# Patient Record
Sex: FEMALE | Race: OTHER | NOT HISPANIC OR LATINO | URBAN - METROPOLITAN AREA
[De-identification: names, ages, dates, MRNs, and addresses within clinical notes are randomized per-mention and may not be internally consistent; named-entity substitution may affect disease eponyms.]

---

## 2021-03-13 ENCOUNTER — INPATIENT (INPATIENT)
Facility: HOSPITAL | Age: 81
LOS: 4 days | Discharge: HOME HEALTH | End: 2021-03-18
Attending: HOSPITALIST | Admitting: HOSPITALIST
Payer: MEDICARE

## 2021-03-13 VITALS
DIASTOLIC BLOOD PRESSURE: 88 MMHG | TEMPERATURE: 97 F | OXYGEN SATURATION: 94 % | RESPIRATION RATE: 16 BRPM | SYSTOLIC BLOOD PRESSURE: 138 MMHG | HEART RATE: 101 BPM

## 2021-03-13 DIAGNOSIS — R55 SYNCOPE AND COLLAPSE: ICD-10-CM

## 2021-03-13 LAB
ALBUMIN SERPL ELPH-MCNC: 3.8 G/DL — SIGNIFICANT CHANGE UP (ref 3.3–5)
ALP SERPL-CCNC: 88 U/L — SIGNIFICANT CHANGE UP (ref 40–120)
ALT FLD-CCNC: 12 U/L — SIGNIFICANT CHANGE UP (ref 4–33)
ANION GAP SERPL CALC-SCNC: 13 MMOL/L — SIGNIFICANT CHANGE UP (ref 7–14)
APTT BLD: 28.4 SEC — SIGNIFICANT CHANGE UP (ref 27–36.3)
AST SERPL-CCNC: 35 U/L — HIGH (ref 4–32)
BASOPHILS # BLD AUTO: 0.02 K/UL — SIGNIFICANT CHANGE UP (ref 0–0.2)
BASOPHILS NFR BLD AUTO: 0.4 % — SIGNIFICANT CHANGE UP (ref 0–2)
BILIRUB SERPL-MCNC: 0.4 MG/DL — SIGNIFICANT CHANGE UP (ref 0.2–1.2)
BUN SERPL-MCNC: 18 MG/DL — SIGNIFICANT CHANGE UP (ref 7–23)
CALCIUM SERPL-MCNC: 10.6 MG/DL — HIGH (ref 8.4–10.5)
CHLORIDE SERPL-SCNC: 103 MMOL/L — SIGNIFICANT CHANGE UP (ref 98–107)
CO2 SERPL-SCNC: 23 MMOL/L — SIGNIFICANT CHANGE UP (ref 22–31)
CORTIS AM PEAK SERPL-MCNC: 9.4 UG/DL — SIGNIFICANT CHANGE UP (ref 6–18.4)
CREAT SERPL-MCNC: 1.27 MG/DL — SIGNIFICANT CHANGE UP (ref 0.5–1.3)
EOSINOPHIL # BLD AUTO: 0.06 K/UL — SIGNIFICANT CHANGE UP (ref 0–0.5)
EOSINOPHIL NFR BLD AUTO: 1.1 % — SIGNIFICANT CHANGE UP (ref 0–6)
GLUCOSE SERPL-MCNC: 104 MG/DL — HIGH (ref 70–99)
HCT VFR BLD CALC: 38.5 % — SIGNIFICANT CHANGE UP (ref 34.5–45)
HGB BLD-MCNC: 12 G/DL — SIGNIFICANT CHANGE UP (ref 11.5–15.5)
IANC: 3.84 K/UL — SIGNIFICANT CHANGE UP (ref 1.5–8.5)
IMM GRANULOCYTES NFR BLD AUTO: 0.4 % — SIGNIFICANT CHANGE UP (ref 0–1.5)
INR BLD: 1.07 RATIO — SIGNIFICANT CHANGE UP (ref 0.88–1.16)
LYMPHOCYTES # BLD AUTO: 1.18 K/UL — SIGNIFICANT CHANGE UP (ref 1–3.3)
LYMPHOCYTES # BLD AUTO: 21.5 % — SIGNIFICANT CHANGE UP (ref 13–44)
MAGNESIUM SERPL-MCNC: 1.8 MG/DL — SIGNIFICANT CHANGE UP (ref 1.6–2.6)
MCHC RBC-ENTMCNC: 28.8 PG — SIGNIFICANT CHANGE UP (ref 27–34)
MCHC RBC-ENTMCNC: 31.2 GM/DL — LOW (ref 32–36)
MCV RBC AUTO: 92.3 FL — SIGNIFICANT CHANGE UP (ref 80–100)
MONOCYTES # BLD AUTO: 0.37 K/UL — SIGNIFICANT CHANGE UP (ref 0–0.9)
MONOCYTES NFR BLD AUTO: 6.7 % — SIGNIFICANT CHANGE UP (ref 2–14)
NEUTROPHILS # BLD AUTO: 3.84 K/UL — SIGNIFICANT CHANGE UP (ref 1.8–7.4)
NEUTROPHILS NFR BLD AUTO: 69.9 % — SIGNIFICANT CHANGE UP (ref 43–77)
NRBC # BLD: 0 /100 WBCS — SIGNIFICANT CHANGE UP
NRBC # FLD: 0 K/UL — SIGNIFICANT CHANGE UP
PHOSPHATE SERPL-MCNC: 2.8 MG/DL — SIGNIFICANT CHANGE UP (ref 2.5–4.5)
PLATELET # BLD AUTO: 455 K/UL — HIGH (ref 150–400)
POTASSIUM SERPL-MCNC: 4.6 MMOL/L — SIGNIFICANT CHANGE UP (ref 3.5–5.3)
POTASSIUM SERPL-SCNC: 4.6 MMOL/L — SIGNIFICANT CHANGE UP (ref 3.5–5.3)
PROT SERPL-MCNC: 8 G/DL — SIGNIFICANT CHANGE UP (ref 6–8.3)
PROTHROM AB SERPL-ACNC: 12.3 SEC — SIGNIFICANT CHANGE UP (ref 10.6–13.6)
RBC # BLD: 4.17 M/UL — SIGNIFICANT CHANGE UP (ref 3.8–5.2)
RBC # FLD: 14 % — SIGNIFICANT CHANGE UP (ref 10.3–14.5)
SARS-COV-2 RNA SPEC QL NAA+PROBE: SIGNIFICANT CHANGE UP
SODIUM SERPL-SCNC: 139 MMOL/L — SIGNIFICANT CHANGE UP (ref 135–145)
T4 AB SER-ACNC: 8.97 UG/DL — SIGNIFICANT CHANGE UP (ref 5.1–13)
TROPONIN T, HIGH SENSITIVITY RESULT: 33 NG/L — SIGNIFICANT CHANGE UP
TROPONIN T, HIGH SENSITIVITY RESULT: 36 NG/L — SIGNIFICANT CHANGE UP
TSH SERPL-MCNC: <0.1 UIU/ML — LOW (ref 0.27–4.2)
WBC # BLD: 5.49 K/UL — SIGNIFICANT CHANGE UP (ref 3.8–10.5)
WBC # FLD AUTO: 5.49 K/UL — SIGNIFICANT CHANGE UP (ref 3.8–10.5)

## 2021-03-13 PROCEDURE — 99285 EMERGENCY DEPT VISIT HI MDM: CPT | Mod: CS,25

## 2021-03-13 PROCEDURE — 93010 ELECTROCARDIOGRAM REPORT: CPT

## 2021-03-13 PROCEDURE — 99223 1ST HOSP IP/OBS HIGH 75: CPT | Mod: GC

## 2021-03-13 PROCEDURE — 70450 CT HEAD/BRAIN W/O DYE: CPT | Mod: 26

## 2021-03-13 PROCEDURE — 71045 X-RAY EXAM CHEST 1 VIEW: CPT | Mod: 26

## 2021-03-13 RX ORDER — SODIUM CHLORIDE 9 MG/ML
500 INJECTION INTRAMUSCULAR; INTRAVENOUS; SUBCUTANEOUS ONCE
Refills: 0 | Status: COMPLETED | OUTPATIENT
Start: 2021-03-13 | End: 2021-03-13

## 2021-03-13 RX ORDER — AMLODIPINE BESYLATE 2.5 MG/1
2.5 TABLET ORAL ONCE
Refills: 0 | Status: COMPLETED | OUTPATIENT
Start: 2021-03-13 | End: 2021-03-13

## 2021-03-13 RX ADMIN — AMLODIPINE BESYLATE 2.5 MILLIGRAM(S): 2.5 TABLET ORAL at 20:02

## 2021-03-13 RX ADMIN — SODIUM CHLORIDE 666.67 MILLILITER(S): 9 INJECTION INTRAMUSCULAR; INTRAVENOUS; SUBCUTANEOUS at 18:31

## 2021-03-13 NOTE — H&P ADULT - ASSESSMENT
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

## 2021-03-13 NOTE — ED ADULT TRIAGE NOTE - CHIEF COMPLAINT QUOTE
pt brought from home after syncopal episode while sitting in walker, witnessed by son. RR even and unlabored. Pt mute and deaf, endorsing rt sided CP and back pain. No significant PMH.

## 2021-03-13 NOTE — ED ADULT NURSE NOTE - BP NONINVASIVE SYSTOLIC (MM HG)
[facility] Follow-up    Call initiated by patient.  Message recorded by Agustina Gramajo  Calling for: Monthly follow up and communication with language line  Patient: Hayde LUJAN Say  Phone conversation with: Patient  Patient's Phone number/s: Home number on file [phone] (home)  Available today in the PM on their Home number.  OK to leave message on voice mail? Yes      Major diagnoses and/or issues requiring coordination services  BRENDA (generalized anxiety disorder)   PTSD (post-traumatic stress disorder)   Seasonal allergic rhinitis   Recurrent major depressive disorder (H)     BRENDA (generalized anxiety disorder)   PTSD (post-traumatic stress disorder)   Seasonal allergic rhinitis   Recurrent major depressive disorder (H)           In the past month, how many times have you been to the Emergency Room? 0  In the past month, how many times have you been hospitalized? 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     Self-management goals:  Work with therapist to address dreams, anxieties and fears  Communicate to Agustina on needs for her and children around appointments   Work with shawn to establish more in home help and possibly more mental didier help  Readiness to change: Ready  Meeting with Shawn in her home at 2pm Monday 5.7  School team out today Friday 5.4  Will see me this week for med set    Counseling and coordination activities with: patient/family, PCP and specialist:     Follow-up date: monthly and prn   204

## 2021-03-13 NOTE — ED PROVIDER NOTE - OBJECTIVE STATEMENT
Dhruv: Story from son: 1 month ago, pt passed out in the kitchen; didn't hit head; didn't seek care. Today, felt dizzy, began to faint while sitting in her walker, slow breathing; occurred 3:15 PM. Fainted for a few seconds. Didn't fall. No CP/SOB/HA. Has chronic back pain; not worse than usual. Hasn't been sick recently. 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 Not know PCP. Has been eating/drinking well recently. Dhruv: Story from son: 1 month ago, pt passed out in the kitchen; didn't hit head; didn't seek care. Today, felt dizzy, began to faint while sitting in her walker, slow breathing; occurred 3:15 PM. Fainted for a few seconds. Didn't fall. No CP/SOB/HA. Has chronic back pain; not worse than usual. Hasn't been sick recently. 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 Not know PCP. Has been eating/drinking well recently.    Khris Mendez

## 2021-03-13 NOTE — ED PROVIDER NOTE - PHYSICAL EXAMINATION
Well appearing, well nourished, awake, alert, deaf and mute, in no apparent distress.    Airway patent    Eyes without scleral injection. No jaundice.    Strong pulse.    Respirations unlabored.    Abdomen soft, non-tender, no guarding.    Spine appears normal, range of motion is not limited, no muscle or joint tenderness.    Alert and oriented, no gross motor or sensory deficits.    Skin normal color for race, warm, dry and intact. No evidence of rash.    No SI/HI.

## 2021-03-13 NOTE — H&P ADULT - PROBLEM SELECTOR PLAN 3
- pt with history of HTN, unknown home medications  - s/p amlodipine in ED, noted to be HTNsive to SBP 190s; given 5 mg hydralazine - pt with history of HTN, unknown home medications  - s/p amlodipine in ED, noted to be HTNsive to SBP 190s; given 5 mg hydralazine, c/w amlodipine

## 2021-03-13 NOTE — ED ADULT NURSE NOTE - OBJECTIVE STATEMENT
Pt presents to room 2, alert&orientedx4, ambulatory with walker, PMHX HTN, non-verbal and deaf at baseline since birth) coming to ED for witnessed syncopal episode by son that lasted ~few seconds. Pt was sitting on her walker when son saw that she experienced LOC, followed by slow breathing. Denies any falls, injuries or head trauma. As per son, pt experienced similar episode [id number], had syncopal episode in the kitchen and did not seek medical attention. Upon arrival, pt appears NAD, breathing even and non-labored, NSR on cardiac monitor, skin dry and intact, mole to outside of right eye, and is non-diaphoretic. Pt denies any chest pain/SOB/fever or n/v/d. Pt arrived with de-stress belt. Awaiting further plan of care

## 2021-03-13 NOTE — ED PROVIDER NOTE - PROGRESS NOTE DETAILS
Sue Torres MD, PGY3: Patient received at resident sign out. pituitary tumor, NSGY called, endocrine work up and syncope work up first under medicine, Dr. Oconnor acceted the pt.

## 2021-03-13 NOTE — H&P ADULT - NSHPSOCIALHISTORY_GEN_ALL_CORE
Unknown. Per son, patient lives with son, his girlfriend, and his stepdaughter. Uses cane and walker to ambulate. No history of smoking or drinking alcohol

## 2021-03-13 NOTE — H&P ADULT - HISTORY OF PRESENT ILLNESS
Dhruv: Story from son: 1 month ago, pt passed out in the kitchen; didn't hit head; didn't seek care. Today, felt dizzy, began to faint while sitting in her walker, slow breathing; occurred 3:15 PM. Fainted for a few seconds. Didn't fall. No CP/SOB/HA. Has chronic back pain; not worse than usual. Hasn't been sick recently. 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 Not know PCP. Has been eating/drinking well recently.    	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 Brief history obtained from son in ED, however unable to reach family for comprehensive history after multiple attempts.  Dhruv: Story from son: 1 month ago, pt passed out in the kitchen; didn't hit head; didn't seek care. Today, felt dizzy, began to faint while sitting in her walker, slow breathing; occurred 3:15 PM. Fainted for a few seconds. Didn't fall. No CP/SOB/HA. Has chronic back pain; not worse than usual. Hasn't been sick recently. 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 Not know PCP. Has been eating/drinking well recently.    In ED, T 96.6  /88 (max 190/79) RR 16 SpO2 94% RA. Given 2.5 mg amlodipine, 500 cc bolus. 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 No surgical intervention at this point per neurosurgery. Pituitary Labs (ACTH, TSH, LH/FSH, Prolactin, GH) ordered.   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 Spoke with son Domingo. Per son, patient was sitting on walker around 3:15 in the kitchen. She was making noise to express that she was dizzy. Her right arm became limp followed by her left arm. Per son, patient lost consciousness in her chair for 1 minute, after which she regained consciousness on her own. One month ago, patient was found on the ground in the kitchen after an apparent fall (unclear whether patient fainted first and then fell, or lost consciousness after slipping). At that time, patient also spontaneously regain consciousness for few seconds. Patient did not recall falling. Patient lives with son and his stepdaughter. Patient takes no medications. Son does not know name of PCP. Per son, he did not seek medical attention for first syncopal episode because patient refused to go to the doctor. She  has had ankle and abdominal surgery in the past requiring metal to be placed in body, but son in unsure of specifics. She has not seen a doctor in 2 years.     In ED, T 96.6  /88 (max 190/79) RR 16 SpO2 94% RA. Given 2.5 mg amlodipine, 500 cc bolus. 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 No surgical intervention at this point per neurosurgery. Pituitary Labs (ACTH, TSH, LH/FSH, Prolactin, GH) ordered.   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 Spoke with son Domingo. Per son, patient was sitting on walker around 3:15 in the kitchen. She was making noise to express that she was dizzy. Her right arm became limp followed by her left arm. Per son, patient lost consciousness in her chair for 1 minute, after which she regained consciousness on her own. One month ago, patient was found on the ground in the kitchen after an apparent fall (unclear whether patient fainted first and then fell, or lost consciousness after slipping). At that time, patient also spontaneously regain consciousness for few seconds. Patient did not recall falling. Patient lives with son and his stepdaughter. Patient takes no medications. Son does not know name of PCP. Per son, he did not seek medical attention for first syncopal episode because patient refused to go to the doctor. She  has had ankle and abdominal surgery in the past requiring metal to be placed in body, but son in unsure of specifics. She has not seen a doctor in 2 years.     In ED, T 96.6  /88 (max 190/79) RR 16 SpO2 94% RA. Given 2.5 mg amlodipine, 500 cc bolus. 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 No surgical intervention at this point per neurosurgery. Pituitary Labs (ACTH, TSH, LH/FSH, Prolactin, GH) ordered.

## 2021-03-13 NOTE — H&P ADULT - PROBLEM SELECTOR PLAN 2
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 Per neurosurgery, no surgical intervention at this time.  - MR brain ordered  - f/u endocrine labs: ACTH, TSH, LH/FSH, Prolactin, GH

## 2021-03-13 NOTE — H&P ADULT - PROBLEM SELECTOR PLAN 1
-pt presenting with history of 2 syncopal episodes in setting of brain mass. DDx include neurologic syncope vs. cardiogenic syncope vs syncope from orthostasis. However, patient does not have a known hx of arrhythmia. Uknown whether patietn had presyncopal symptoms. Unlikely to be syncope from orthostastis given hypertension  - admit to telemetry  - TTE  - MR brain ordered -pt presenting with history of 2 syncopal episodes in setting of brain mass. DDx include neurologic syncope vs. cardiogenic syncope vs syncope from orthostasis. However, patient does not have a known hx of arrhythmia. Uknown whether patietn had presyncopal symptoms. Unlikely to be syncope from orthostastis given hypertension  - admit to telemetry, EKG  - TTE  - MR brain ordered  - neuro consult in AM, ?seizure

## 2021-03-13 NOTE — ED PROVIDER NOTE - ATTENDING CONTRIBUTION TO CARE
I performed a face-to-face evaluation of the patient and performed a history and physical examination. I agree with the history and physical examination.    Syncope today. No CP/SOB/HA. Appears well. NAD. Afebrile. Marked HTN. Check EKG, trop, BG, CT brain, CXR (if wide mediastinum, check CT aorta). Admit.

## 2021-03-13 NOTE — ED PROVIDER NOTE - CLINICAL SUMMARY MEDICAL DECISION MAKING FREE TEXT BOX
Dhruv: Syncope today. No CP/SOB/HA. Appears well. NAD. Afebrile. Marked HTN. Check EKG, trop, BG, CT brain, CXR (if wide mediastinum, check CT aorta). Admit.

## 2021-03-13 NOTE — H&P ADULT - NSHPPHYSICALEXAM_GEN_ALL_CORE
GENERAL: NAD, well-developed  HEAD:  Atraumatic, Normocephalic  EYES: EOMI, PERRLA, conjunctiva and sclera clear  NECK: Supple, No JVD  CHEST/LUNG: Clear to auscultation bilaterally; No wheezes or rales  HEART: Regular rate and rhythm; No murmurs, rubs, or gallops  ABDOMEN: Soft, Nontender, Nondistended; Bowel sounds present  EXTREMITIES:  2+ Peripheral Pulses, No clubbing, cyanosis, or edema  PSYCH: AAOx3  NEUROLOGY: non-focal  SKIN: No rashes or lesions GENERAL: NAD, well-developed  HEAD:  Atraumatic, Normocephalic  EYES: EOMI, PERRLA, conjunctiva and sclera clear. Ptosis of right eye  NECK: Supple, No JVD  CHEST/LUNG: Clear to auscultation bilaterally; No wheezes or rales  HEART: Regular rate and rhythm; No murmurs, rubs, or gallops  ABDOMEN: Soft, Nontender, Nondistended; Bowel sounds present  EXTREMITIES:  2+ Peripheral Pulses, No clubbing, cyanosis, or edema  PSYCH: AAOx0  NEUROLOGY: unable to fully assess due to mental status.   SKIN: No rashes or lesions. T(C): 36.4 (03-14-21 @ 04:54), Max: 37 (03-13-21 @ 17:55)  HR: 76 (03-14-21 @ 04:54) (65 - 101)  BP: 150/84 (03-14-21 @ 04:54) (135/101 - 204/62)  RR: 17 (03-14-21 @ 04:54) (13 - 18)  SpO2: 98% (03-14-21 @ 04:54) (94% - 99%)    Constitutional: NAD, well-developed, well-nourished  Ears, Nose, Mouth, and Throat: normal external ears and nose, +deaf, moist oral mucosa  Eyes: normal conjunctiva, EOMI, PERRL, Ptosis of right eye  Neck: supple, no JVD  Respiratory: Clear to auscultation bilaterally. No wheezes, rales or rhonchi. Normal respiratory effort  Cardiovascular: RRR, no M/R/G, no edema, 2+ Peripheral Pulses  Gastrointestinal: soft, nontender, nondistended, +BS, no hernia  Skin: warm, dry, no rash  Neurologic: difficult to assess due to communication barrier, sensation grossly intact, moving all extremities with no difficulty  Musculoskeletal: no clubbing, no cyanosis, no joint swelling  Psychiatric: alert, unable to obtain orientation, no agitation, anxiety

## 2021-03-14 DIAGNOSIS — Z29.9 ENCOUNTER FOR PROPHYLACTIC MEASURES, UNSPECIFIED: ICD-10-CM

## 2021-03-14 DIAGNOSIS — R55 SYNCOPE AND COLLAPSE: ICD-10-CM

## 2021-03-14 DIAGNOSIS — Z02.9 ENCOUNTER FOR ADMINISTRATIVE EXAMINATIONS, UNSPECIFIED: ICD-10-CM

## 2021-03-14 DIAGNOSIS — T68.XXXA HYPOTHERMIA, INITIAL ENCOUNTER: ICD-10-CM

## 2021-03-14 DIAGNOSIS — G93.89 OTHER SPECIFIED DISORDERS OF BRAIN: ICD-10-CM

## 2021-03-14 DIAGNOSIS — I10 ESSENTIAL (PRIMARY) HYPERTENSION: ICD-10-CM

## 2021-03-14 LAB
ALBUMIN SERPL ELPH-MCNC: 3.5 G/DL — SIGNIFICANT CHANGE UP (ref 3.3–5)
ALP SERPL-CCNC: 83 U/L — SIGNIFICANT CHANGE UP (ref 40–120)
ALT FLD-CCNC: 7 U/L — SIGNIFICANT CHANGE UP (ref 4–33)
ANION GAP SERPL CALC-SCNC: 13 MMOL/L — SIGNIFICANT CHANGE UP (ref 7–14)
AST SERPL-CCNC: 24 U/L — SIGNIFICANT CHANGE UP (ref 4–32)
BILIRUB SERPL-MCNC: 0.4 MG/DL — SIGNIFICANT CHANGE UP (ref 0.2–1.2)
BUN SERPL-MCNC: 19 MG/DL — SIGNIFICANT CHANGE UP (ref 7–23)
CALCIUM SERPL-MCNC: 10.4 MG/DL — SIGNIFICANT CHANGE UP (ref 8.4–10.5)
CHLORIDE SERPL-SCNC: 99 MMOL/L — SIGNIFICANT CHANGE UP (ref 98–107)
CO2 SERPL-SCNC: 25 MMOL/L — SIGNIFICANT CHANGE UP (ref 22–31)
CREAT SERPL-MCNC: 1.28 MG/DL — SIGNIFICANT CHANGE UP (ref 0.5–1.3)
FSH SERPL-MCNC: 1.5 IU/L — SIGNIFICANT CHANGE UP
GLUCOSE SERPL-MCNC: 149 MG/DL — HIGH (ref 70–99)
HCT VFR BLD CALC: 36.4 % — SIGNIFICANT CHANGE UP (ref 34.5–45)
HGB BLD-MCNC: 11.3 G/DL — LOW (ref 11.5–15.5)
LH SERPL-ACNC: <0.3 IU/L — SIGNIFICANT CHANGE UP
MAGNESIUM SERPL-MCNC: 1.8 MG/DL — SIGNIFICANT CHANGE UP (ref 1.6–2.6)
MCHC RBC-ENTMCNC: 29 PG — SIGNIFICANT CHANGE UP (ref 27–34)
MCHC RBC-ENTMCNC: 31 GM/DL — LOW (ref 32–36)
MCV RBC AUTO: 93.6 FL — SIGNIFICANT CHANGE UP (ref 80–100)
NRBC # BLD: 0 /100 WBCS — SIGNIFICANT CHANGE UP
NRBC # FLD: 0 K/UL — SIGNIFICANT CHANGE UP
PHOSPHATE SERPL-MCNC: 2.3 MG/DL — LOW (ref 2.5–4.5)
PLATELET # BLD AUTO: 448 K/UL — HIGH (ref 150–400)
POTASSIUM SERPL-MCNC: 3.6 MMOL/L — SIGNIFICANT CHANGE UP (ref 3.5–5.3)
POTASSIUM SERPL-SCNC: 3.6 MMOL/L — SIGNIFICANT CHANGE UP (ref 3.5–5.3)
PROLACTIN SERPL-MCNC: 113 NG/ML — HIGH (ref 3.4–24.1)
PROT SERPL-MCNC: 7.3 G/DL — SIGNIFICANT CHANGE UP (ref 6–8.3)
RBC # BLD: 3.89 M/UL — SIGNIFICANT CHANGE UP (ref 3.8–5.2)
RBC # FLD: 14.1 % — SIGNIFICANT CHANGE UP (ref 10.3–14.5)
SARS-COV-2 IGG SERPL QL IA: NEGATIVE — SIGNIFICANT CHANGE UP
SARS-COV-2 IGM SERPL IA-ACNC: 0.07 INDEX — SIGNIFICANT CHANGE UP
SODIUM SERPL-SCNC: 137 MMOL/L — SIGNIFICANT CHANGE UP (ref 135–145)
T3 SERPL-MCNC: 108 NG/DL — SIGNIFICANT CHANGE UP (ref 80–200)
T3 SERPL-MCNC: 99 NG/DL — SIGNIFICANT CHANGE UP (ref 80–200)
T4 FREE SERPL-MCNC: 1.2 NG/DL — SIGNIFICANT CHANGE UP (ref 0.9–1.8)
T4 FREE SERPL-MCNC: 1.3 NG/DL — SIGNIFICANT CHANGE UP (ref 0.9–1.8)
WBC # BLD: 4.17 K/UL — SIGNIFICANT CHANGE UP (ref 3.8–10.5)
WBC # FLD AUTO: 4.17 K/UL — SIGNIFICANT CHANGE UP (ref 3.8–10.5)

## 2021-03-14 PROCEDURE — 99233 SBSQ HOSP IP/OBS HIGH 50: CPT | Mod: GC

## 2021-03-14 PROCEDURE — 99222 1ST HOSP IP/OBS MODERATE 55: CPT | Mod: GC

## 2021-03-14 RX ORDER — LIDOCAINE 4 G/100G
1 CREAM TOPICAL DAILY
Refills: 0 | Status: DISCONTINUED | OUTPATIENT
Start: 2021-03-14 | End: 2021-03-18

## 2021-03-14 RX ORDER — HYDRALAZINE HCL 50 MG
5 TABLET ORAL ONCE
Refills: 0 | Status: COMPLETED | OUTPATIENT
Start: 2021-03-14 | End: 2021-03-14

## 2021-03-14 RX ORDER — ACETAMINOPHEN 500 MG
650 TABLET ORAL EVERY 6 HOURS
Refills: 0 | Status: DISCONTINUED | OUTPATIENT
Start: 2021-03-14 | End: 2021-03-18

## 2021-03-14 RX ORDER — AMLODIPINE BESYLATE 2.5 MG/1
5 TABLET ORAL DAILY
Refills: 0 | Status: DISCONTINUED | OUTPATIENT
Start: 2021-03-14 | End: 2021-03-15

## 2021-03-14 RX ORDER — ENOXAPARIN SODIUM 100 MG/ML
40 INJECTION SUBCUTANEOUS DAILY
Refills: 0 | Status: DISCONTINUED | OUTPATIENT
Start: 2021-03-14 | End: 2021-03-18

## 2021-03-14 RX ORDER — LABETALOL HCL 100 MG
10 TABLET ORAL ONCE
Refills: 0 | Status: COMPLETED | OUTPATIENT
Start: 2021-03-14 | End: 2021-03-14

## 2021-03-14 RX ORDER — ACETAMINOPHEN 500 MG
1000 TABLET ORAL ONCE
Refills: 0 | Status: COMPLETED | OUTPATIENT
Start: 2021-03-14 | End: 2021-03-14

## 2021-03-14 RX ORDER — POLYETHYLENE GLYCOL 3350 17 G/17G
17 POWDER, FOR SOLUTION ORAL
Refills: 0 | Status: DISCONTINUED | OUTPATIENT
Start: 2021-03-14 | End: 2021-03-18

## 2021-03-14 RX ADMIN — Medication 5 MILLIGRAM(S): at 01:08

## 2021-03-14 RX ADMIN — POLYETHYLENE GLYCOL 3350 17 GRAM(S): 17 POWDER, FOR SOLUTION ORAL at 17:09

## 2021-03-14 RX ADMIN — ENOXAPARIN SODIUM 40 MILLIGRAM(S): 100 INJECTION SUBCUTANEOUS at 13:02

## 2021-03-14 RX ADMIN — Medication 650 MILLIGRAM(S): at 10:23

## 2021-03-14 RX ADMIN — Medication 10 MILLIGRAM(S): at 22:21

## 2021-03-14 RX ADMIN — Medication 1000 MILLIGRAM(S): at 04:07

## 2021-03-14 RX ADMIN — AMLODIPINE BESYLATE 5 MILLIGRAM(S): 2.5 TABLET ORAL at 13:02

## 2021-03-14 RX ADMIN — LIDOCAINE 1 PATCH: 4 CREAM TOPICAL at 19:00

## 2021-03-14 RX ADMIN — Medication 650 MILLIGRAM(S): at 11:00

## 2021-03-14 RX ADMIN — Medication 400 MILLIGRAM(S): at 03:07

## 2021-03-14 RX ADMIN — LIDOCAINE 1 PATCH: 4 CREAM TOPICAL at 13:02

## 2021-03-14 NOTE — PROGRESS NOTE ADULT - SUBJECTIVE AND OBJECTIVE BOX
PROGRESS NOTE:     CONTACT INFO:  Shaista Feliciano MD   PGY-1  Pager: [phone] [facility]    Patient is a 81y old  Female who presents with a chief complaint of syncope (13 Mar 2021 23:59)      SUBJECTIVE / OVERNIGHT EVENTS:  No acute events overnight. Patient seen and evaluated at bedside. No fever. Pt able to indicate that she has back pain. Unable to assess other systems as pt is mute and deaf and cannot communicate with writing or sign language.    ADDITIONAL REVIEW OF SYSTEMS:    As above.     MEDICATIONS  (STANDING):  amLODIPine   Tablet 5 milliGRAM(s) Oral daily  enoxaparin Injectable 40 milliGRAM(s) SubCutaneous daily    MEDICATIONS  (PRN):  acetaminophen   Tablet .. 650 milliGRAM(s) Oral every 6 hours PRN Mild Pain (1 - 3)      CAPILLARY BLOOD GLUCOSE        I&O's Summary    13 Mar 2021 06:01  -  14 Mar 2021 07:00  --------------------------------------------------------  IN: 100 mL / OUT: 0 mL / NET: 100 mL        PHYSICAL EXAM:  Vital Signs Last 24 Hrs  T(C): 36.4 (14 Mar 2021 04:54), Max: 37 (13 Mar 2021 17:55)  T(F): 97.5 (14 Mar 2021 04:54), Max: 98.6 (13 Mar 2021 17:55)  HR: 76 (14 Mar 2021 04:54) (65 - 101)  BP: 150/84 (14 Mar 2021 04:54) (135/101 - 204/62)  BP(mean): --  RR: 17 (14 Mar 2021 04:54) (13 - 18)  SpO2: 98% (14 Mar 2021 04:54) (94% - 99%)    CONSTITUTIONAL: NAD, well-developed  RESPIRATORY: Normal respiratory effort; lungs are clear to auscultation bilaterally  CARDIOVASCULAR: Regular rate and rhythm, normal S1 and S2, no murmur/rub/gallop; No lower extremity edema; Peripheral pulses are 2+ bilaterally  ABDOMEN: Nontender to palpation, normoactive bowel sounds, no rebound/guarding; No hepatosplenomegaly  MUSCLOSKELETAL: no clubbing or cyanosis of digits; no joint swelling or tenderness to palpation  PSYCH: A+O to person, place, and time; affect appropriate    LABS:                        12.0   5.49  )-----------( 455      ( 13 Mar 2021 18:31 )             38.5     03-13    139  |  103  |  18  ----------------------------<  104<H>  4.6   |  23  |  1.27    Ca    10.6<H>      13 Mar 2021 18:31  Phos  2.8     03-13  Mg     1.8     03-13    TPro  8.0  /  Alb  3.8  /  TBili  0.4  /  DBili  x   /  AST  35<H>  /  ALT  12  /  AlkPhos  88  03-13    PT/INR - ( 13 Mar 2021 18:31 )   PT: 12.3 sec;   INR: 1.07 ratio         PTT - ( 13 Mar 2021 18:31 )  PTT:28.4 sec      Thyroid Stimulating Hormone, Serum: <0.10 uIU/mL (03.13.21 @ 21:51)     Cortisol AM, Serum (03.13.21 @ 21:51)   Cortisol AM, Serum: 9.4 ug/dL     Troponin T, High Sensitivity Result: 33      RADIOLOGY & ADDITIONAL TESTS:    CT Head:    FINDINGS:  VENTRICLES AND SULCI:  Within normal limits  INTRA-AXIAL:  No acute hemorrhage.  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 This mass measures 3.2 x 1.2 cm and causes effacement of the ventral lower judit. This mass does appear to involve the sella region cannot be  from the pituitary gland. 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 Hypodensity in the medial left parietal cortex (series 2 image 17) as well as the left parietal subcortical region, this could be compatible with an area of ischemia though the possibly of gliosis secondary to an old infarct cannot because excluded.  EXTRA-AXIAL:  No mass or collection is seen.  VISUALIZED SINUSES:  Mucous retentioncyst versus polyp within the right sphenoid sinus.  VISUALIZED MASTOIDS:  Mild mucosal thickening.  CALVARIUM:  No fracture.  Parenchymal volume loss and chronic microvascular ischemic changes are identified.    IMPRESSION:    Large extra-axial mass as described above. Contrast enhanced MRI of the brain and sella turcica is recommended for further evaluation.     PROGRESS NOTE:     CONTACT INFO:  Shaista Feliciano MD   PGY-1  Pager: [phone] [facility]    Patient is a 81y old  Female who presents with a chief complaint of syncope (13 Mar 2021 23:59)      SUBJECTIVE / OVERNIGHT EVENTS:  No acute events overnight. Patient seen and evaluated at bedside. No fever. Pt able to indicate that she has back pain. Unable to assess other systems as pt is mute and deaf and cannot communicate with writing or sign language.    ADDITIONAL REVIEW OF SYSTEMS:    As above.     MEDICATIONS  (STANDING):  amLODIPine   Tablet 5 milliGRAM(s) Oral daily  enoxaparin Injectable 40 milliGRAM(s) SubCutaneous daily    MEDICATIONS  (PRN):  acetaminophen   Tablet .. 650 milliGRAM(s) Oral every 6 hours PRN Mild Pain (1 - 3)      CAPILLARY BLOOD GLUCOSE        I&O's Summary    13 Mar 2021 06:01  -  14 Mar 2021 07:00  --------------------------------------------------------  IN: 100 mL / OUT: 0 mL / NET: 100 mL        PHYSICAL EXAM:  Vital Signs Last 24 Hrs  T(C): 36.4 (14 Mar 2021 04:54), Max: 37 (13 Mar 2021 17:55)  T(F): 97.5 (14 Mar 2021 04:54), Max: 98.6 (13 Mar 2021 17:55)  HR: 76 (14 Mar 2021 04:54) (65 - 101)  BP: 150/84 (14 Mar 2021 04:54) (135/101 - 204/62)  BP(mean): --  RR: 17 (14 Mar 2021 04:54) (13 - 18)  SpO2: 98% (14 Mar 2021 04:54) (94% - 99%)    Constitutional: NAD, well-developed, well-nourished  Ears, Nose, Mouth, and Throat: normal external ears and nose, +deaf, moist oral mucosa  Eyes: normal conjunctiva, EOMI, PERRL, Ptosis of right eye  Neck: supple, no JVD  Respiratory: Clear to auscultation bilaterally. No wheezes, rales or rhonchi. Normal respiratory effort  Cardiovascular: RRR, no M/R/G, no edema, 2+ Peripheral Pulses  Gastrointestinal: soft, nontender, nondistended, +BS, no hernia  Skin: warm, dry, no rash  Neurologic: difficult to assess due to communication barrier, sensation grossly intact, moving all extremities with no difficulty  Musculoskeletal: no clubbing, no cyanosis, no joint swelling  Psychiatric: alert, unable to obtain orientation, no agitation, anxiety    LABS:                        12.0   5.49  )-----------( 455      ( 13 Mar 2021 18:31 )             38.5     03-13    139  |  103  |  18  ----------------------------<  104<H>  4.6   |  23  |  1.27    Ca    10.6<H>      13 Mar 2021 18:31  Phos  2.8     03-13  Mg     1.8     03-13    TPro  8.0  /  Alb  3.8  /  TBili  0.4  /  DBili  x   /  AST  35<H>  /  ALT  12  /  AlkPhos  88  03-13    PT/INR - ( 13 Mar 2021 18:31 )   PT: 12.3 sec;   INR: 1.07 ratio         PTT - ( 13 Mar 2021 18:31 )  PTT:28.4 sec      Thyroid Stimulating Hormone, Serum: <0.10 uIU/mL (03.13.21 @ 21:51)     Cortisol AM, Serum (03.13.21 @ 21:51)   Cortisol AM, Serum: 9.4 ug/dL     Troponin T, High Sensitivity Result: 33      RADIOLOGY & ADDITIONAL TESTS:    CT Head:    FINDINGS:  VENTRICLES AND SULCI:  Within normal limits  INTRA-AXIAL:  No acute hemorrhage.  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 This mass measures 3.2 x 1.2 cm and causes effacement of the ventral lower judit. This mass does appear to involve the sella region cannot be  from the pituitary gland. 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 Hypodensity in the medial left parietal cortex (series 2 image 17) as well as the left parietal subcortical region, this could be compatible with an area of ischemia though the possibly of gliosis secondary to an old infarct cannot because excluded.  EXTRA-AXIAL:  No mass or collection is seen.  VISUALIZED SINUSES:  Mucous retentioncyst versus polyp within the right sphenoid sinus.  VISUALIZED MASTOIDS:  Mild mucosal thickening.  CALVARIUM:  No fracture.  Parenchymal volume loss and chronic microvascular ischemic changes are identified.    IMPRESSION:    Large extra-axial mass as described above. Contrast enhanced MRI of the brain and sella turcica is recommended for further evaluation.

## 2021-03-14 NOTE — PROGRESS NOTE ADULT - ATTENDING COMMENTS
82 y/o deaf and mute (since birth) F with no significant PMH who presents for syncope from home.     #Syncope/fall, CTH showing a brain mass. Neurosurgery consulted and no surgical intervention for now. Recommending an MRI but patient has metal from prior ankle surgery. F/u recommendations regarding imaging. Work up sent for possible pituitary mass (TSH low but T3/T4 wnl), prolactin elevated, cortisol wnl. Pending ACTH. Neuro consulted. ? EEG. F/u recommendations. Tele monitoring. EKG pending. TTE ordered. F/u results     #HTN: SBP elevated to 190s. Received hydral 5mg and norvasc 5mg in the ED. BP now 150s. Will monitor on norvasc 5mg and uptitrate as needed.     #Back pain, has a hx of chronic back pain. At bedside with PT, pt showing us she has right sided back pain and along the sacrum. PT showing us that on their physical exam they can see areas of swelling of the right paraspinal muscle that indicates inflammation. Plan for Tylenol prn pain and a lidocaine patch to the affected areas.     Plan discussed with resident team.

## 2021-03-14 NOTE — PATIENT PROFILE ADULT - LANGUAGE ASSISTANCE NEEDED
RN able to understand and communicate via sign language/No-Patient/Caregiver offered and refused free interpretation services.

## 2021-03-14 NOTE — PROGRESS NOTE ADULT - SUBJECTIVE AND OBJECTIVE BOX
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 History obtained by chart review and primary team, Patient was sitting on walker around 3:15 in the kitchen. She was making noise to express that she was dizzy, Patient was then noted to have a her right arm go limp followed by her left arm. Per son's record, pt lost consciousness in her chair for 1 minute, after which she regained consciousness on her own. Per son's record, patient was noted to have a similar episode last month when she fell in the kitchen There was no urinary/fecal incontinence nor tongue biting noted.      PAST MEDICAL & SURGICAL HISTORY:  Mute    Deaf    HTN (hypertension)    No significant past surgical history      FAMILY HISTORY:  No pertinent family history in first degree relatives      Social Hx:  Nonsmoker, no drug or alcohol use    Home Medications:    MEDICATIONS  (STANDING):  amLODIPine   Tablet 5 milliGRAM(s) Oral daily  enoxaparin Injectable 40 milliGRAM(s) SubCutaneous daily  lidocaine   Patch 1 Patch Transdermal daily  polyethylene glycol 3350 17 Gram(s) Oral two times a day    MEDICATIONS  (PRN):  acetaminophen   Tablet .. 650 milliGRAM(s) Oral every 6 hours PRN Mild Pain (1 - 3)    Allergies  No Known Allergies    Intolerances      REVIEW OF SYSTEMS- unable to assess.     Vital Signs Last 24 Hrs  T(C): 36.6 (14 Mar 2021 12:59), Max: 37 (13 Mar 2021 17:55)  T(F): 97.9 (14 Mar 2021 12:59), Max: 98.6 (13 Mar 2021 17:55)  HR: 72 (14 Mar 2021 12:59) (65 - 101)  BP: 148/77 (14 Mar 2021 12:59) (135/101 - 204/62)  BP(mean): --  RR: 17 (14 Mar 2021 12:59) (13 - 18)  SpO2: 100% (14 Mar 2021 12:59) (94% - 100%)    GENERAL EXAM:  Constitutional: awake and alert. NAD  HEENT: PERRL, EOMI      NEUROLOGICAL EXAM:  MS: Awake and Alert and follows commands by showing patient what to do.   CN: VFF, EOMI, PERRL,    V1-3 intact, no facial asymmetry, t/p midline, SCM/trap intact.  Motor: Strength: 5/5 4x.   Tone: normal. Bulk: normal.   DTR 2+ symm.  in biceps and patellar b/l.   Sensation: intact to LT and Temperature 4x.   Coordination: finger to nose intact b/l.     NIHSS  mRS    Labs:   cbc                      11.3   4.17  )-----------( 448      ( 14 Mar 2021 12:28 )             36.4     [id number]    137  |  99  |  19  ----------------------------<  149<H>  3.6   |  25  |  1.28    Ca    10.4      14 Mar 2021 12:28  Phos  2.3     03-14  Mg     1.8     03-14    TPro  7.3  /  Alb  3.5  /  TBili  0.4  /  DBili  x   /  AST  24  /  ALT  7   /  AlkPhos  83  03-14    CoagsPT/INR - ( 13 Mar 2021 18:31 )   PT: 12.3 sec;   INR: 1.07 ratio         PTT - ( 13 Mar 2021 18:31 )  PTT:28.4 sec  Lipids  A1C  CardiacMarkers    LFTsLIVER FUNCTIONS - ( 14 Mar 2021 12:28 )  Alb: 3.5 g/dL / Pro: 7.3 g/dL / ALK PHOS: 83 U/L / ALT: 7 U/L / AST: 24 U/L / GGT: x           UA  CSF  Immunological Labs    Radiology:  CT head w/o contrast:   extra-axial: There is a large extra-axial high attenuated mass that appears to arise from the sella with extension into the prepontine cistern (left greater than right). This mass measures 3.2 x 1.2 cm and causes effacement of the ventral lower judit. This mass does appear to involve the sella region cannot be  from the pituitary gland. 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 Hypodensity in the medial left parietal cortex (series 2 image 17) as well as the left parietal subcortical region, this could be compatible with an area of ischemia though the possibly of gliosis secondary to an old infarct cannot because excluded.

## 2021-03-14 NOTE — PROGRESS NOTE ADULT - ASSESSMENT
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 Patient was sitting on walker around 3:15 in the kitchen. She was making noise to express that she was dizzy, Patient was then noted to have a her right arm go limp followed by her left arm. Per son's record, pt lost consciousness in her chair for 1 minute, after which she regained consciousness on her own. There was no urinary/fecal incontinence nor tongue biting noted. CT head w/o contrast was noted to extra-axial mass noted. Will need ot further imaging with Brain MRI w/wo contrast and neurosurgery recommendations with metastatic workup.     Impression/ plan:   Acute onset of syncope 2/2 to possible cardiogenic vs other etiology in the setting of Extra-axial mass either primary vs secondary.   Recommendations:   Brain MRI w/wo contrast   Brain MRA of head and neck to assess vessels   CT chest/ abd/ pelvis  Metastic workup   Neurosurgery recommendations   Telemetry   PT/OT   DVT ppx       Plan and case has been discussed with Neurology attending, Dr. Mitchell.

## 2021-03-14 NOTE — PROGRESS NOTE ADULT - PROBLEM SELECTOR PLAN 2
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   - Per neurosurgery, no surgical intervention at this time.  - MR brain ordered  - f/u endocrine labs: ACTH, TSH, LH/FSH, Prolactin, GH - CT head non contrast revealing 3.7 x 2 cm left extra-axial mass causing effacement of ventral lower judit and hypodensity in medial left parietal cortex; neurosurgery consulted.   - Per neurosurgery, no surgical intervention at this time.  - Will confirm with NSx which imaging to ordered as cannot get MRI  - f/u endocrine labs: ACTH, TSH, LH/FSH, Prolactin, GH

## 2021-03-14 NOTE — PROGRESS NOTE ADULT - PROBLEM SELECTOR PLAN 3
- pt with history of HTN, unknown home medications  - s/p amlodipine in ED, noted to be HTNsive to SBP 190s; given 5 mg hydralazine, - c/w amlodipine  - Add on other agents as needed

## 2021-03-14 NOTE — PHYSICAL THERAPY INITIAL EVALUATION ADULT - PERTINENT HX OF CURRENT PROBLEM, REHAB EVAL
patient presents with syncope at home. 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

## 2021-03-14 NOTE — PROGRESS NOTE ADULT - PROBLEM SELECTOR PLAN 1
-pt presenting with history of 2 syncopal episodes in setting of brain mass. DDx include neurologic syncope vs. cardiogenic syncope vs syncope from orthostasis. However, patient does not have a known hx of arrhythmia. Uknown whether patietn had presyncopal symptoms. Unlikely to be syncope from orthostastis given hypertension  - admit to telemetry, EKG  - Tele showing no events  - TTE  - MR brain ordered  - Neuro consulted for possible seizure  - Will obtain orthostatics -pt presenting with history of 2 syncopal episodes in setting of brain mass. DDx include neurologic syncope vs. cardiogenic syncope vs syncope from orthostasis. However, patient does not have a known hx of arrhythmia. Uknown whether patietn had presyncopal symptoms. Unlikely to be syncope from orthostastis given hypertension  - admit to telemetry, EKG  - Tele showing no events  - TTE  - MR brain ordered, but cannot obtain due to metal in body  - Neuro consulted for possible seizure  - Will obtain orthostatics  - Lidocaine for back pain

## 2021-03-15 DIAGNOSIS — E23.0 HYPOPITUITARISM: ICD-10-CM

## 2021-03-15 DIAGNOSIS — R79.89 OTHER SPECIFIED ABNORMAL FINDINGS OF BLOOD CHEMISTRY: ICD-10-CM

## 2021-03-15 DIAGNOSIS — N18.30 CHRONIC KIDNEY DISEASE, STAGE 3 UNSPECIFIED: ICD-10-CM

## 2021-03-15 DIAGNOSIS — D35.2 BENIGN NEOPLASM OF PITUITARY GLAND: ICD-10-CM

## 2021-03-15 LAB
ALBUMIN SERPL ELPH-MCNC: 3.1 G/DL — LOW (ref 3.3–5)
ALP SERPL-CCNC: 73 U/L — SIGNIFICANT CHANGE UP (ref 40–120)
ALT FLD-CCNC: 9 U/L — SIGNIFICANT CHANGE UP (ref 4–33)
ANION GAP SERPL CALC-SCNC: 10 MMOL/L — SIGNIFICANT CHANGE UP (ref 7–14)
AST SERPL-CCNC: 23 U/L — SIGNIFICANT CHANGE UP (ref 4–32)
BASOPHILS # BLD AUTO: 0.03 K/UL — SIGNIFICANT CHANGE UP (ref 0–0.2)
BASOPHILS NFR BLD AUTO: 0.5 % — SIGNIFICANT CHANGE UP (ref 0–2)
BILIRUB SERPL-MCNC: 0.2 MG/DL — SIGNIFICANT CHANGE UP (ref 0.2–1.2)
BUN SERPL-MCNC: 26 MG/DL — HIGH (ref 7–23)
CALCIUM SERPL-MCNC: 10.3 MG/DL — SIGNIFICANT CHANGE UP (ref 8.4–10.5)
CHLORIDE SERPL-SCNC: 100 MMOL/L — SIGNIFICANT CHANGE UP (ref 98–107)
CO2 SERPL-SCNC: 24 MMOL/L — SIGNIFICANT CHANGE UP (ref 22–31)
CREAT SERPL-MCNC: 1.24 MG/DL — SIGNIFICANT CHANGE UP (ref 0.5–1.3)
EOSINOPHIL # BLD AUTO: 0.22 K/UL — SIGNIFICANT CHANGE UP (ref 0–0.5)
EOSINOPHIL NFR BLD AUTO: 3.4 % — SIGNIFICANT CHANGE UP (ref 0–6)
GLUCOSE SERPL-MCNC: 94 MG/DL — SIGNIFICANT CHANGE UP (ref 70–99)
HCT VFR BLD CALC: 35.8 % — SIGNIFICANT CHANGE UP (ref 34.5–45)
HGB BLD-MCNC: 11.1 G/DL — LOW (ref 11.5–15.5)
IANC: 2.72 K/UL — SIGNIFICANT CHANGE UP (ref 1.5–8.5)
IMM GRANULOCYTES NFR BLD AUTO: 0.2 % — SIGNIFICANT CHANGE UP (ref 0–1.5)
INSULIN-LIKE GROWTH FACTOR 1 INTERPRETATION: SIGNIFICANT CHANGE UP
INSULIN-LIKE GROWTH FACTOR 1: 35 NG/ML — SIGNIFICANT CHANGE UP (ref 17–179)
LYMPHOCYTES # BLD AUTO: 2.96 K/UL — SIGNIFICANT CHANGE UP (ref 1–3.3)
LYMPHOCYTES # BLD AUTO: 45.3 % — HIGH (ref 13–44)
MAGNESIUM SERPL-MCNC: 1.7 MG/DL — SIGNIFICANT CHANGE UP (ref 1.6–2.6)
MCHC RBC-ENTMCNC: 28.4 PG — SIGNIFICANT CHANGE UP (ref 27–34)
MCHC RBC-ENTMCNC: 31 GM/DL — LOW (ref 32–36)
MCV RBC AUTO: 91.6 FL — SIGNIFICANT CHANGE UP (ref 80–100)
MONOCYTES # BLD AUTO: 0.59 K/UL — SIGNIFICANT CHANGE UP (ref 0–0.9)
MONOCYTES NFR BLD AUTO: 9 % — SIGNIFICANT CHANGE UP (ref 2–14)
NEUTROPHILS # BLD AUTO: 2.72 K/UL — SIGNIFICANT CHANGE UP (ref 1.8–7.4)
NEUTROPHILS NFR BLD AUTO: 41.6 % — LOW (ref 43–77)
NRBC # BLD: 0 /100 WBCS — SIGNIFICANT CHANGE UP
NRBC # FLD: 0 K/UL — SIGNIFICANT CHANGE UP
PHOSPHATE SERPL-MCNC: 2.7 MG/DL — SIGNIFICANT CHANGE UP (ref 2.5–4.5)
PLATELET # BLD AUTO: 384 K/UL — SIGNIFICANT CHANGE UP (ref 150–400)
POTASSIUM SERPL-MCNC: 4.1 MMOL/L — SIGNIFICANT CHANGE UP (ref 3.5–5.3)
POTASSIUM SERPL-SCNC: 4.1 MMOL/L — SIGNIFICANT CHANGE UP (ref 3.5–5.3)
PROT SERPL-MCNC: 6.7 G/DL — SIGNIFICANT CHANGE UP (ref 6–8.3)
RBC # BLD: 3.91 M/UL — SIGNIFICANT CHANGE UP (ref 3.8–5.2)
RBC # FLD: 14 % — SIGNIFICANT CHANGE UP (ref 10.3–14.5)
SODIUM SERPL-SCNC: 134 MMOL/L — LOW (ref 135–145)
WBC # BLD: 6.53 K/UL — SIGNIFICANT CHANGE UP (ref 3.8–10.5)
WBC # FLD AUTO: 6.53 K/UL — SIGNIFICANT CHANGE UP (ref 3.8–10.5)

## 2021-03-15 PROCEDURE — 72100 X-RAY EXAM L-S SPINE 2/3 VWS: CPT | Mod: 26

## 2021-03-15 PROCEDURE — 99233 SBSQ HOSP IP/OBS HIGH 50: CPT | Mod: GC

## 2021-03-15 PROCEDURE — 99223 1ST HOSP IP/OBS HIGH 75: CPT | Mod: GC

## 2021-03-15 PROCEDURE — 93306 TTE W/DOPPLER COMPLETE: CPT | Mod: 26

## 2021-03-15 RX ORDER — LABETALOL HCL 100 MG
100 TABLET ORAL EVERY 8 HOURS
Refills: 0 | Status: DISCONTINUED | OUTPATIENT
Start: 2021-03-15 | End: 2021-03-16

## 2021-03-15 RX ORDER — AMLODIPINE BESYLATE 2.5 MG/1
10 TABLET ORAL DAILY
Refills: 0 | Status: DISCONTINUED | OUTPATIENT
Start: 2021-03-15 | End: 2021-03-18

## 2021-03-15 RX ORDER — HYDRALAZINE HCL 50 MG
5 TABLET ORAL ONCE
Refills: 0 | Status: COMPLETED | OUTPATIENT
Start: 2021-03-15 | End: 2021-03-15

## 2021-03-15 RX ORDER — SODIUM CHLORIDE 9 MG/ML
1000 INJECTION INTRAMUSCULAR; INTRAVENOUS; SUBCUTANEOUS
Refills: 0 | Status: DISCONTINUED | OUTPATIENT
Start: 2021-03-15 | End: 2021-03-16

## 2021-03-15 RX ADMIN — Medication 650 MILLIGRAM(S): at 12:27

## 2021-03-15 RX ADMIN — SODIUM CHLORIDE 75 MILLILITER(S): 9 INJECTION INTRAMUSCULAR; INTRAVENOUS; SUBCUTANEOUS at 12:30

## 2021-03-15 RX ADMIN — POLYETHYLENE GLYCOL 3350 17 GRAM(S): 17 POWDER, FOR SOLUTION ORAL at 05:41

## 2021-03-15 RX ADMIN — LIDOCAINE 1 PATCH: 4 CREAM TOPICAL at 20:28

## 2021-03-15 RX ADMIN — ENOXAPARIN SODIUM 40 MILLIGRAM(S): 100 INJECTION SUBCUTANEOUS at 12:29

## 2021-03-15 RX ADMIN — LIDOCAINE 1 PATCH: 4 CREAM TOPICAL at 00:48

## 2021-03-15 RX ADMIN — AMLODIPINE BESYLATE 10 MILLIGRAM(S): 2.5 TABLET ORAL at 05:41

## 2021-03-15 RX ADMIN — Medication 5 MILLIGRAM(S): at 01:47

## 2021-03-15 RX ADMIN — POLYETHYLENE GLYCOL 3350 17 GRAM(S): 17 POWDER, FOR SOLUTION ORAL at 18:16

## 2021-03-15 RX ADMIN — Medication 100 MILLIGRAM(S): at 18:15

## 2021-03-15 RX ADMIN — LIDOCAINE 1 PATCH: 4 CREAM TOPICAL at 12:29

## 2021-03-15 RX ADMIN — Medication 650 MILLIGRAM(S): at 13:27

## 2021-03-15 NOTE — PROGRESS NOTE ADULT - PROBLEM SELECTOR PLAN 3
- pt with history of HTN, unknown home medications  - s/p amlodipine in ED, noted to be HTNsive to SBP 190s; given 5 mg hydralazine, - - Hypertensive episodes overnight 3/14  - Amlodipine increased to 10mg  - Add on other agents as needed History of HTN, unknown home medications  - s/p amlodipine in ED, noted to be HTNsive to SBP 190s; given 5 mg hydralazine, - - Hypertensive episodes overnight 3/14  - Amlodipine increased to 10mg  - Add on other agents as needed

## 2021-03-15 NOTE — PROGRESS NOTE ADULT - SUBJECTIVE AND OBJECTIVE BOX
PROGRESS NOTE:     CONTACT INFO:  Shaista Feliciano MD   PGY-1  Pager: [phone] [facility]    Patient is a 81y old  Female who presents with a chief complaint of syncope (14 Mar 2021 14:43)      SUBJECTIVE / OVERNIGHT EVENTS:  Pt was hypertensive to 190s overnight. She received hydralazine and labetalol. Amlodipine was increased to 10 qd. Patient seen and evaluated at bedside. No fever. Pt able to indicate through gesticulating that she feels well overall. Denies back pain.     ADDITIONAL REVIEW OF SYSTEMS:    Unable to assess given pt's deafness and muteness.     MEDICATIONS  (STANDING):  amLODIPine   Tablet 10 milliGRAM(s) Oral daily  enoxaparin Injectable 40 milliGRAM(s) SubCutaneous daily  lidocaine   Patch 1 Patch Transdermal daily  polyethylene glycol 3350 17 Gram(s) Oral two times a day    MEDICATIONS  (PRN):  acetaminophen   Tablet .. 650 milliGRAM(s) Oral every 6 hours PRN Mild Pain (1 - 3)      CAPILLARY BLOOD GLUCOSE        I&O's Summary    14 Mar 2021 07:01  -  15 Mar 2021 07:00  --------------------------------------------------------  IN: 236 mL / OUT: 200 mL / NET: 36 mL        PHYSICAL EXAM:  Vital Signs Last 24 Hrs  T(C): 36.8 (15 Mar 2021 05:38), Max: 36.8 (15 Mar 2021 05:38)  T(F): 98.3 (15 Mar 2021 05:38), Max: 98.3 (15 Mar 2021 05:38)  HR: 74 (15 Mar 2021 05:38) (67 - 74)  BP: 173/63 (15 Mar 2021 05:38) (148/77 - 194/80)  BP(mean): --  RR: 17 (15 Mar 2021 05:38) (17 - 18)  SpO2: 98% (15 Mar 2021 05:38) (96% - 100%)    Constitutional: NAD, well-developed, well-nourished  Ears, Nose, Mouth, and Throat: normal external ears and nose, +deaf, moist oral mucosa  Eyes: normal conjunctiva, EOMI, PERRL, Ptosis of right eye  Neck: supple, no JVD  Respiratory: Clear to auscultation bilaterally. No wheezes, rales or rhonchi. Normal respiratory effort  Cardiovascular: RRR, no M/R/G, no edema, 2+ Peripheral Pulses  Gastrointestinal: soft, nontender, nondistended, +BS, no hernia  Skin: warm, dry, no rash  Neurologic: difficult to assess due to communication barrier, sensation grossly intact, moving all extremities with no difficulty  Musculoskeletal: no clubbing, no cyanosis, no joint swelling  Psychiatric: alert, unable to obtain orientation, no agitation, anxiety    LABS:                        11.1   6.53  )-----------( 384      ( 15 Mar 2021 05:38 )             35.8     03-15    134<L>  |  100  |  26<H>  ----------------------------<  94  4.1   |  24  |  1.24    Ca    10.3      15 Mar 2021 05:35  Phos  2.7     03-15  Mg     1.7     03-15    TPro  6.7  /  Alb  3.1<L>  /  TBili  0.2  /  DBili  x   /  AST  23  /  ALT  9   /  AlkPhos  73  03-15    PT/INR - ( 13 Mar 2021 18:31 )   PT: 12.3 sec;   INR: 1.07 ratio         PTT - ( 13 Mar 2021 18:31 )  PTT:28.4 sec    Prolactin, Serum: 113.0 ng/mL (03.14.21 @ 11:36)     Cortisol AM, Serum: 9.4 ug/dL (03.13.21 @ 21:51)       Follicle Stimulating Hormone, Serum: 1.5:      Luteinizing Hormone, Serum: <0.3      Thyroid Stimulating Hormone, Serum: <0.10 uIU/mL (03.13.21 @ 21:51)   Free Thyroxine, Serum: 1.2 ng/dL (03.14.21 @ 12:28)       Insulin-Like Growth Factor 1 (03.14.21 @ 11:16)   Insulin-Like Growth Factor 1: 35 ng/mL           RADIOLOGY & ADDITIONAL TESTS:  Results Reviewed   PROGRESS NOTE:     CONTACT INFO:  Shaista Feliciano MD   PGY-1  Pager: [phone] [facility]    Patient is a 81y old  Female who presents with a chief complaint of syncope (14 Mar 2021 14:43)    SUBJECTIVE / OVERNIGHT EVENTS:  Pt was hypertensive to 190s overnight. She received hydralazine and labetalol. Amlodipine was increased to 10 qd. Patient seen and evaluated at bedside. No fever. Pt able to indicate through gesticulating that she feels well overall. Denies back pain.     ADDITIONAL REVIEW OF SYSTEMS:  Unable to assess given pt's deafness and muteness.     MEDICATIONS  (STANDING):  amLODIPine   Tablet 10 milliGRAM(s) Oral daily  enoxaparin Injectable 40 milliGRAM(s) SubCutaneous daily  lidocaine   Patch 1 Patch Transdermal daily  polyethylene glycol 3350 17 Gram(s) Oral two times a day    MEDICATIONS  (PRN):  acetaminophen   Tablet .. 650 milliGRAM(s) Oral every 6 hours PRN Mild Pain (1 - 3)    I&O's Summary    14 Mar 2021 07:01  -  15 Mar 2021 07:00  --------------------------------------------------------  IN: 236 mL / OUT: 200 mL / NET: 36 mL    PHYSICAL EXAM:  Vital Signs Last 24 Hrs  T(C): 36.8 (15 Mar 2021 05:38), Max: 36.8 (15 Mar 2021 05:38)  T(F): 98.3 (15 Mar 2021 05:38), Max: 98.3 (15 Mar 2021 05:38)  HR: 74 (15 Mar 2021 05:38) (67 - 74)  BP: 173/63 (15 Mar 2021 05:38) (148/77 - 194/80)  RR: 17 (15 Mar 2021 05:38) (17 - 18)  SpO2: 98% (15 Mar 2021 05:38) (96% - 100%)    Constitutional: NAD, well-developed, well-nourished  Ears, Nose, Mouth, and Throat: normal external ears and nose, +deaf, moist oral mucosa  Eyes: normal conjunctiva, EOMI, PERRL, Ptosis of right eye  Neck: supple, no JVD  Respiratory: Clear to auscultation bilaterally. No wheezes, rales or rhonchi. Normal respiratory effort  Cardiovascular: RRR, no M/R/G, no edema, 2+ Peripheral Pulses  Gastrointestinal: soft, nontender, nondistended, +BS, no hernia  Skin: warm, dry, no rash  Neurologic: difficult to assess due to communication barrier, sensation grossly intact, moving all extremities with no difficulty  Musculoskeletal: no clubbing, no cyanosis, no joint swelling  Psychiatric: alert, unable to obtain orientation, no agitation, anxiety    LABS:                        11.1   6.53  )-----------( 384      ( 15 Mar 2021 05:38 )             35.8     03-15    134<L>  |  100  |  26<H>  ----------------------------<  94  4.1   |  24  |  1.24    Ca    10.3      15 Mar 2021 05:35  Phos  2.7     03-15  Mg     1.7     03-15    TPro  6.7  /  Alb  3.1<L>  /  TBili  0.2  /  DBili  x   /  AST  23  /  ALT  9   /  AlkPhos  73  03-15    PT/INR - ( 13 Mar 2021 18:31 )   PT: 12.3 sec;   INR: 1.07 ratio    PTT - ( 13 Mar 2021 18:31 )  PTT:28.4 sec    Prolactin, Serum: 113.0 ng/mL (03.14.21 @ 11:36)  Cortisol AM, Serum: 9.4 ug/dL (03.13.21 @ 21:51)   Follicle Stimulating Hormone, Serum: 1.5:    Luteinizing Hormone, Serum: <0.3  Thyroid Stimulating Hormone, Serum: <0.10 uIU/mL (03.13.21 @ 21:51)   Free Thyroxine, Serum: 1.2 ng/dL (03.14.21 @ 12:28)   Insulin-Like Growth Factor 1 (03.14.21 @ 11:16)   Insulin-Like Growth Factor 1: 35 ng/mL

## 2021-03-15 NOTE — CONSULT NOTE ADULT - ASSESSMENT
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 Patient was sitting on walker around 3:15 in the kitchen. She was making noise to express that she was dizzy, Patient was then noted to have a her right arm go limp followed by her left arm. Per son's record, pt lost consciousness in her chair for 1 minute, after which she regained consciousness on her own. There was no urinary/fecal incontinence nor tongue biting noted. CT head w/o contrast was noted to extra-axial mass noted. Will need ot further imaging with Brain MRI w/wo contrast and neurosurgery recommendations with metastatic workup.     Impression/ plan:   Acute onset of syncope 2/2 to possible cardiogenic vs other etiology in the setting of Extra-axial mass either primary vs secondary.   Recommendations:   Brain MRI w/wo contrast   Brain MRA of head and neck to assess vessels   CT chest/ abd/ pelvis  Metastic workup   Neurosurgery recommendations   Telemetry   PT/OT   DVT ppx     Plan and case has been discussed with Neurology attending, Dr. Mitchell. 
81F PMHx Mute / Dead since birth presents for syncope found to have pituitary / clival mass, R eye closed at baseline due to lesion on her eyelid and R pupil NR, otherwise non-focal  - No acute neurosurgical intervention  - MRI Brain [facility] w/ Sellar protocol  - Medicine for syncope / metastatic workup  - Pituitary Labs (ACTH, TSH, LH/FSH, Prolactin, GH)
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

## 2021-03-15 NOTE — PROGRESS NOTE ADULT - PROBLEM SELECTOR PLAN 1
-pt presenting with history of 2 syncopal episodes in setting of brain mass. DDx include neurologic syncope vs. cardiogenic syncope vs syncope from orthostasis. However, patient does not have a known hx of arrhythmia. Unknown whether patient had presyncopal symptoms. Unlikely to be syncope from orthostastis given hypertension  - admit to telemetry, EKG  - Tele showing no events  - TTE  - F/u CTH w contrast, CTA neck  - Neuro consulted for possible seizure  - Will obtain orthostatics  - Lidocaine for back pain -pt presenting with history of 2 syncopal episodes in setting of brain mass. DDx include neurologic syncope vs. cardiogenic syncope vs syncope from orthostasis. However, patient does not have a known hx of arrhythmia. Unknown whether patient had presyncopal symptoms. Unlikely to be syncope from orthostastis given hypertension  - admit to telemetry, EKG  - Tele showing no events  - TTE  - F/u CTH w contrast, CTA neck  - Neuro consulted for possible seizure  - Orthostatics positive; will give gentle hydration  - Lidocaine for back pain -pt presenting with history of 2 syncopal episodes in setting of brain mass. DDx include neurologic syncope vs. cardiogenic syncope vs syncope from orthostasis. However, patient does not have a known hx of arrhythmia. Unknown whether patient had presyncopal symptoms. Unlikely to be syncope from orthostastis given hypertension  - admit to telemetry, EKG  - Tele showing no events  - TTE  - F/u CTH w contrast, CTA neck, CT chest, A/P  - Neuro consulted for possible seizure  - Orthostatics positive; will give gentle hydration  - Lidocaine for back pain

## 2021-03-15 NOTE — CONSULT NOTE ADULT - ATTENDING COMMENTS
Pt with hx as above, deaf, blind in right eye, nonverbal. Can understand some basic gestures and writing. Pt had no focal deficits as best determined on our exam. 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 If this encases or blocks the carotids, then can lead to syncope and stroke. Pending further imaging with MRI.
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  Prolactin elevated, check dilution studies. As is the level is not elevated in line with the size of the mass and may more likely be due to stalk effect rather than prolactinoma. TSH low likely early secondary hypothyroid but Free T4 normal range no need to supplement at this time. Check 8AM cortisol. Check ACTH and testing for cushings. Unable to perform MRI so patient planned for CT angio.  Recommend optho eval. Will follow. Complex patient high level decision making.    Diana Feliz MD  Division of Endocrinology  Pager: [phone]    If after 6PM or before 9AM, or on weekends/holidays, please call endocrine answering service for assistance ([phone]).  For nonurgent matters email [email or web address] for assistance.

## 2021-03-15 NOTE — PROGRESS NOTE ADULT - PROBLEM SELECTOR PLAN 2
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   - Per neurosurgery, no surgical intervention at this time.  - F/u CTH w contrast, CTA neck  - Endocrine labs obtained: Prolactin elevated - CT head non contrast revealing 3.7 x 2 cm left extra-axial mass causing effacement of ventral lower judit and hypodensity in medial left parietal cortex; neurosurgery consulted.   - Per neurosurgery, no surgical intervention at this time.  - F/u CTH w contrast, CTA neck  - Endocrine labs obtained: Prolactin elevated  - Will reorder AM cortisol as not collected at correct time  - Will consult endo for low TSH and elevated prolactin

## 2021-03-15 NOTE — CONSULT NOTE ADULT - PROBLEM SELECTOR RECOMMENDATION 9
CT head showed 3.7 x 2 cm brain mass concerning for pituitary macroadenoma v/s meningiomas metastatics or other neoplastic lesions.  Unable to obtain MRI due to ?metal in body, pending CTA head and neck   Pituitary panel reviewed and recommend as follows:  - Prolactin level is elevated to 113 but ideally it is >200 if it is prolactin secreting macroadenoma, so recommend to get prolactin dilutional testing. Patient denies any headache, unable to evaluate for galactorrhea, had right eye blindness but denies blurry vision in left eye.   - TSH is low <0.10 but FT4 and TT3 are low normal thus indicating adequate compensation so does not need thyroid hormone replacement at this time  - IGF-1 level low normal 35 thus no concern for GH over secretion  - Cortisol 9.4 (low normal) but was checked at 9 pm, recommend to recheck am cortisol along with ACTH at 8 am. 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  - Opthalmology evaluation recommended for visual field testing.  - Neurosurgery follow up recommended if CTA head and neck able to better characterize brain mass as pituitary adenoma.

## 2021-03-15 NOTE — PROGRESS NOTE ADULT - ATTENDING COMMENTS
Patient seen and examined, care d/w HS4 residents.    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    CTH: a large extra-axial high attenuated mass that appears to arise from the sella with extension into the prepontine cistern (left greater than right). This mass measures 3.2 x 1.2 cm and causes effacement of the ventral lower judit. This mass does appear to involve the sella region cannot be  from the pituitary gland. 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--if this encases or blocks the carotids, then can lead to syncope and stroke  - NSY and neuro requesting MRI however pt with ankle surgery unclear date/location and if MRI compatible--would have to d/w MRI re: how if possible to determine if MRI possible    # Brain Mass: noted on CTH, unclear if pituitary macroadenoma vs meningioma   - elevated prolactin  - TSH <0.1 however with normal free t4, ?subclinical hyperthyroid, would involve endo     # HTN  - c/w amlodipine, likely will need second agent    # Back pain  - check low back film  - lidocaine and Tylenol prn     # CKD3  - monitor, renally dose meds     Lovenox ppx, home PT  DIspo pending Patient seen and examined, care d/w HS4 residents.    In summary 82 yo F with congenital deafness/muteness, HTN presented from home with syncope noted to be hypertensive and CTH with mass.    CTH: a large extra-axial high attenuated mass that appears to arise from the sella with extension into the prepontine cistern (left greater than right). This mass measures 3.2 x 1.2 cm and causes effacement of the ventral lower judit. This mass does appear to involve the sella region cannot be  from the pituitary gland. 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--if this encases or blocks the carotids, then can lead to syncope and stroke  - NSY and neuro requesting MRI however pt with ankle surgery unclear date/location and if MRI compatible--would have to d/w MRI re: how if possible to determine if MRI possible    # Brain Mass: noted on CTH, unclear if pituitary macroadenoma vs meningioma   - elevated prolactin & TSH <0.1 however with normal free t4, ?subclinical hyperthyroid vs central underproduction (CT dose note thyroid nodules), would involve endo     # HTN  - c/w amlodipine, add labetalol     # Back pain  - check low back film  - lidocaine and Tylenol prn     # CKD3  - monitor, renally dose meds     Lovenox ppx, home PT  DIspo pending Patient seen and examined, care d/w HS4 residents.    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    CTH: a large extra-axial high attenuated mass that appears to arise from the sella with extension into the prepontine cistern (left greater than right). This mass measures 3.2 x 1.2 cm and causes effacement of the ventral lower judit. This mass does appear to involve the sella region cannot be  from the pituitary gland. 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--if this encases or blocks the carotids, then can lead to syncope and stroke  - NSY and neuro requesting MRI however pt with ankle surgery unclear date/location and if MRI compatible--would have to d/w MRI re: how if possible to determine if MRI possible    # Brain Mass: noted on CTH, unclear if pituitary macroadenoma vs meningioma   - elevated prolactin & TSH <0.1 however with normal free t4, ?subclinical hyperthyroid vs central underproduction, would involve endo     # HTN  - c/w amlodipine, add labetalol     # Back pain  - check low back film  - lidocaine and Tylenol prn     # CKD3  - monitor, renally dose meds     Lovenox ppx, home PT  DIspo pending

## 2021-03-15 NOTE — CONSULT NOTE ADULT - PROBLEM SELECTOR RECOMMENDATION 2
- Prolactin level is elevated to 113 but ideally it is >200 if it is prolactin secreting macroadenoma, so recommend to get prolactin dilutional testing. Patient denies any headache, unable to evaluate for galactorrhea, had right eye blindness but denies blurry vision in left eye.

## 2021-03-15 NOTE — PROGRESS NOTE ADULT - PROBLEM SELECTOR PLAN 6
Transitions of Care Status:  1.  Name of PCP:  2.  PCP Contacted on Admission: [ ] Y    [ ] N    3.  PCP contacted at Discharge: [ ] Y    [ ] N    [ ] N/A  4.  Post-Discharge Appointment Date and Location:  5.  Summary of Handoff given to PCP: DVT ppx  lovenox DVT ppx: Lovenox  PT rec home PT

## 2021-03-15 NOTE — PROGRESS NOTE ADULT - PROBLEM SELECTOR PLAN 4
- noted to be hypothermic to T 95 on floors  - CTM Pt with creatinine of 1.24, stable  - CTM  - Fluids as above

## 2021-03-15 NOTE — PROGRESS NOTE ADULT - ASSESSMENT
82 yo female PMH muteness and deafness from birth presents with syncope. 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

## 2021-03-15 NOTE — CONSULT NOTE ADULT - SUBJECTIVE AND OBJECTIVE BOX
· Subjective and Objective:   MRN-[id number]  Patient is a 81y old  Female who presents with a chief complaint of syncope (14 Mar 2021 14:43)    HPI:  Patient is a 80 yo F with handedness unknwown w/ pmh of chronic back pain, muteness and deafness from birth (does not use sign language) presents with syncope. History obtained by chart review and primary team, Patient was sitting on walker around 3:15 in the kitchen. She was making noise to express that she was dizzy, Patient was then noted to have a her right arm go limp followed by her left arm. Per son's record, pt lost consciousness in her chair for 1 minute, after which she regained consciousness on her own. Per son's record, patient was noted to have a similar episode last month when she fell in the kitchen There was no urinary/fecal incontinence nor tongue biting noted.      PAST MEDICAL & SURGICAL HISTORY:  Mute    Deaf    HTN (hypertension)    No significant past surgical history      FAMILY HISTORY:  No pertinent family history in first degree relatives      Social Hx:  Nonsmoker, no drug or alcohol use    Home Medications:    MEDICATIONS  (STANDING):  amLODIPine   Tablet 5 milliGRAM(s) Oral daily  enoxaparin Injectable 40 milliGRAM(s) SubCutaneous daily  lidocaine   Patch 1 Patch Transdermal daily  polyethylene glycol 3350 17 Gram(s) Oral two times a day    MEDICATIONS  (PRN):  acetaminophen   Tablet .. 650 milliGRAM(s) Oral every 6 hours PRN Mild Pain (1 - 3)    Allergies  No Known Allergies    Intolerances      REVIEW OF SYSTEMS- unable to assess.     Vital Signs Last 24 Hrs  T(C): 36.6 (14 Mar 2021 12:59), Max: 37 (13 Mar 2021 17:55)  T(F): 97.9 (14 Mar 2021 12:59), Max: 98.6 (13 Mar 2021 17:55)  HR: 72 (14 Mar 2021 12:59) (65 - 101)  BP: 148/77 (14 Mar 2021 12:59) (135/101 - 204/62)  BP(mean): --  RR: 17 (14 Mar 2021 12:59) (13 - 18)  SpO2: 100% (14 Mar 2021 12:59) (94% - 100%)    GENERAL EXAM:  Constitutional: awake and alert. NAD  HEENT: PERRL, EOMI      NEUROLOGICAL EXAM:  MS: Awake and Alert and follows commands by showing patient what to do.   CN: VFF, EOMI, PERRL,    V1-3 intact, no facial asymmetry, t/p midline, SCM/trap intact.  Motor: Strength: 5/5 4x.   Tone: normal. Bulk: normal.   DTR 2+ symm.  in biceps and patellar b/l.   Sensation: intact to LT and Temperature 4x.   Coordination: finger to nose intact b/l.     NIHSS  mRS    Labs:   cbc                      11.3   4.17  )-----------( 448      ( 14 Mar 2021 12:28 )             36.4     [id number]    137  |  99  |  19  ----------------------------<  149<H>  3.6   |  25  |  1.28    Ca    10.4      14 Mar 2021 12:28  Phos  2.3     03-14  Mg     1.8     03-14    TPro  7.3  /  Alb  3.5  /  TBili  0.4  /  DBili  x   /  AST  24  /  ALT  7   /  AlkPhos  83  03-14    CoagsPT/INR - ( 13 Mar 2021 18:31 )   PT: 12.3 sec;   INR: 1.07 ratio         PTT - ( 13 Mar 2021 18:31 )  PTT:28.4 sec  Lipids  A1C  CardiacMarkers    LFTsLIVER FUNCTIONS - ( 14 Mar 2021 12:28 )  Alb: 3.5 g/dL / Pro: 7.3 g/dL / ALK PHOS: 83 U/L / ALT: 7 U/L / AST: 24 U/L / GGT: x           UA  CSF  Immunological Labs    Radiology:  CT head w/o contrast:   extra-axial: There is a large extra-axial high attenuated mass that appears to arise from the sella with extension into the prepontine cistern (left greater than right). This mass measures 3.2 x 1.2 cm and causes effacement of the ventral lower judit. This mass does appear to involve the sella region cannot be  from the pituitary gland. 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 Hypodensity in the medial left parietal cortex (series 2 image 17) as well as the left parietal subcortical region, this could be compatible with an area of ischemia though the possibly of gliosis secondary to an old infarct cannot because excluded.                 
    HPI:  82 yo female PMH chronic back pain, muteness and deafness from birth (does not use sign language) presented with syncope. 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       Endocrine history:  Patient is 81 year old female (mute and deaf, does not use official sign language as per son, they have created their own simple sign language to communicate at home) with no prior endocrine history here with syncope. 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 Neurosurgery evaluated and did not recommend any surgical intervention at this time. Pituitary hormonal panel testing noted as follows:   Prolactin : 113 (elevated)  TSH : <0.10 (low)  Free T4 : 1.2 (low normal)   Total T3 : 99 (low normal)    IGF-1 : 35 (normal)  LH: <0.3 (low)  FSH: 1.5 (low)  AM cortisol (checked at night around 10 pm) : 9.4 (low normal).  Patient only complaining of lower back pain and leg pains currently, denies any headache, neck pain, blurry vision in left eye (right eye blindness). Unable to obtain complete review of system.   Spoke to son over the phone, who stated patient had no known prior history of any pituitary or any other endocrine disorders.       PAST MEDICAL & SURGICAL HISTORY:  Mute  Deaf  HTN (hypertension)  No significant past surgical history        FAMILY HISTORY:  No pertinent family history in first degree relatives        Social History:  Per son, no history of smoking or drinking alcohol      Outpatient Medications:  None      MEDICATIONS  (STANDING):  amLODIPine   Tablet 10 milliGRAM(s) Oral daily  enoxaparin Injectable 40 milliGRAM(s) SubCutaneous daily  labetalol 100 milliGRAM(s) Oral every 8 hours  lidocaine   Patch 1 Patch Transdermal daily  polyethylene glycol 3350 17 Gram(s) Oral two times a day  sodium chloride 0.9%. 1000 milliLiter(s) (75 mL/Hr) IV Continuous <Continuous>    MEDICATIONS  (PRN):  acetaminophen   Tablet .. 650 milliGRAM(s) Oral every 6 hours PRN Mild Pain (1 - 3)      Allergies  No Known Allergies        Review of System:  Unable to obtain detailed ROS as patient does not use sign language and she is deaf and mute.   Eyes: +right eye blindness, no blurry vision in left eye  Neuro: No headache  HEENT: No pain  Musculoskeletal: + back pain, + leg pain      PHYSICAL EXAM:  VITALS: T(C): 36.9 (03-15-21 @ 12:21)  T(F): 98.4 (03-15-21 @ 12:21), Max: 98.4 (03-15-21 @ 12:21)  HR: 87 (03-15-21 @ 12:21) (67 - 87)  BP: 198/88 (03-15-21 @ 12:21) (161/74 - 198/88)  RR:  (17 - 19)  SpO2:  (94% - 100%)  Wt(kg): --  GENERAL: NAD, well-groomed, well-developed  EYES: No proptosis, anicteric  HEENT:  Atraumatic, Normocephalic  THYROID: Normal size, no palpable nodules  RESPIRATORY: Clear to auscultation bilaterally; No rales, rhonchi, wheezing, or rubs  CARDIOVASCULAR: Regular rate and rhythm; no peripheral edema  GI: Soft, nontender, non distended  SKIN: Dry, intact  NEURO: awake, alert  PSYCH: reactive affect, euthymic mood                              11.1   6.53  )-----------( 384      ( 15 Mar 2021 05:38 )             35.8       03-15    134<L>  |  100  |  26<H>  ----------------------------<  94  4.1   |  24  |  1.24    EGFR if : 47<L>  EGFR if non : 40<L>    Ca    10.3      03-15  Mg     1.7     03-15  Phos  2.7     03-15    TPro  6.7  /  Alb  3.1<L>  /  TBili  0.2  /  DBili  x   /  AST  23  /  ALT  9   /  AlkPhos  73  03-15      Thyroid Function Tests:  03-14 @ 12:28 FreeT4 1.2 T3 99   03-13 @ 21:51 TSH <0.10 FreeT4 1.3 T3 108     Prolactin, Serum: 113.0 ng/mL (03.14.21 @ 11:36)    Insulin-Like Growth Factor 1: 35 ng/mL (03.14.21 @ 11:16)    Cortisol AM, Serum: 9.4 ug/dL (03.13.21 @ 21:51)    Luteinizing Hormone, Serum: <0.3 IU/L (03.14.21 @ 11:36)    Follicle Stimulating Hormone, Serum: 1.5 IU/L (03.14.21 @ 11:36)        
p (2900)     HPI:  Dhruv: Story from son: 1 month ago, pt passed out in the kitchen; didn't hit head; didn't seek care. Today, felt dizzy, began to faint while sitting in her walker, slow breathing; occurred 3:15 PM. Fainted for a few seconds. Didn't fall. No CP/SOB/HA. Has chronic back pain; not worse than usual. Hasn't been sick recently. 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 Not know PCP. Has been eating/drinking well recently.    	Khris Mendez     (13 Mar 2021 22:18)      Imaging:    Exam:  Awake / alert, mute and deaf since birth, appropriately communicates via motioning  DICKERSON strongly AG  L pupil 4 and reactive, R eye closed at basline with lesion, Pupil 4 NR  --Anticoagulation:    =====================  PAST MEDICAL HISTORY   Mute    Deaf    HTN (hypertension)      PAST SURGICAL HISTORY         MEDICATIONS:  Antibiotics:    Neuro:    Other:      SOCIAL HISTORY:   Occupation:   Marital Status:     FAMILY HISTORY:      ROS: Negative except per HPI    LABS:  PT/INR - ( 13 Mar 2021 18:31 )   PT: 12.3 sec;   INR: 1.07 ratio         PTT - ( 13 Mar 2021 18:31 )  PTT:28.4 sec                        12.0   5.49  )-----------( 455      ( 13 Mar 2021 18:31 )             38.5     03-13    139  |  103  |  18  ----------------------------<  104<H>  4.6   |  23  |  1.27    Ca    10.6<H>      13 Mar 2021 18:31  Phos  2.8     03-13  Mg     1.8     03-13    TPro  8.0  /  Alb  3.8  /  TBili  0.4  /  DBili  x   /  AST  35<H>  /  ALT  12  /  AlkPhos  88  03-13

## 2021-03-16 LAB
ACTH SER-ACNC: 23.6 PG/ML — SIGNIFICANT CHANGE UP (ref 7.2–63.3)
ANION GAP SERPL CALC-SCNC: 9 MMOL/L — SIGNIFICANT CHANGE UP (ref 7–14)
BUN SERPL-MCNC: 21 MG/DL — SIGNIFICANT CHANGE UP (ref 7–23)
CALCIUM SERPL-MCNC: 10.3 MG/DL — SIGNIFICANT CHANGE UP (ref 8.4–10.5)
CHLORIDE SERPL-SCNC: 102 MMOL/L — SIGNIFICANT CHANGE UP (ref 98–107)
CO2 SERPL-SCNC: 24 MMOL/L — SIGNIFICANT CHANGE UP (ref 22–31)
CORTIS AM PEAK SERPL-MCNC: 13.2 UG/DL — SIGNIFICANT CHANGE UP (ref 6–18.4)
CREAT SERPL-MCNC: 1.1 MG/DL — SIGNIFICANT CHANGE UP (ref 0.5–1.3)
GLUCOSE SERPL-MCNC: 90 MG/DL — SIGNIFICANT CHANGE UP (ref 70–99)
HCT VFR BLD CALC: 35.5 % — SIGNIFICANT CHANGE UP (ref 34.5–45)
HGB BLD-MCNC: 11.1 G/DL — LOW (ref 11.5–15.5)
MAGNESIUM SERPL-MCNC: 1.7 MG/DL — SIGNIFICANT CHANGE UP (ref 1.6–2.6)
MCHC RBC-ENTMCNC: 29.1 PG — SIGNIFICANT CHANGE UP (ref 27–34)
MCHC RBC-ENTMCNC: 31.3 GM/DL — LOW (ref 32–36)
MCV RBC AUTO: 92.9 FL — SIGNIFICANT CHANGE UP (ref 80–100)
NRBC # BLD: 0 /100 WBCS — SIGNIFICANT CHANGE UP
NRBC # FLD: 0 K/UL — SIGNIFICANT CHANGE UP
PHOSPHATE SERPL-MCNC: 3.1 MG/DL — SIGNIFICANT CHANGE UP (ref 2.5–4.5)
PLATELET # BLD AUTO: 409 K/UL — HIGH (ref 150–400)
POTASSIUM SERPL-MCNC: 4.2 MMOL/L — SIGNIFICANT CHANGE UP (ref 3.5–5.3)
POTASSIUM SERPL-SCNC: 4.2 MMOL/L — SIGNIFICANT CHANGE UP (ref 3.5–5.3)
PROLACTIN SERPL-MCNC: 70.4 NG/ML — HIGH (ref 3.4–24.1)
PROLACTIN SERPL-MCNC: 75.3 NG/ML — HIGH (ref 3.4–24.1)
RBC # BLD: 3.82 M/UL — SIGNIFICANT CHANGE UP (ref 3.8–5.2)
RBC # FLD: 14.3 % — SIGNIFICANT CHANGE UP (ref 10.3–14.5)
SODIUM SERPL-SCNC: 135 MMOL/L — SIGNIFICANT CHANGE UP (ref 135–145)
WBC # BLD: 5.51 K/UL — SIGNIFICANT CHANGE UP (ref 3.8–10.5)
WBC # FLD AUTO: 5.51 K/UL — SIGNIFICANT CHANGE UP (ref 3.8–10.5)

## 2021-03-16 PROCEDURE — 73600 X-RAY EXAM OF ANKLE: CPT | Mod: 26,50

## 2021-03-16 PROCEDURE — 70498 CT ANGIOGRAPHY NECK: CPT | Mod: 26

## 2021-03-16 PROCEDURE — 70496 CT ANGIOGRAPHY HEAD: CPT | Mod: 26

## 2021-03-16 PROCEDURE — 74177 CT ABD & PELVIS W/CONTRAST: CPT | Mod: 26

## 2021-03-16 PROCEDURE — 99233 SBSQ HOSP IP/OBS HIGH 50: CPT | Mod: GC

## 2021-03-16 PROCEDURE — 71260 CT THORAX DX C+: CPT | Mod: 26

## 2021-03-16 RX ORDER — LABETALOL HCL 100 MG
100 TABLET ORAL EVERY 8 HOURS
Refills: 0 | Status: DISCONTINUED | OUTPATIENT
Start: 2021-03-16 | End: 2021-03-17

## 2021-03-16 RX ORDER — DEXAMETHASONE 0.5 MG/5ML
1 ELIXIR ORAL ONCE
Refills: 0 | Status: COMPLETED | OUTPATIENT
Start: 2021-03-16 | End: 2021-03-16

## 2021-03-16 RX ORDER — SENNA PLUS 8.6 MG/1
2 TABLET ORAL AT BEDTIME
Refills: 0 | Status: DISCONTINUED | OUTPATIENT
Start: 2021-03-16 | End: 2021-03-18

## 2021-03-16 RX ADMIN — LIDOCAINE 1 PATCH: 4 CREAM TOPICAL at 20:43

## 2021-03-16 RX ADMIN — LIDOCAINE 1 PATCH: 4 CREAM TOPICAL at 00:40

## 2021-03-16 RX ADMIN — LIDOCAINE 1 PATCH: 4 CREAM TOPICAL at 23:26

## 2021-03-16 RX ADMIN — Medication 650 MILLIGRAM(S): at 06:02

## 2021-03-16 RX ADMIN — Medication 650 MILLIGRAM(S): at 13:21

## 2021-03-16 RX ADMIN — SENNA PLUS 2 TABLET(S): 8.6 TABLET ORAL at 22:16

## 2021-03-16 RX ADMIN — ENOXAPARIN SODIUM 40 MILLIGRAM(S): 100 INJECTION SUBCUTANEOUS at 12:21

## 2021-03-16 RX ADMIN — AMLODIPINE BESYLATE 10 MILLIGRAM(S): 2.5 TABLET ORAL at 06:02

## 2021-03-16 RX ADMIN — Medication 650 MILLIGRAM(S): at 12:21

## 2021-03-16 RX ADMIN — Medication 100 MILLIGRAM(S): at 06:02

## 2021-03-16 RX ADMIN — Medication 650 MILLIGRAM(S): at 06:25

## 2021-03-16 RX ADMIN — POLYETHYLENE GLYCOL 3350 17 GRAM(S): 17 POWDER, FOR SOLUTION ORAL at 12:24

## 2021-03-16 RX ADMIN — LIDOCAINE 1 PATCH: 4 CREAM TOPICAL at 12:22

## 2021-03-16 NOTE — PROGRESS NOTE ADULT - PROBLEM SELECTOR PLAN 2
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   - Per neurosurgery, no surgical intervention at this time.  - F/u CTH w contrast, CTA neck  - Endocrine labs obtained: Prolactin elevated  - Will reorder AM cortisol as not collected at correct time  - Endocrine consulted; unconcerned about low TSH, FSH, LH; ordered prolactin dilutional study  - Endo suggesting ophtho eval for VF testing although unclear how pt can perform this. 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   - Per neurosurgery, no surgical intervention at this time.  - F/u CTH w contrast, CTA neck  - Endocrine labs obtained: Prolactin elevated  - Will reorder AM cortisol as not collected at correct time  - Endocrine consulted; unconcerned about low TSH, FSH, LH; ordered prolactin dilutional study, ACTH  - Endo suggesting ophtho eval for VF testing although unclear how pt can perform this.

## 2021-03-16 NOTE — CHART NOTE - NSCHARTNOTEFT_GEN_A_CORE
< from: CT Angio Head w/ IV Cont (03.16.21 @ 12:32) >    IMPRESSION: Normal CTA of the head and neck. Large enhancing mass in centered in the sella extending into the suprasellar cistern, cavernous sinuses and prepontine cistern with mild mass effect on the left pontomedullary junction. Differential diagnosis includes meningioma, pituitary adenoma, lymphoma, metastases as well as other etiologies.    < end of copied text >    < from: CT Chest/Abdomen/Pelvis w/ IV Cont (03.16.21 @ 12:32) >    IMPRESSION:  No CT evidence of intrathoracic or intra-abdominal malignancy.    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  No further inpatient neurology workup at this time.  Outpatient neurosurgery follow up for possible resection  Outpatient neurology follow up when ready for discharge  Outpatient endocrine and neuro-ophthalmology follow up (Dr. Blake, [address] [place] [address])     Case discussed with neurology attending, Dr. Elkins < from: CT Angio Head w/ IV Cont (03.16.21 @ 12:32) >    IMPRESSION: Normal CTA of the head and neck. Large enhancing mass in centered in the sella extending into the suprasellar cistern, cavernous sinuses and prepontine cistern with mild mass effect on the left pontomedullary junction. Differential diagnosis includes meningioma, pituitary adenoma, lymphoma, metastases as well as other etiologies.    < end of copied text >    < from: CT Chest/Abdomen/Pelvis w/ IV Cont (03.16.21 @ 12:32) >    IMPRESSION:  No CT evidence of intrathoracic or intra-abdominal malignancy.    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  No further inpatient neurology workup at this time.  cardiac w/u of syncope showed orthostatic hypotension, which should cont to be managed  Outpatient neurosurgery follow up for possible resection  Outpatient endocrine and neuro-ophthalmology follow up (Dr. Blake, [address] [place] [address])     Case discussed with neurology attending, Dr. Elkins

## 2021-03-16 NOTE — PROGRESS NOTE ADULT - PROBLEM SELECTOR PLAN 4
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       Angella Pedro MD  Endocrine Fellow  Pager [phone] from 9 am to 5 pm Mon to Fri.  After hours and on weekends please call [phone]

## 2021-03-16 NOTE — PROGRESS NOTE ADULT - PROBLEM SELECTOR PLAN 3
- TSH is low <0.10 likely due to secondary hypothyroidism but FT4 and TT3 are low normal thus indicating adequate compensation so does not need thyroid hormone replacement at this time.

## 2021-03-16 NOTE — PROGRESS NOTE ADULT - ATTENDING COMMENTS
Patient seen and examined, care d/w HS4 residents.    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    CTH: a large extra-axial high attenuated mass that appears to arise from the sella with extension into the prepontine cistern (left greater than right). This mass measures 3.2 x 1.2 cm and causes effacement of the ventral lower judit. This mass does appear to involve the sella region cannot be  from the pituitary gland. 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 vs meningioma   - elevated prolactin & TSH <0.1 however with normal free t4, ?subclinical hyperthyroid vs central underproduction  - endo consulted 3/15, appreciate recs, labs as rec ordered   - NSY and neuro requesting MRI however pt with ankle surgery unclear date/location and if MRI compatible--check ankle film to assess for hardware and can d/w MRI re: any means of determining MRI compatibility   - CTAP to r/o metastatic disease     # HTN  - c/w amlodipine, add second agent     # Back pain: LBP film no fx   - lidocaine and Tylenol prn     # CKD3  - monitor, renally dose meds     Lovenox ppx, home PT  DIspo pending work-up

## 2021-03-16 NOTE — PROGRESS NOTE ADULT - ATTENDING COMMENTS
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 AM cortisol normal. Prolactin elevation is likely due to stalk effect and not prolactinoma. Will follow. Complex patient high level decision making.    Diana Feliz MD  Division of Endocrinology  Pager: [phone]    If after 6PM or before 9AM, or on weekends/holidays, please call endocrine answering service for assistance ([phone]).  For nonurgent matters email [email or web address] for assistance.

## 2021-03-16 NOTE — PROGRESS NOTE ADULT - SUBJECTIVE AND OBJECTIVE BOX
Follow-up on: Concern for pituitary adenoma    Interval events: CTA head and neck imaging completed and again noted to have sellar mass concerning for pituitary adenoma and with suprasellar extensions. AM cortisol resulted wnl. Prolactin dilutional study, ACTH and 24 hr urine cortisol pending.       MEDICATIONS  (STANDING):  amLODIPine   Tablet 10 milliGRAM(s) Oral daily  enoxaparin Injectable 40 milliGRAM(s) SubCutaneous daily  lidocaine   Patch 1 Patch Transdermal daily  polyethylene glycol 3350 17 Gram(s) Oral two times a day  senna 2 Tablet(s) Oral at bedtime    MEDICATIONS  (PRN):  acetaminophen   Tablet .. 650 milliGRAM(s) Oral every 6 hours PRN Mild Pain (1 - 3)  bisacodyl Suppository 10 milliGRAM(s) Rectal daily PRN Constipation  labetalol 100 milliGRAM(s) Oral every 8 hours PRN SBP>180      PHYSICAL EXAM:  VITALS: T(C): 36.7 (03-16-21 @ 13:11)  T(F): 98 (03-16-21 @ 13:11), Max: 98 (03-16-21 @ 06:01)  HR: 70 (03-16-21 @ 13:11) (67 - 79)  BP: 108/58 (03-16-21 @ 13:11) (108/58 - 173/77)  RR:  (15 - 17)  SpO2:  (100% - 100%)  Wt(kg): --  GENERAL: NAD, well-groomed  EYES: right eye blindness, no proptosis or injection of left eye  HEENT:  mute and deaf  RESPIRATORY: saturating well on room air, not in any acute respiratory distress  CARDIOVASCULAR: Regular rate; no peripheral edema  GI: Soft, nontender, non distended      03-16    135  |  102  |  21  ----------------------------<  90  4.2   |  24  |  1.10    EGFR if : 54<L>  EGFR if non : 46<L>    Ca    10.3      03-16  Mg     1.7     03-16  Phos  3.1     03-16    TPro  6.7  /  Alb  3.1<L>  /  TBili  0.2  /  DBili  x   /  AST  23  /  ALT  9   /  AlkPhos  73  03-15      Thyroid Function Tests:  03-14 @ 12:28  FreeT4 1.2 T3 99  03-13 @ 21:51 TSH <0.10 FreeT4 1.3 T3 108     Cortisol AM, Serum: 13.2 ug/dL (03.16.21 @ 09:24)    Prolactin, Serum: 113.0 ng/mL (03.14.21 @ 11:36)    Insulin-Like Growth Factor 1: 35 ng/mL (03.14.21 @ 11:16)    Luteinizing Hormone, Serum: <0.3 IU/L (03.14.21 @ 11:36)    Follicle Stimulating Hormone, Serum: 1.5 IU/L (03.14.21 @ 11:36)        < from: CT Angio Head w/ IV Cont (03.16.21 @ 12:32) >  Large enhancing mass in centered in the sella extending into the suprasellar cistern, cavernous sinuses and prepontine cistern with mild mass effect on the left pontomedullary junction. Differential diagnosis includes meningioma, pituitary adenoma, lymphoma, metastases as well as other etiologies.    < end of copied text >                       Follow-up on: Concern for pituitary adenoma    Interval events: CTA head and neck imaging completed and again noted to have sellar mass concerning for pituitary adenoma and with suprasellar extensions. AM cortisol resulted wnl. Prolactin dilutional study, ACTH and 24 hr urine cortisol pending.       MEDICATIONS  (STANDING):  amLODIPine   Tablet 10 milliGRAM(s) Oral daily  enoxaparin Injectable 40 milliGRAM(s) SubCutaneous daily  lidocaine   Patch 1 Patch Transdermal daily  polyethylene glycol 3350 17 Gram(s) Oral two times a day  senna 2 Tablet(s) Oral at bedtime    MEDICATIONS  (PRN):  acetaminophen   Tablet .. 650 milliGRAM(s) Oral every 6 hours PRN Mild Pain (1 - 3)  bisacodyl Suppository 10 milliGRAM(s) Rectal daily PRN Constipation  labetalol 100 milliGRAM(s) Oral every 8 hours PRN SBP>180      PHYSICAL EXAM:  VITALS: T(C): 36.7 (03-16-21 @ 13:11)  T(F): 98 (03-16-21 @ 13:11), Max: 98 (03-16-21 @ 06:01)  HR: 70 (03-16-21 @ 13:11) (67 - 79)  BP: 108/58 (03-16-21 @ 13:11) (108/58 - 173/77)  RR:  (15 - 17)  SpO2:  (100% - 100%)  Wt(kg): --  GENERAL: NAD, well-groomed  EYES: right eye blindness, no proptosis or injection of left eye  HEENT:  mute and deaf  RESPIRATORY: saturating well on room air, not in any acute respiratory distress  CARDIOVASCULAR: Regular rate; no peripheral edema  GI: Soft, nontender, non distended      03-16    135  |  102  |  21  ----------------------------<  90  4.2   |  24  |  1.10    EGFR if : 54<L>  EGFR if non : 46<L>    Ca    10.3      03-16  Mg     1.7     03-16  Phos  3.1     03-16    TPro  6.7  /  Alb  3.1<L>  /  TBili  0.2  /  DBili  x   /  AST  23  /  ALT  9   /  AlkPhos  73  03-15      Thyroid Function Tests:  03-14 @ 12:28  FreeT4 1.2 T3 99  03-13 @ 21:51 TSH <0.10 FreeT4 1.3 T3 108     Cortisol AM, Serum: 13.2 ug/dL (03.16.21 @ 09:24)    Prolactin Dilution Study (03.16.21 @ 14:17)    Prolactin Undiluted: 75.3 ng/mL    Prolactin Diluted: 70.4 ng/mL    Prolactin, Serum: 113.0 ng/mL (03.14.21 @ 11:36)    Insulin-Like Growth Factor 1: 35 ng/mL (03.14.21 @ 11:16)    Luteinizing Hormone, Serum: <0.3 IU/L (03.14.21 @ 11:36)    Follicle Stimulating Hormone, Serum: 1.5 IU/L (03.14.21 @ 11:36)        < from: CT Angio Head w/ IV Cont (03.16.21 @ 12:32) >  Large enhancing mass in centered in the sella extending into the suprasellar cistern, cavernous sinuses and prepontine cistern with mild mass effect on the left pontomedullary junction. Differential diagnosis includes meningioma, pituitary adenoma, lymphoma, metastases as well as other etiologies.    < end of copied text >

## 2021-03-16 NOTE — PROGRESS NOTE ADULT - PROBLEM SELECTOR PLAN 1
CT head followed by CTA head showed 3.7 x 2 cm sellar mass extending into the suprasellar cistern, cavernous sinuses and prepontine cistern with mild mass effect on the left pontomedullary junction. Differential diagnosis includes meningioma, pituitary adenoma, lymphoma, metastases as well as other etiologies. Neurosurgery evaluated and recommended outpatient follow up.   Pituitary panel reviewed and recommend as follows:  - Prolactin level is elevated to 113 but ideally it is >200 if it is prolactin secreting macroadenoma, so recommend to get prolactin dilutional study (testing)  - TSH is low <0.10 but FT4 and TT3 are low normal thus indicating adequate compensation so does not need thyroid hormone replacement at this time  - IGF-1 level low normal 35 thus no concern for GH over secretion  - AM Cortisol 13.2 (wnl). ACTH is testing. 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  - Opthalmology evaluation recommended for visual field testing. CT head followed by CTA head showed 3.7 x 2 cm sellar mass extending into the suprasellar cistern, cavernous sinuses and prepontine cistern with mild mass effect on the left pontomedullary junction. Differential diagnosis includes meningioma, pituitary adenoma, lymphoma, metastases as well as other etiologies. Neurosurgery evaluated and recommended outpatient follow up.   Pituitary panel reviewed and recommend as follows:  - Prolactin level is elevated to 113 but dilutional study done and noted with diluted prolactin 70.4, undiluted 75.3; thus likely suggesting elevation of prolactin is due to stalk effect and not from prolactin secreting adenoma.   - TSH is low <0.10 but FT4 and TT3 are low normal thus indicating adequate compensation so does not need thyroid hormone replacement at this time  - IGF-1 level low normal 35 thus no concern for GH over secretion  - AM Cortisol 13.2 (wnl). ACTH is testing. Pending 24 hr urine cortisol collection to rule out Cushing's. Once 24 hr urine collection is completed, recommend to do low dose dexamethasone suppression test: give 1 mg dexamethasone PO at 11 pm tonight, then tomorrow morning check 8 am Cortisol, ACTH and dexamethasone.   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  - Opthalmology evaluation recommended for visual field testing.  Plan discussed with primary team.

## 2021-03-16 NOTE — PROGRESS NOTE ADULT - PROBLEM SELECTOR PLAN 1
-pt presenting with history of 2 syncopal episodes in setting of brain mass. DDx include neurologic syncope vs. cardiogenic syncope vs syncope from orthostasis. However, patient does not have a known hx of arrhythmia. Unknown whether patient had presyncopal symptoms. Unlikely to be syncope from orthostastis given hypertension  - Tele showing no events  - TTE showing mild diastolic dysfunction  - F/u CTH w contrast, CTA neck, CT chest, A/P  - Neuro consulted for possible seizure  - Orthostatics positive; s/p gentle hydration 12h  - Lidocaine for back pain  - Xray spine showing no fractures; mild osteopenia -pt presenting with history of 2 syncopal episodes in setting of brain mass. DDx include neurologic syncope vs. cardiogenic syncope vs syncope from orthostasis. However, patient does not have a known hx of arrhythmia. Unknown whether patient had presyncopal symptoms. Unlikely to be syncope from orthostastis given hypertension  - Tele showing no events; will D/C  - TTE showing mild diastolic dysfunction  - F/u CTH w contrast, CTA neck, CT chest, A/P  - Will obtain ankle xray to evaluate surgical site; possible MRI if no metal  - Neuro followingf  - Orthostatics positive; s/p gentle hydration 12h  - Lidocaine for back pain  - Xray spine showing no fractures; mild osteopenia

## 2021-03-16 NOTE — PROGRESS NOTE ADULT - PROBLEM SELECTOR PLAN 2
- Prolactin level is elevated to 113 but ideally it is >200 if it is prolactin secreting macroadenoma, so recommended to get prolactin dilutional study (testing). - Prolactin level is elevated to 113 but dilutional study done and noted with diluted prolactin 70.4, undiluted 75.3; thus likely suggesting elevation of prolactin is due to stalk effect and not from prolactin secreting adenoma.

## 2021-03-16 NOTE — PROGRESS NOTE ADULT - PROBLEM SELECTOR PLAN 3
History of HTN, unknown home medications  - s/p amlodipine in ED, noted to be HTNsive to SBP 190s; given 5 mg hydralazine, - - Hypertensive episodes overnight 3/14  - Amlodipine increased to 10mg  - Added labetalol 100mg TID History of HTN, unknown home medications  - s/p amlodipine in ED, noted to be HTNsive to SBP 190s; given 5 mg hydralazine, - - Hypertensive episodes overnight 3/14  - Amlodipine increased to 10mg  - Added labetalol 100mg TID PRN

## 2021-03-16 NOTE — CHART NOTE - NSCHARTNOTEFT_GEN_A_CORE
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 CTA reviewed. No acute neurosurgical intervention at this time. Pt can follow up with Dr. Chaney in the office to determine ability to obtain MRI.    < from: CT Angio Neck w/ IV Cont (03.16.21 @ 12:33) >    IMPRESSION: Normal CTA of the head and neck. Large enhancing mass in centered in the sella extending into the suprasellar cistern, cavernous sinuses and prepontine cistern with mild mass effect on the left pontomedullary junction. Differential diagnosis includes meningioma, pituitary adenoma, lymphoma, metastases as well as other etiologies.    < end of copied text >    Case d/w Dr. Chaney

## 2021-03-16 NOTE — PROGRESS NOTE ADULT - ASSESSMENT
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

## 2021-03-16 NOTE — PROGRESS NOTE ADULT - PROBLEM SELECTOR PLAN 5
- noted to be hypothermic to T 95 on floors  - CTM - noted to be hypothermic to T 95 on floors, resolved  - CTM

## 2021-03-16 NOTE — PROGRESS NOTE ADULT - SUBJECTIVE AND OBJECTIVE BOX
PROGRESS NOTE:     CONTACT INFO:  Shaista Feliciano MD   PGY-1  Pager: [phone] [facility]    Patient is a 81y old  Female who presents with a chief complaint of syncope (15 Mar 2021 14:25)      SUBJECTIVE / OVERNIGHT EVENTS:  Pt refused labetalol overnight. Patient seen and evaluated at bedside. Pt appears comfortable. Unable to assess systems due to pt's deafness, muteness, inability to use sign language, inability to write.     ADDITIONAL REVIEW OF SYSTEMS:    As above.     MEDICATIONS  (STANDING):  amLODIPine   Tablet 10 milliGRAM(s) Oral daily  enoxaparin Injectable 40 milliGRAM(s) SubCutaneous daily  labetalol 100 milliGRAM(s) Oral every 8 hours  lidocaine   Patch 1 Patch Transdermal daily  polyethylene glycol 3350 17 Gram(s) Oral two times a day  sodium chloride 0.9%. 1000 milliLiter(s) (75 mL/Hr) IV Continuous <Continuous>    MEDICATIONS  (PRN):  acetaminophen   Tablet .. 650 milliGRAM(s) Oral every 6 hours PRN Mild Pain (1 - 3)      CAPILLARY BLOOD GLUCOSE        I&O's Summary      PHYSICAL EXAM:  Vital Signs Last 24 Hrs  T(C): 36.7 (16 Mar 2021 06:01), Max: 36.9 (15 Mar 2021 12:21)  T(F): 98 (16 Mar 2021 06:01), Max: 98.4 (15 Mar 2021 12:21)  HR: 67 (16 Mar 2021 06:01) (67 - 87)  BP: 163/78 (16 Mar 2021 06:01) (140/77 - 198/88)  BP(mean): --  RR: 17 (16 Mar 2021 06:01) (15 - 19)  SpO2: 100% (16 Mar 2021 06:01) (94% - 100%)    Constitutional: NAD, well-developed, well-nourished  Ears, Nose, Mouth, and Throat: normal external ears and nose, +deaf, moist oral mucosa  Eyes: normal conjunctiva, EOMI, PERRL, Ptosis of right eye  Neck: supple, no JVD  Respiratory: Clear to auscultation bilaterally. No wheezes, rales or rhonchi. Normal respiratory effort  Cardiovascular: RRR, no M/R/G, no edema, 2+ Peripheral Pulses  Gastrointestinal: soft, nontender, nondistended, +BS, no hernia  Skin: warm, dry, no rash  Neurologic: difficult to assess due to communication barrier, sensation grossly intact, moving all extremities with no difficulty  Musculoskeletal: no clubbing, no cyanosis, no joint swelling  Psychiatric: alert, unable to obtain orientation, no agitation, anxiety    LABS:                        11.1   6.53  )-----------( 384      ( 15 Mar 2021 05:38 )             35.8     03-15    134<L>  |  100  |  26<H>  ----------------------------<  94  4.1   |  24  |  1.24    Ca    10.3      15 Mar 2021 05:35  Phos  2.7     03-15  Mg     1.7     03-15    TPro  6.7  /  Alb  3.1<L>  /  TBili  0.2  /  DBili  x   /  AST  23  /  ALT  9   /  AlkPhos  73  03-15                RADIOLOGY & ADDITIONAL TESTS:      EXAM:  RAD LUMBAR SPINE AP LAT        PROCEDURE DATE:  Mar 15 2021         INTERPRETATION:  CLINICAL INDICATION: back pain    EXAM:  AP and lateral lumbosacral spine from 3/15/2021 at 1705. No similar prior studies available for comparison.    IMPRESSION:  No compression fractures.    Narrowed posterior L3-L4 disc space with L3 on L4 retrolisthesis however without associated spondylolysis defects. Remaining vertebral body alignment and disc space heights maintained.    Unremarkable SI joints and partially visualized hips.    Generalized osteopenia otherwise no discrete lytic or blastic lesions.    Atherosclerotic abdominal aortic calcifications.    Multiple surgical clips overlie left hemipelvis.        ECHO:    CONCLUSIONS:  1. Mitral annular calcification, otherwise normal mitral  valve. Minimal mitral regurgitation.  2. Normal left ventricular internal dimensions and wall  thicknesses.  3. Endocardium not well visualized; grossly normal left  ventricular systolic function.  4. Mild diastolic dysfunction (Stage I).  5. The right ventricle is not well visualized; grossly  normal right ventricular systolic function.     PROGRESS NOTE:     CONTACT INFO:  Shaista Feliciano MD   PGY-1  Pager: [phone] [facility]    Patient is a 81y old  Female who presents with a chief complaint of syncope (15 Mar 2021 14:25)      SUBJECTIVE / OVERNIGHT EVENTS:  Pt refused labetalol overnight. Patient seen and evaluated at bedside. Pt appears comfortable. Unable to assess systems due to pt's deafness, muteness, inability to use sign language, inability to write.     ADDITIONAL REVIEW OF SYSTEMS:    As above.     MEDICATIONS  (STANDING):  amLODIPine   Tablet 10 milliGRAM(s) Oral daily  enoxaparin Injectable 40 milliGRAM(s) SubCutaneous daily  labetalol 100 milliGRAM(s) Oral every 8 hours  lidocaine   Patch 1 Patch Transdermal daily  polyethylene glycol 3350 17 Gram(s) Oral two times a day  sodium chloride 0.9%. 1000 milliLiter(s) (75 mL/Hr) IV Continuous <Continuous>    MEDICATIONS  (PRN):  acetaminophen   Tablet .. 650 milliGRAM(s) Oral every 6 hours PRN Mild Pain (1 - 3)      CAPILLARY BLOOD GLUCOSE        I&O's Summary      PHYSICAL EXAM:  Vital Signs Last 24 Hrs  T(C): 36.7 (16 Mar 2021 06:01), Max: 36.9 (15 Mar 2021 12:21)  T(F): 98 (16 Mar 2021 06:01), Max: 98.4 (15 Mar 2021 12:21)  HR: 67 (16 Mar 2021 06:01) (67 - 87)  BP: 163/78 (16 Mar 2021 06:01) (140/77 - 198/88)  BP(mean): --  RR: 17 (16 Mar 2021 06:01) (15 - 19)  SpO2: 100% (16 Mar 2021 06:01) (94% - 100%)    Constitutional: NAD, well-developed, well-nourished  Ears, Nose, Mouth, and Throat: normal external ears and nose, +deaf, moist oral mucosa  Eyes: normal conjunctiva, EOMI, PERRL, Ptosis of right eye  Neck: supple, no JVD  Respiratory: Clear to auscultation bilaterally. No wheezes, rales or rhonchi. Normal respiratory effort  Cardiovascular: RRR, no M/R/G, no edema, 2+ Peripheral Pulses  Gastrointestinal: soft, nontender, nondistended, +BS, no hernia  Skin: warm, dry, no rash  Neurologic: difficult to assess due to communication barrier, sensation grossly intact, moving all extremities with no difficulty  Musculoskeletal: no clubbing, no cyanosis, no joint swelling  Psychiatric: alert, unable to obtain orientation, no agitation, anxiety      LABS:  cret                        11.1   5.51  )-----------( 409      ( 16 Mar 2021 09:24 )             35.5     03-16    135  |  102  |  21  ----------------------------<  90  4.2   |  24  |  1.10    Ca    10.3      16 Mar 2021 09:24  Phos  3.1     03-16  Mg     1.7     03-16    TPro  6.7  /  Alb  3.1<L>  /  TBili  0.2  /  DBili  x   /  AST  23  /  ALT  9   /  AlkPhos  73  03-15                RADIOLOGY & ADDITIONAL TESTS:      EXAM:  RAD LUMBAR SPINE AP LAT        PROCEDURE DATE:  Mar 15 2021         INTERPRETATION:  CLINICAL INDICATION: back pain    EXAM:  AP and lateral lumbosacral spine from 3/15/2021 at 1705. No similar prior studies available for comparison.    IMPRESSION:  No compression fractures.    Narrowed posterior L3-L4 disc space with L3 on L4 retrolisthesis however without associated spondylolysis defects. Remaining vertebral body alignment and disc space heights maintained.    Unremarkable SI joints and partially visualized hips.    Generalized osteopenia otherwise no discrete lytic or blastic lesions.    Atherosclerotic abdominal aortic calcifications.    Multiple surgical clips overlie left hemipelvis.        ECHO:    CONCLUSIONS:  1. Mitral annular calcification, otherwise normal mitral  valve. Minimal mitral regurgitation.  2. Normal left ventricular internal dimensions and wall  thicknesses.  3. Endocardium not well visualized; grossly normal left  ventricular systolic function.  4. Mild diastolic dysfunction (Stage I).  5. The right ventricle is not well visualized; grossly  normal right ventricular systolic function.     PROGRESS NOTE:     CONTACT INFO:  Shaista Feliciano MD   PGY-1  Pager: [phone] [facility]    Patient is a 81y old  Female who presents with a chief complaint of syncope (15 Mar 2021 14:25)    SUBJECTIVE / OVERNIGHT EVENTS:  Pt refused labetalol overnight. Patient seen and evaluated at bedside. Pt appears comfortable. Unable to assess systems due to pt's deafness, muteness, inability to use sign language, inability to write.     ADDITIONAL REVIEW OF SYSTEMS:    As above.     MEDICATIONS  (STANDING):  amLODIPine   Tablet 10 milliGRAM(s) Oral daily  enoxaparin Injectable 40 milliGRAM(s) SubCutaneous daily  labetalol 100 milliGRAM(s) Oral every 8 hours  lidocaine   Patch 1 Patch Transdermal daily  polyethylene glycol 3350 17 Gram(s) Oral two times a day  sodium chloride 0.9%. 1000 milliLiter(s) (75 mL/Hr) IV Continuous <Continuous>    MEDICATIONS  (PRN):  acetaminophen   Tablet .. 650 milliGRAM(s) Oral every 6 hours PRN Mild Pain (1 - 3)    PHYSICAL EXAM:  Vital Signs Last 24 Hrs  T(C): 36.7 (16 Mar 2021 06:01), Max: 36.9 (15 Mar 2021 12:21)  T(F): 98 (16 Mar 2021 06:01), Max: 98.4 (15 Mar 2021 12:21)  HR: 67 (16 Mar 2021 06:01) (67 - 87)  BP: 163/78 (16 Mar 2021 06:01) (140/77 - 198/88)  BP(mean): --  RR: 17 (16 Mar 2021 06:01) (15 - 19)  SpO2: 100% (16 Mar 2021 06:01) (94% - 100%)    Constitutional: NAD, well-developed, well-nourished  Ears, Nose, Mouth, and Throat: normal external ears and nose, +deaf, moist oral mucosa  Eyes: normal conjunctiva, EOMI, PERRL, Ptosis of right eye  Neck: supple, no JVD  Respiratory: Clear to auscultation bilaterally. No wheezes, rales or rhonchi. Normal respiratory effort  Cardiovascular: RRR, no M/R/G, no edema, 2+ Peripheral Pulses  Gastrointestinal: soft, nontender, nondistended, +BS, no hernia  Skin: warm, dry, no rash  Neurologic: difficult to assess due to communication barrier, sensation grossly intact, moving all extremities with no difficulty  Musculoskeletal: no clubbing, no cyanosis, no joint swelling  Psychiatric: alert, unable to obtain orientation, no agitation, anxiety      LABS:  cret                        11.1   5.51  )-----------( 409      ( 16 Mar 2021 09:24 )             35.5     03-16    135  |  102  |  21  ----------------------------<  90  4.2   |  24  |  1.10    Ca    10.3      16 Mar 2021 09:24  Phos  3.1     03-16  Mg     1.7     03-16    TPro  6.7  /  Alb  3.1<L>  /  TBili  0.2  /  DBili  x   /  AST  23  /  ALT  9   /  AlkPhos  73  03-15        EXAM:  RAD LUMBAR SPINE AP LAT        PROCEDURE DATE:  Mar 15 2021         INTERPRETATION:  CLINICAL INDICATION: back pain    EXAM:  AP and lateral lumbosacral spine from 3/15/2021 at 1705. No similar prior studies available for comparison.    IMPRESSION:  No compression fractures.    Narrowed posterior L3-L4 disc space with L3 on L4 retrolisthesis however without associated spondylolysis defects. Remaining vertebral body alignment and disc space heights maintained.    Unremarkable SI joints and partially visualized hips.    Generalized osteopenia otherwise no discrete lytic or blastic lesions.    Atherosclerotic abdominal aortic calcifications.    Multiple surgical clips overlie left hemipelvis.        ECHO:    CONCLUSIONS:  1. Mitral annular calcification, otherwise normal mitral  valve. Minimal mitral regurgitation.  2. Normal left ventricular internal dimensions and wall  thicknesses.  3. Endocardium not well visualized; grossly normal left  ventricular systolic function.  4. Mild diastolic dysfunction (Stage I).  5. The right ventricle is not well visualized; grossly  normal right ventricular systolic function.

## 2021-03-17 ENCOUNTER — TRANSCRIPTION ENCOUNTER (OUTPATIENT)
Age: 81
End: 2021-03-17

## 2021-03-17 LAB
COLLECT DURATION TIME UR: 24 HR — SIGNIFICANT CHANGE UP
CORTIS AM PEAK SERPL-MCNC: 2.2 UG/DL — LOW (ref 6–18.4)
TOTAL VOLUME - 24 HOUR: 200 ML — SIGNIFICANT CHANGE UP
URINE CREATININE CALCULATION: 0.2 G/24 H — LOW (ref 0.6–1.6)

## 2021-03-17 PROCEDURE — 99233 SBSQ HOSP IP/OBS HIGH 50: CPT | Mod: GC

## 2021-03-17 PROCEDURE — 99232 SBSQ HOSP IP/OBS MODERATE 35: CPT | Mod: GC

## 2021-03-17 RX ORDER — CARVEDILOL PHOSPHATE 80 MG/1
3.12 CAPSULE, EXTENDED RELEASE ORAL EVERY 12 HOURS
Refills: 0 | Status: DISCONTINUED | OUTPATIENT
Start: 2021-03-17 | End: 2021-03-18

## 2021-03-17 RX ADMIN — Medication 1 MILLIGRAM(S): at 00:14

## 2021-03-17 RX ADMIN — CARVEDILOL PHOSPHATE 3.12 MILLIGRAM(S): 80 CAPSULE, EXTENDED RELEASE ORAL at 17:27

## 2021-03-17 RX ADMIN — POLYETHYLENE GLYCOL 3350 17 GRAM(S): 17 POWDER, FOR SOLUTION ORAL at 17:26

## 2021-03-17 RX ADMIN — SENNA PLUS 2 TABLET(S): 8.6 TABLET ORAL at 21:51

## 2021-03-17 RX ADMIN — LIDOCAINE 1 PATCH: 4 CREAM TOPICAL at 11:55

## 2021-03-17 RX ADMIN — AMLODIPINE BESYLATE 10 MILLIGRAM(S): 2.5 TABLET ORAL at 06:02

## 2021-03-17 RX ADMIN — LIDOCAINE 1 PATCH: 4 CREAM TOPICAL at 19:10

## 2021-03-17 RX ADMIN — ENOXAPARIN SODIUM 40 MILLIGRAM(S): 100 INJECTION SUBCUTANEOUS at 11:56

## 2021-03-17 RX ADMIN — POLYETHYLENE GLYCOL 3350 17 GRAM(S): 17 POWDER, FOR SOLUTION ORAL at 06:02

## 2021-03-17 RX ADMIN — LIDOCAINE 1 PATCH: 4 CREAM TOPICAL at 23:04

## 2021-03-17 NOTE — PROGRESS NOTE ADULT - PROBLEM SELECTOR PLAN 4
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       Angella Pedro MD  Endocrine Fellow  Pager [phone] from 9 am to 5 pm Mon to Fri.  After hours and on weekends please call [phone]

## 2021-03-17 NOTE — PROGRESS NOTE ADULT - PROBLEM SELECTOR PLAN 2
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   - Per neurosurgery, no surgical intervention at this time.  - CTH w contrast showing large mass centered in sella; numerous possible etiologies  - CTA neck wnl  - Endocrine labs obtained: Prolactin elevated; dilutional study suggestive of stalk effect rather than secretion  - AM cortisol, ACTH wnl  - Endocrine suggesting overnight dexamethasone suppression test  - Endo suggesting ophtho eval for VF testing although unclear how pt can perform this; can f/u as outpt   - Received ankle xray to evaluate surgical site retained metal for MRI  - Per son, pt had ankle surgery "decades ago," cannot provide further details  - Neurosurgery suggests outpt f/u to determine eligibility for MRI - CT head non contrast revealing 3.7 x 2 cm left extra-axial mass causing effacement of ventral lower judit and hypodensity in medial left parietal cortex; neurosurgery consulted.   - Per neurosurgery, no surgical intervention at this time.  - CTH w contrast showing large mass centered in sella; numerous possible etiologies  - CTA neck wnl  - Endocrine labs obtained: Prolactin elevated; dilutional study suggestive of stalk effect rather than secretion  - AM cortisol, ACTH wnl  - Endocrine suggesting overnight dexamethasone suppression test  - Endo suggesting ophtho eval for VF testing although unclear how pt can perform this; can f/u as outpt   - Ankle xray showing significant metal hardware in R ankle  - Per son, pt had ankle surgery "decades ago," cannot provide further details  - Neurosurgery suggests outpt f/u to determine eligibility for MRI - CT head non contrast revealing 3.7 x 2 cm left extra-axial mass causing effacement of ventral lower judit and hypodensity in medial left parietal cortex; neurosurgery consulted.   - Per neurosurgery, no surgical intervention at this time.  - CTH w contrast showing large mass centered in sella; numerous possible etiologies  - CTA neck wnl  - Endocrine labs obtained: Prolactin elevated; dilutional study suggestive of stalk effect rather than secretion  - AM cortisol, ACTH wnl  - Dexamethasone test showing suppression  - Endo suggesting ophtho eval for VF testing although unclear how pt can perform this; can f/u as outpt with Dr. Blake  - Will confirm with endo re: endo f/u as outpt  - Ankle xray showing significant metal hardware in R ankle  - Per son, pt had ankle surgery "decades ago," cannot provide further details  - Neurosurgery suggests outpt f/u to determine eligibility for MRI

## 2021-03-17 NOTE — PROGRESS NOTE ADULT - PROBLEM SELECTOR PLAN 1
-pt presenting with history of 2 syncopal episodes in setting of brain mass. DDx include neurologic syncope vs. cardiogenic syncope vs syncope from orthostasis. However, patient does not have a known hx of arrhythmia. Unknown whether patient had presyncopal symptoms. Unlikely to be syncope from orthostastis given hypertension  - Tele showing no events; now D/C  - TTE showing mild diastolic dysfunction  - CTH w contrast showing large mass centered in sella; numerous possible etiologies  - CTA neck wnl  - CT chest, A/P negative   - Neuro following  - Orthostatics positive; s/p gentle hydration 12h  - Lidocaine for back pain  - Xray spine showing no fractures; mild osteopenia Pt presenting with history of 2 syncopal episodes in setting of brain mass. DDx include neurologic syncope vs. cardiogenic syncope vs syncope from orthostasis. However, patient does not have a known hx of arrhythmia. Unknown whether patient had presyncopal symptoms. Unlikely to be syncope from orthostastis given hypertension  - Tele showing no events; now D/C  - TTE showing mild diastolic dysfunction  - CTH w contrast showing large mass centered in sella; numerous possible etiologies  - CTA neck wnl  - CT chest, A/P negative   - Neuro following  - Orthostatics positive; s/p gentle hydration 12h  - Lidocaine for back pain  - Xray spine showing no fractures; mild osteopenia

## 2021-03-17 NOTE — DISCHARGE NOTE PROVIDER - NSDCFUADDAPPT_GEN_ALL_CORE_FT
Please followup to establish care with a new primary care doctor at the internal medicine clinic at [address] [place] [address].

## 2021-03-17 NOTE — DISCHARGE NOTE PROVIDER - NSDCCPCAREPLAN_GEN_ALL_CORE_FT
PRINCIPAL DISCHARGE DIAGNOSIS  Diagnosis: Syncope  Assessment and Plan of Treatment: You came in to the hospital because you lost consciousness. Cardiac testing did not reveal a cardiac cause of your syncope. The syncope may be related to drop in blood pressure when you stand up, or it could be related to the brain mass we fount on CT scan. You should follow up with Dr. Chaney in his office.      SECONDARY DISCHARGE DIAGNOSES  Diagnosis: Brain mass  Assessment and Plan of Treatment: We found a large brain mass on CT scan. We were unable to obtain MRI due to metal in your ankle. This brain mass is likely causing the high levels of prolactin hormone we found in your blood. You should follow up with Dr. Chaney for further management of the brain mass. You should also follow up with an endocrinologist for the hormone abnormalities.    Diagnosis: HTN (hypertension)  Assessment and Plan of Treatment: We found that you have very high blood pressure. We started you on a medication called amlodpine to control your blood pressure. Despite the high pressures, when you stand up your blood pressure drops significantly. This may have contributed to the syncope that brought you to the hospital. You should continue taking the blood pressure medications we have prescribed for you.     PRINCIPAL DISCHARGE DIAGNOSIS  Diagnosis: Syncope  Assessment and Plan of Treatment: You came in to the hospital because you lost consciousness. Cardiac testing did not reveal a cardiac cause of your syncope. The syncope may be related to drop in blood pressure when you stand up, or it could be related to the brain mass we fount on CT scan. You should follow up with Dr. Chaney, the neurosurgeon, in his office as well establish a new primary care doctor which we have provided in the follow-up.      SECONDARY DISCHARGE DIAGNOSES  Diagnosis: Brain mass  Assessment and Plan of Treatment: We found a large brain mass on CT scan. We were unable to obtain MRI due to metal in your ankle. We also found elevated hormone levels of prolactin in your blood. You should follow up with Dr. Chaney for further management of the brain mass. You should also follow up with an endocrinologist for the hormone abnormalities.    Diagnosis: HTN (hypertension)  Assessment and Plan of Treatment: We found that you have very high blood pressure. We started you on a medication called amlodpine to control your blood pressure. Despite the high pressures, when you stand up your blood pressure drops significantly. This may have contributed to the syncope that brought you to the hospital. You should continue taking the blood pressure medications we have prescribed for you.     PRINCIPAL DISCHARGE DIAGNOSIS  Diagnosis: Syncope  Assessment and Plan of Treatment: You came in to the hospital because you lost consciousness. Cardiac testing did not reveal a cardiac cause of your syncope. The syncope may be related to drop in blood pressure when you stand up, or it could be related to the brain mass we fount on CT scan. You should follow up with Dr. Chaney, the neurosurgeon, in his office as well establish a new primary care doctor at [address] [place] [address] which we have provided in the follow-up.      SECONDARY DISCHARGE DIAGNOSES  Diagnosis: Brain mass  Assessment and Plan of Treatment: We found a large brain mass on CT scan. We were unable to obtain MRI due to metal in your ankle. We also found elevated hormone levels of prolactin in your blood. You should follow up with Dr. Chaney for further management of the brain mass. You should also follow up with an endocrinologist for the hormone abnormalities.    Diagnosis: HTN (hypertension)  Assessment and Plan of Treatment: We found that you have very high blood pressure. We started you on a medication called amlodpine to control your blood pressure. Despite the high pressures, when you stand up your blood pressure drops significantly. This may have contributed to the syncope that brought you to the hospital. You should continue taking the blood pressure medications we have prescribed for you and followup with the primary care doctor at [address] [place] [address]

## 2021-03-17 NOTE — PROGRESS NOTE ADULT - ATTENDING COMMENTS
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 AM cortisol normal. Prolactin elevation is likely due to stalk effect and not prolactinoma.   Dex suppression test slightly abnormal, 24 hour urine cortisol testing. Further hypercortisolism testing such as salivary cortisol can be done in the outpatient setting. Follow up with [facility] endocrinology [phone].  Complex patient high level decision making.    Diana Feliz MD  Division of Endocrinology  Pager: [phone]    If after 6PM or before 9AM, or on weekends/holidays, please call endocrine answering service for assistance ([phone]).  For nonurgent matters email [email or web address] for assistance.

## 2021-03-17 NOTE — PROGRESS NOTE ADULT - ASSESSMENT
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

## 2021-03-17 NOTE — PROGRESS NOTE ADULT - ASSESSMENT
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

## 2021-03-17 NOTE — PROGRESS NOTE ADULT - SUBJECTIVE AND OBJECTIVE BOX
PROGRESS NOTE:     CONTACT INFO:  Shaista Feliciano MD   PGY-1  Pager: [phone] [facility]    Patient is a 81y old  Female who presents with a chief complaint of syncope (16 Mar 2021 15:36)      SUBJECTIVE / OVERNIGHT EVENTS:  No acute events overnight. Patient seen and evaluated at bedside. Appears comfortable and gestures indicating this. Unable to assess other systems.    ADDITIONAL REVIEW OF SYSTEMS:    As above.     MEDICATIONS  (STANDING):  amLODIPine   Tablet 10 milliGRAM(s) Oral daily  enoxaparin Injectable 40 milliGRAM(s) SubCutaneous daily  lidocaine   Patch 1 Patch Transdermal daily  polyethylene glycol 3350 17 Gram(s) Oral two times a day  senna 2 Tablet(s) Oral at bedtime    MEDICATIONS  (PRN):  acetaminophen   Tablet .. 650 milliGRAM(s) Oral every 6 hours PRN Mild Pain (1 - 3)  bisacodyl Suppository 10 milliGRAM(s) Rectal daily PRN Constipation  labetalol 100 milliGRAM(s) Oral every 8 hours PRN SBP>180      CAPILLARY BLOOD GLUCOSE        I&O's Summary      PHYSICAL EXAM:  Vital Signs Last 24 Hrs  T(C): 36.7 (17 Mar 2021 05:56), Max: 36.7 (16 Mar 2021 13:11)  T(F): 98 (17 Mar 2021 05:56), Max: 98 (16 Mar 2021 13:11)  HR: 80 (17 Mar 2021 05:56) (70 - 80)  BP: 170/83 (17 Mar 2021 05:56) (108/58 - 170/83)  BP(mean): --  RR: 17 (17 Mar 2021 05:56) (16 - 17)  SpO2: 100% (17 Mar 2021 05:56) (100% - 100%)    Constitutional: NAD, well-developed, well-nourished  Ears, Nose, Mouth, and Throat: normal external ears and nose, +deaf, moist oral mucosa  Eyes: normal conjunctiva, EOMI, PERRL, Ptosis of right eye  Neck: supple, no JVD  Respiratory: Clear to auscultation bilaterally. No wheezes, rales or rhonchi. Normal respiratory effort  Cardiovascular: RRR, no M/R/G, no edema, 2+ Peripheral Pulses  Gastrointestinal: soft, nontender, nondistended, +BS, no hernia  Skin: warm, dry, no rash  Neurologic: difficult to assess due to communication barrier, sensation grossly intact, moving all extremities with no difficulty  Musculoskeletal: no clubbing, no cyanosis, no joint swelling  Psychiatric: alert, unable to obtain orientation, no agitation, anxiety    LABS:                        11.1   5.51  )-----------( 409      ( 16 Mar 2021 09:24 )             35.5     03-16    135  |  102  |  21  ----------------------------<  90  4.2   |  24  |  1.10    Ca    10.3      16 Mar 2021 09:24  Phos  3.1     03-16  Mg     1.7     03-16      Cortisol AM, Serum (03.16.21 @ 09:24)   Cortisol AM, Serum: 13.2 ug/dL       Adrenocorticotropic Hormone, Serum (03.16.21 @ 13:46)   Adrenocorticotropic Hormone, Serum: 23.6: Test Repeated pg/mL       Prolactin Dilution Study (03.16.21 @ 14:17)   Prolactin Undiluted: 75.3 ng/mL   Prolactin Diluted: 70.4 ng/mL               RADIOLOGY & ADDITIONAL TESTS:    EXAM:  CT ANGIO NECK (W)AW IC      EXAM:  CT ANGIO BRAIN (W)AW IC        PROCEDURE DATE:  Mar 16 2021     IMPRESSION: Normal CTA of the head and neck. Large enhancing mass in centered in the sella extending into the suprasellar cistern, cavernous sinuses and prepontine cistern with mild mass effect on the left pontomedullary junction. Differential diagnosis includes meningioma, pituitary adenoma, lymphoma, metastases as well as other etiologies.        EXAM:  CT ABDOMEN AND PELVIS IC      EXAM:  CT CHEST IC        PROCEDURE DATE:  Mar 16 2021       IMPRESSION:  No CT evidence of intrathoracic or intra-abdominal malignancy.       PROGRESS NOTE:     CONTACT INFO:  Shaista Feliciano MD   PGY-1  Pager: [phone] [facility]    Patient is a 81y old  Female who presents with a chief complaint of syncope (16 Mar 2021 15:36)      SUBJECTIVE / OVERNIGHT EVENTS:  No acute events overnight. Patient seen and evaluated at bedside. Appears comfortable and gestures indicating this. Unable to assess other systems.    ADDITIONAL REVIEW OF SYSTEMS:    As above.     MEDICATIONS  (STANDING):  amLODIPine   Tablet 10 milliGRAM(s) Oral daily  enoxaparin Injectable 40 milliGRAM(s) SubCutaneous daily  lidocaine   Patch 1 Patch Transdermal daily  polyethylene glycol 3350 17 Gram(s) Oral two times a day  senna 2 Tablet(s) Oral at bedtime    MEDICATIONS  (PRN):  acetaminophen   Tablet .. 650 milliGRAM(s) Oral every 6 hours PRN Mild Pain (1 - 3)  bisacodyl Suppository 10 milliGRAM(s) Rectal daily PRN Constipation  labetalol 100 milliGRAM(s) Oral every 8 hours PRN SBP>180      CAPILLARY BLOOD GLUCOSE        I&O's Summary      PHYSICAL EXAM:  Vital Signs Last 24 Hrs  T(C): 36.7 (17 Mar 2021 05:56), Max: 36.7 (16 Mar 2021 13:11)  T(F): 98 (17 Mar 2021 05:56), Max: 98 (16 Mar 2021 13:11)  HR: 80 (17 Mar 2021 05:56) (70 - 80)  BP: 170/83 (17 Mar 2021 05:56) (108/58 - 170/83)  BP(mean): --  RR: 17 (17 Mar 2021 05:56) (16 - 17)  SpO2: 100% (17 Mar 2021 05:56) (100% - 100%)    Constitutional: NAD, well-developed, well-nourished  Ears, Nose, Mouth, and Throat: normal external ears and nose, +deaf, moist oral mucosa  Eyes: normal conjunctiva, EOMI, PERRL, Ptosis of right eye  Neck: supple, no JVD  Respiratory: Clear to auscultation bilaterally. No wheezes, rales or rhonchi. Normal respiratory effort  Cardiovascular: RRR, no M/R/G, no edema, 2+ Peripheral Pulses  Gastrointestinal: soft, nontender, nondistended, +BS, no hernia  Skin: warm, dry, no rash  Neurologic: difficult to assess due to communication barrier, sensation grossly intact, moving all extremities with no difficulty  Musculoskeletal: no clubbing, no cyanosis, no joint swelling  Psychiatric: alert, unable to obtain orientation, no agitation, anxiety    LABS:                        11.1   5.51  )-----------( 409      ( 16 Mar 2021 09:24 )             35.5     03-16    135  |  102  |  21  ----------------------------<  90  4.2   |  24  |  1.10    Ca    10.3      16 Mar 2021 09:24  Phos  3.1     03-16  Mg     1.7     03-16      Cortisol AM, Serum (03.16.21 @ 09:24)   Cortisol AM, Serum: 13.2 ug/dL       Adrenocorticotropic Hormone, Serum (03.16.21 @ 13:46)   Adrenocorticotropic Hormone, Serum: 23.6: Test Repeated pg/mL       Prolactin Dilution Study (03.16.21 @ 14:17)   Prolactin Undiluted: 75.3 ng/mL   Prolactin Diluted: 70.4 ng/mL               RADIOLOGY & ADDITIONAL TESTS:    EXAM:  CT ANGIO NECK (W)AW IC      EXAM:  CT ANGIO BRAIN (W)AW IC        PROCEDURE DATE:  Mar 16 2021     IMPRESSION: Normal CTA of the head and neck. Large enhancing mass in centered in the sella extending into the suprasellar cistern, cavernous sinuses and prepontine cistern with mild mass effect on the left pontomedullary junction. Differential diagnosis includes meningioma, pituitary adenoma, lymphoma, metastases as well as other etiologies.        EXAM:  CT ABDOMEN AND PELVIS IC      EXAM:  CT CHEST IC        PROCEDURE DATE:  Mar 16 2021       IMPRESSION:  No CT evidence of intrathoracic or intra-abdominal malignancy.          EXAM:  RAD ANKLE 2 VIEWS BILAT        PROCEDURE DATE:  Mar 16 2021         INTERPRETATION:  CLINICAL INDICATION: evaluation for of metal in ankles prior to needed MRI    EXAM:  Frontal and lateral views of both ankles from 3/16/2021 at 1709. No similar prior studies available for comparison.    IMPRESSION:  Right ankle fracture fixation hardware present consisting of a lateral distal fibular metal plate with fixation screws and interfragmentary screw and 2 obliquely oriented parallel hooked K wires traversing medial malleolus with an attached tension band wiring secured more proximally to a transversely oriented medial approach threaded screw in distal tibial metadiaphyseal region. No additional metallic hardware implants or devices on either side. Underlying anatomic alignment maintained.    No dislocations or acute appearing fractures in imaged regions.    Bilaterally congruent ankle mortises with smooth and intact talar domes.    Preserved remaining visualized midfoot and hindfoot joint spaces and no joint margin erosions.    No calcaneal spurring and unremarkable distal Achilles tendon shadows.    Generalized osteopenia otherwise no discrete lytic or blastic lesions.   PROGRESS NOTE:     CONTACT INFO:  Shaista Feliciano MD   PGY-1  Pager: [phone] [facility]    Patient is a 81y old  Female who presents with a chief complaint of syncope (16 Mar 2021 15:36)    SUBJECTIVE / OVERNIGHT EVENTS:  No acute events overnight. Patient seen and evaluated at bedside. Appears comfortable and gestures indicating this. Unable to assess other systems.    MEDICATIONS  (STANDING):  amLODIPine   Tablet 10 milliGRAM(s) Oral daily  enoxaparin Injectable 40 milliGRAM(s) SubCutaneous daily  lidocaine   Patch 1 Patch Transdermal daily  polyethylene glycol 3350 17 Gram(s) Oral two times a day  senna 2 Tablet(s) Oral at bedtime    MEDICATIONS  (PRN):  acetaminophen   Tablet .. 650 milliGRAM(s) Oral every 6 hours PRN Mild Pain (1 - 3)  bisacodyl Suppository 10 milliGRAM(s) Rectal daily PRN Constipation  labetalol 100 milliGRAM(s) Oral every 8 hours PRN SBP>180    PHYSICAL EXAM:  Vital Signs Last 24 Hrs  T(C): 36.7 (17 Mar 2021 05:56), Max: 36.7 (16 Mar 2021 13:11)  T(F): 98 (17 Mar 2021 05:56), Max: 98 (16 Mar 2021 13:11)  HR: 80 (17 Mar 2021 05:56) (70 - 80)  BP: 170/83 (17 Mar 2021 05:56) (108/58 - 170/83)  BP(mean): --  RR: 17 (17 Mar 2021 05:56) (16 - 17)  SpO2: 100% (17 Mar 2021 05:56) (100% - 100%)    Constitutional: NAD, well-developed, well-nourished  Ears, Nose, Mouth, and Throat: normal external ears and nose, +deaf, moist oral mucosa  Eyes: normal conjunctiva, EOMI, PERRL, Ptosis of right eye  Neck: supple, no JVD  Respiratory: Clear to auscultation bilaterally. No wheezes, rales or rhonchi. Normal respiratory effort  Cardiovascular: RRR, no M/R/G, no edema, 2+ Peripheral Pulses  Gastrointestinal: soft, nontender, nondistended, +BS, no hernia  Skin: warm, dry, no rash  Neurologic: difficult to assess due to communication barrier, sensation grossly intact, moving all extremities with no difficulty  Musculoskeletal: no clubbing, no cyanosis, no joint swelling  Psychiatric: alert, unable to obtain orientation, no agitation, anxiety    LABS:                        11.1   5.51  )-----------( 409      ( 16 Mar 2021 09:24 )             35.5     03-16    135  |  102  |  21  ----------------------------<  90  4.2   |  24  |  1.10    Ca    10.3      16 Mar 2021 09:24  Phos  3.1     03-16  Mg     1.7     03-16    Cortisol AM, Serum (03.16.21 @ 09:24)   Cortisol AM, Serum: 13.2 ug/dL   Adrenocorticotropic Hormone, Serum (03.16.21 @ 13:46)   Adrenocorticotropic Hormone, Serum: 23.6: Test Repeated pg/mL   Prolactin Dilution Study (03.16.21 @ 14:17)   Prolactin Undiluted: 75.3 ng/mL   Prolactin Diluted: 70.4 ng/mL         RADIOLOGY & ADDITIONAL TESTS:    EXAM:  CT ANGIO NECK (W)AW IC      EXAM:  CT ANGIO BRAIN (W)AW IC        PROCEDURE DATE:  Mar 16 2021     IMPRESSION: Normal CTA of the head and neck. Large enhancing mass in centered in the sella extending into the suprasellar cistern, cavernous sinuses and prepontine cistern with mild mass effect on the left pontomedullary junction. Differential diagnosis includes meningioma, pituitary adenoma, lymphoma, metastases as well as other etiologies.        EXAM:  CT ABDOMEN AND PELVIS IC      EXAM:  CT CHEST IC        PROCEDURE DATE:  Mar 16 2021       IMPRESSION:  No CT evidence of intrathoracic or intra-abdominal malignancy.          EXAM:  RAD ANKLE 2 VIEWS BILAT        PROCEDURE DATE:  Mar 16 2021         INTERPRETATION:  CLINICAL INDICATION: evaluation for of metal in ankles prior to needed MRI    EXAM:  Frontal and lateral views of both ankles from 3/16/2021 at 1709. No similar prior studies available for comparison.    IMPRESSION:  Right ankle fracture fixation hardware present consisting of a lateral distal fibular metal plate with fixation screws and interfragmentary screw and 2 obliquely oriented parallel hooked K wires traversing medial malleolus with an attached tension band wiring secured more proximally to a transversely oriented medial approach threaded screw in distal tibial metadiaphyseal region. No additional metallic hardware implants or devices on either side. Underlying anatomic alignment maintained.    No dislocations or acute appearing fractures in imaged regions.    Bilaterally congruent ankle mortises with smooth and intact talar domes.    Preserved remaining visualized midfoot and hindfoot joint spaces and no joint margin erosions.    No calcaneal spurring and unremarkable distal Achilles tendon shadows.    Generalized osteopenia otherwise no discrete lytic or blastic lesions.   PROGRESS NOTE:     CONTACT INFO:  Shaista Feliciano MD   PGY-1  Pager: [phone] [facility]    Patient is a 81y old  Female who presents with a chief complaint of syncope (16 Mar 2021 15:36)    SUBJECTIVE / OVERNIGHT EVENTS:  No acute events overnight. Patient seen and evaluated at bedside. Appears comfortable and gestures indicating this. Unable to assess other systems.    MEDICATIONS  (STANDING):  amLODIPine   Tablet 10 milliGRAM(s) Oral daily  enoxaparin Injectable 40 milliGRAM(s) SubCutaneous daily  lidocaine   Patch 1 Patch Transdermal daily  polyethylene glycol 3350 17 Gram(s) Oral two times a day  senna 2 Tablet(s) Oral at bedtime    MEDICATIONS  (PRN):  acetaminophen   Tablet .. 650 milliGRAM(s) Oral every 6 hours PRN Mild Pain (1 - 3)  bisacodyl Suppository 10 milliGRAM(s) Rectal daily PRN Constipation  labetalol 100 milliGRAM(s) Oral every 8 hours PRN SBP>180    PHYSICAL EXAM:  Vital Signs Last 24 Hrs  T(C): 36.7 (17 Mar 2021 05:56), Max: 36.7 (16 Mar 2021 13:11)  T(F): 98 (17 Mar 2021 05:56), Max: 98 (16 Mar 2021 13:11)  HR: 80 (17 Mar 2021 05:56) (70 - 80)  BP: 170/83 (17 Mar 2021 05:56) (108/58 - 170/83)  BP(mean): --  RR: 17 (17 Mar 2021 05:56) (16 - 17)  SpO2: 100% (17 Mar 2021 05:56) (100% - 100%)    Constitutional: NAD, well-developed, well-nourished  Ears, Nose, Mouth, and Throat: normal external ears and nose, +deaf, moist oral mucosa  Eyes: normal conjunctiva, EOMI, PERRL, Ptosis of right eye  Neck: supple, no JVD  Respiratory: Clear to auscultation bilaterally. No wheezes, rales or rhonchi. Normal respiratory effort  Cardiovascular: RRR, no M/R/G, no edema, 2+ Peripheral Pulses  Gastrointestinal: soft, nontender, nondistended, +BS, no hernia  Skin: warm, dry, no rash  Neurologic: difficult to assess due to communication barrier, sensation grossly intact, moving all extremities with no difficulty  Musculoskeletal: no clubbing, no cyanosis, no joint swelling  Psychiatric: alert, unable to obtain orientation, no agitation, anxiety      LABS:  cret                        11.1   5.51  )-----------( 409      ( 16 Mar 2021 09:24 )             35.5     03-16    135  |  102  |  21  ----------------------------<  90  4.2   |  24  |  1.10    Ca    10.3      16 Mar 2021 09:24  Phos  3.1     03-16  Mg     1.7     03-16            Cortisol AM, Serum (03.16.21 @ 09:24)   Cortisol AM, Serum: 13.2 ug/dL   Adrenocorticotropic Hormone, Serum (03.16.21 @ 13:46)   Adrenocorticotropic Hormone, Serum: 23.6: Test Repeated pg/mL   Prolactin Dilution Study (03.16.21 @ 14:17)   Prolactin Undiluted: 75.3 ng/mL   Prolactin Diluted: 70.4 ng/mL         RADIOLOGY & ADDITIONAL TESTS:    EXAM:  CT ANGIO NECK (W)AW IC      EXAM:  CT ANGIO BRAIN (W)AW IC        PROCEDURE DATE:  Mar 16 2021     IMPRESSION: Normal CTA of the head and neck. Large enhancing mass in centered in the sella extending into the suprasellar cistern, cavernous sinuses and prepontine cistern with mild mass effect on the left pontomedullary junction. Differential diagnosis includes meningioma, pituitary adenoma, lymphoma, metastases as well as other etiologies.        EXAM:  CT ABDOMEN AND PELVIS IC      EXAM:  CT CHEST IC        PROCEDURE DATE:  Mar 16 2021       IMPRESSION:  No CT evidence of intrathoracic or intra-abdominal malignancy.          EXAM:  RAD ANKLE 2 VIEWS BILAT        PROCEDURE DATE:  Mar 16 2021         INTERPRETATION:  CLINICAL INDICATION: evaluation for of metal in ankles prior to needed MRI    EXAM:  Frontal and lateral views of both ankles from 3/16/2021 at 1709. No similar prior studies available for comparison.    IMPRESSION:  Right ankle fracture fixation hardware present consisting of a lateral distal fibular metal plate with fixation screws and interfragmentary screw and 2 obliquely oriented parallel hooked K wires traversing medial malleolus with an attached tension band wiring secured more proximally to a transversely oriented medial approach threaded screw in distal tibial metadiaphyseal region. No additional metallic hardware implants or devices on either side. Underlying anatomic alignment maintained.    No dislocations or acute appearing fractures in imaged regions.    Bilaterally congruent ankle mortises with smooth and intact talar domes.    Preserved remaining visualized midfoot and hindfoot joint spaces and no joint margin erosions.    No calcaneal spurring and unremarkable distal Achilles tendon shadows.    Generalized osteopenia otherwise no discrete lytic or blastic lesions.

## 2021-03-17 NOTE — PROGRESS NOTE ADULT - PROBLEM SELECTOR PLAN 3
History of HTN, unknown home medications  - s/p amlodipine in ED, noted to be HTNsive to SBP 190s; given 5 mg hydralazine, - - Hypertensive episodes overnight 3/14  - Amlodipine increased to 10mg  - labetalol 100mg TID PRN SBP>180 History of HTN, unknown home medications  - s/p amlodipine in ED, noted to be HTNsive to SBP 190s; given 5 mg hydralazine, - - Hypertensive episodes overnight 3/14  - Amlodipine increased to 10mg  - Added Coreg 3.125mg BID  - labetalol 100mg TID PRN SBP>180

## 2021-03-17 NOTE — DISCHARGE NOTE PROVIDER - HOSPITAL COURSE
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 Spoke with son Domingo. Per son, patient was sitting on walker around 3:15 in the kitchen. She was making noise to express that she was dizzy. Her right arm became limp followed by her left arm. Per son, patient lost consciousness in her chair for 1 minute, after which she regained consciousness on her own. One month ago, patient was found on the ground in the kitchen after an apparent fall (unclear whether patient fainted first and then fell, or lost consciousness after slipping). At that time, patient also spontaneously regain consciousness for few seconds. Patient did not recall falling. Patient lives with son and his stepdaughter. Patient takes no medications. Son does not know name of PCP. Per son, he did not seek medical attention for first syncopal episode because patient refused to go to the doctor. She  has had ankle and abdominal surgery in the past requiring metal to be placed in body, but son in unsure of specifics. She has not seen a doctor in 2 years.     In ED, T 96.6  /88 (max 190/79) RR 16 SpO2 94% RA. Given 2.5 mg amlodipine, 500 cc bolus. 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 No surgical intervention at this point per neurosurgery. Pituitary Labs (ACTH, TSH, LH/FSH, Prolactin, GH) ordered.    Hospital course: Telemetry showed no cardiac events. TTE showed mild diastolic dysfunction. CTH w contrast showing large mass centered in sella; numerous possible etiologies. CTA neck wnl. CT chest, A/P negative. MRI could not be obtained due to metal hardware in her ankle of unknown chronicity. Xray spine showed no fractures. Endo was consulted for the brain mass as prolactin was elevated. Dilutional studies were suggested of stalk compression effect rather than secretion. Morning cortisol was wnl. Dexamethasone suppression test was done. Pt should f/u with Dr. Maurer to determine eligibility for MRI and with Dr. Blake for visual field testing. Pt had persistently high blood pressures during admission. She was placed on amlodipine 10mg and labetalol 100mg TID PRN. 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 Spoke with son Domingo. Per son, patient was sitting on walker around 3:15 in the kitchen. She was making noise to express that she was dizzy. Her right arm became limp followed by her left arm. Per son, patient lost consciousness in her chair for 1 minute, after which she regained consciousness on her own. One month ago, patient was found on the ground in the kitchen after an apparent fall (unclear whether patient fainted first and then fell, or lost consciousness after slipping). At that time, patient also spontaneously regain consciousness for few seconds. Patient did not recall falling. Patient lives with son and his stepdaughter. Patient takes no medications. Son does not know name of PCP. Per son, he did not seek medical attention for first syncopal episode because patient refused to go to the doctor. She  has had ankle and abdominal surgery in the past requiring metal to be placed in body, but son in unsure of specifics. She has not seen a doctor in 2 years.     In ED, T 96.6  /88 (max 190/79) RR 16 SpO2 94% RA. Given 2.5 mg amlodipine, 500 cc bolus. 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 No surgical intervention at this point per neurosurgery. Pituitary Labs (ACTH, TSH, LH/FSH, Prolactin, GH) ordered.    Hospital course: Telemetry showed no cardiac events. TTE showed mild diastolic dysfunction. CTH w contrast showing large mass centered in sella; numerous possible etiologies. CTA neck wnl. CT chest, A/P negative. MRI could not be obtained due to metal hardware in her ankle of unknown chronicity. Xray spine showed no fractures. Endo was consulted for the brain mass as prolactin was elevated. Dilutional studies were suggested of stalk compression effect rather than secretion. Morning cortisol was wnl. Dexamethasone suppression test was done. Pt should f/u with Dr. Maurer to determine eligibility for MRI and with Dr. Blake for visual field testing. Pt had persistently high blood pressures during admission. She was placed on amlodipine 10mg and coreg 3.125 mg BID.    Dispo: home  PT, home PT, rolling walker

## 2021-03-17 NOTE — PROGRESS NOTE ADULT - ATTENDING COMMENTS
Patient seen and examined, care d/w [facility] residents.    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    CTH: a large extra-axial high attenuated mass that appears to arise from the sella with extension into the prepontine cistern (left greater than right). This mass measures 3.2 x 1.2 cm and causes effacement of the ventral lower judit. This mass does appear to involve the sella region cannot be  from the pituitary gland. 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--if this encases or blocks the carotids, then can lead to syncope and stroke; for CTA wnl  - per neuro no futher w/u     # Brain Mass: noted on CTH, unclear if pituitary macroadenoma vs meningioma   - elevated prolactin & TSH <0.1 however with normal free t4, ?subclinical hyperthyroid vs central underproduction  - endo consulted 3/15, appreciate recs, labs as rec ordered   - NSY and neuro requesting MRI however pt with ankle surgery unclear date/location and if MRI compatible--R ankle with hardware pt reported 2019 however son states this is not correct and was decades ago  - CTAP to r/o metastatic disease was negative  - NSY rec OP follow-up as no plan for surgery IP    # HTN  - c/w amlodipine, add coreg  - OP f/u    # Back pain: LBP film no fx   - lidocaine and Tylenol prn     # CKD3  - monitor, renally dose meds     Lovenox ppx, home PT, await reassessment   Dispo home with OP NSY, neuro and endo f/u  Dispo planning, dispo time 32 min

## 2021-03-17 NOTE — DISCHARGE NOTE PROVIDER - CARE PROVIDER_API CALL
Eneida Chaney)  [facility] Neurosurgery  General  [address], [address]  [place] [address]  Phone: [phone]  Fax: [phone]  Follow Up Time:     Osito Blake)  Ophthalmology  [address], [address]  [place] [address]  Phone: [phone]  Fax: [phone]  Follow Up Time:    Eneida Chaney)  [facility] Neurosurgery  General  [address], [address]  [place] [address]  Phone: [phone]  Fax: [phone]  Follow Up Time:     Osito Blake)  Ophthalmology  [address], [address]  [place] [address]  Phone: [phone]  Fax: [phone]  Follow Up Time:     Ronnie Hill)  EndocrinologyMetabDiabetes; Internal Medicine  [address] [place] [address]  Phone: [phone]  Fax: [phone]  Follow Up Time:    Eneida Chaney)  [facility] Neurosurgery  General  [address], [address]  [place] [address]  Phone: [phone]  Fax: [phone]  Follow Up Time:     Osito Blake)  Ophthalmology  [address], [address]  [place] [address]  Phone: [phone]  Fax: [phone]  Follow Up Time:     Ronnie Hill)  EndocrinologyMetabDiabetes; Internal Medicine  [address] [place] [address]  Phone: [phone]  Fax: [phone]  Follow Up Time:     Marleny Hartman)  Internal Medicine  [address], [address]  [place] [address]  Phone: [phone]  Fax: [phone]  Follow Up Time:    Eneida Chaney)  [facility] Neurosurgery  General  [address], [address]  [place] [address]  Phone: [phone]  Fax: [phone]  Follow Up Time: 1 week    Osito Blake)  Ophthalmology  [address], [address]  [place] [address]  Phone: [phone]  Fax: [phone]  Follow Up Time: 1 week    Ronnie Hill)  EndocrinologyMetabDiabetes; Internal Medicine  [address] [place] [address]  Phone: [phone]  Fax: [phone]  Follow Up Time: 1 week    Marleny Hartman)  Internal Medicine  [address], [address]  [place] [address]  Phone: [phone]  Fax: [phone]  Follow Up Time: 2 weeks

## 2021-03-17 NOTE — PROGRESS NOTE ADULT - PROBLEM SELECTOR PLAN 2
- Prolactin level is elevated to 113 but dilutional study done and noted with diluted prolactin 70.4, undiluted 75.3; thus likely suggesting elevation of prolactin is due to stalk effect and not from prolactin secreting adenoma.

## 2021-03-17 NOTE — DISCHARGE NOTE PROVIDER - CARE PROVIDERS DIRECT ADDRESSES
,[email or web address] ,[email or web address]

## 2021-03-17 NOTE — DISCHARGE NOTE PROVIDER - NSDCCPTREATMENT_GEN_ALL_CORE_FT
PRINCIPAL PROCEDURE  Procedure: CT head w con  Findings and Treatment: IMPRESSION: Normal CTA of the head and neck. Large enhancing mass in centered in the sella extending into the suprasellar cistern, cavernous sinuses and prepontine cistern with mild mass effect on the left pontomedullary junction. Differential diagnosis includes meningioma, pituitary adenoma, lymphoma, metastases as well as other etiologies.        SECONDARY PROCEDURE  Procedure: Transthoracic echo  Findings and Treatment: CONCLUSIONS:  1. Mitral annular calcification, otherwise normal mitral  valve. Minimal mitral regurgitation.  2. Normal left ventricular internal dimensions and wall  thicknesses.  3. Endocardium not well visualized; grossly normal left  ventricular systolic function.  4. Mild diastolic dysfunction (Stage I).  5. The right ventricle is not well visualized; grossly  normal right ventricular systolic function.

## 2021-03-17 NOTE — PROGRESS NOTE ADULT - SUBJECTIVE AND OBJECTIVE BOX
Follow-up on: Concern for pituitary adenoma    Interval events: s/p low dose dexamethasone suppression test, AM cortisol resulted slightly high 2.2.      MEDICATIONS  (STANDING):  amLODIPine   Tablet 10 milliGRAM(s) Oral daily  carvedilol 3.125 milliGRAM(s) Oral every 12 hours  enoxaparin Injectable 40 milliGRAM(s) SubCutaneous daily  lidocaine   Patch 1 Patch Transdermal daily  polyethylene glycol 3350 17 Gram(s) Oral two times a day  senna 2 Tablet(s) Oral at bedtime    MEDICATIONS  (PRN):  acetaminophen   Tablet .. 650 milliGRAM(s) Oral every 6 hours PRN Mild Pain (1 - 3)  bisacodyl Suppository 10 milliGRAM(s) Rectal daily PRN Constipation      PHYSICAL EXAM:  VITALS: T(C): 36.6 (03-17-21 @ 15:35)  T(F): 97.8 (03-17-21 @ 15:35), Max: 98 (03-17-21 @ 05:56)  HR: 83 (03-17-21 @ 15:35) (80 - 83)  BP: 150/64 (03-17-21 @ 15:35) (150/64 - 170/83)  RR:  (17 - 18)  SpO2:  (100% - 100%)  Wt(kg): --  GENERAL: NAD, well-groomed  EYES: right eye blindness, no proptosis or injection of left eye  HEENT:  mute and deaf  RESPIRATORY: saturating well on room air, not in any acute respiratory distress  GI: Soft, nontender, non distended      03-16    135  |  102  |  21  ----------------------------<  90  4.2   |  24  |  1.10    EGFR if : 54<L>  EGFR if non : 46<L>    Ca    10.3      03-16  Mg     1.7     03-16  Phos  3.1     03-16    TPro  6.7  /  Alb  3.1<L>  /  TBili  0.2  /  DBili  x   /  AST  23  /  ALT  9   /  AlkPhos  73  03-15      Thyroid Function Tests:  03-14 @ 12:28 FreeT4 1.2 T3 99   03-13 @ 21:51 TSH <0.10 FreeT4 1.3 T3 108    Cortisol AM, Serum: 2.2 ug/dL (03.17.21 @ 08:18)  s/p low dose dexamethasone suppression test    Cortisol AM, Serum: 13.2 ug/dL (03.16.21 @ 09:24)    Adrenocorticotropic Hormone, Serum: 23.6: Test Repeated pg/mL (03.16.21 @ 13:46)    Prolactin Dilution Study (03.16.21 @ 14:17)    Prolactin Undiluted: 75.3 ng/mL    Prolactin Diluted: 70.4 ng/mL    Prolactin, Serum: 113.0 ng/mL (03.14.21 @ 11:36)    Insulin-Like Growth Factor 1: 35 ng/mL (03.14.21 @ 11:16)    Luteinizing Hormone, Serum: <0.3 IU/L (03.14.21 @ 11:36)    Follicle Stimulating Hormone, Serum: 1.5 IU/L (03.14.21 @ 11:36)      < from: CT Angio Head w/ IV Cont (03.16.21 @ 12:32) >  Large enhancing mass in centered in the sella extending into the suprasellar cistern, cavernous sinuses and prepontine cistern with mild mass effect on the left pontomedullary junction. Differential diagnosis includes meningioma, pituitary adenoma, lymphoma, metastases as well as other etiologies.    < end of copied text >

## 2021-03-17 NOTE — DISCHARGE NOTE PROVIDER - PROVIDER TOKENS
PROVIDER:[TOKEN:[[id number]]],PROVIDER:[TOKEN:[[id number]]] PROVIDER:[TOKEN:[[id number]]],PROVIDER:[TOKEN:[[id number]]],PROVIDER:[TOKEN:[[id number]]] PROVIDER:[TOKEN:[[id number]]],PROVIDER:[TOKEN:[[id number]]],PROVIDER:[TOKEN:[[id number]]],PROVIDER:[TOKEN:[[id number]]] PROVIDER:[TOKEN:[[id number]],FOLLOWUP:[1 week]],PROVIDER:[TOKEN:[[id number]],FOLLOWUP:[1 week]],PROVIDER:[TOKEN:[[id number]],FOLLOWUP:[1 week]],PROVIDER:[TOKEN:[[id number]],FOLLOWUP:[2 weeks]]

## 2021-03-17 NOTE — PROGRESS NOTE ADULT - PROBLEM SELECTOR PLAN 1
CT head followed by CTA head showed 3.7 x 2 cm sellar mass extending into the suprasellar cistern, cavernous sinuses and prepontine cistern with mild mass effect on the left pontomedullary junction. Differential diagnosis includes meningioma, pituitary adenoma, lymphoma, metastases as well as other etiologies. Neurosurgery evaluated and recommended outpatient follow up.   Pituitary panel reviewed and recommend as follows:  - Prolactin level is elevated to 113 but dilutional study done and noted with diluted prolactin 70.4, undiluted 75.3; thus likely suggesting elevation of prolactin is due to stalk effect and not from prolactin secreting adenoma.   - TSH is low <0.10 but FT4 and TT3 are low normal thus indicating adequate compensation so does not need thyroid hormone replacement at this time  - IGF-1 level low normal 35 thus no concern for GH over secretion  - AM Cortisol 13.2 (wnl). ACTH 23 (wnl). 24 hr urine cortisol collection completed (testing). s/p low dose dexamethasone suppression test, am cortisol slightly elevated 2.2. Recommend outpatient follow up.  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  - Opthalmology evaluation recommended for visual field testing.    If patient had Medicare , she can follow up with [facility] (office staff emailed to confirm insurance coverage, awaiting response to determine hospital follow up appt). Phone: [phone].  If she has medicaid she can follow up at [facility] endocrine clinic at "[facility] [address] [place] [address]. Imucr-[phone].   Plan discussed with primary team.

## 2021-03-17 NOTE — DISCHARGE NOTE PROVIDER - NPI NUMBER (FOR SYSADMIN USE ONLY) :
[[id number]],[[id number]] [[id number]],[[id number]],[[id number]] [[id number]],[[id number]],[[id number]],[[id number]]

## 2021-03-18 ENCOUNTER — TRANSCRIPTION ENCOUNTER (OUTPATIENT)
Age: 81
End: 2021-03-18

## 2021-03-18 VITALS
DIASTOLIC BLOOD PRESSURE: 79 MMHG | RESPIRATION RATE: 18 BRPM | HEART RATE: 62 BPM | TEMPERATURE: 98 F | SYSTOLIC BLOOD PRESSURE: 141 MMHG | OXYGEN SATURATION: 100 %

## 2021-03-18 PROBLEM — I10 ESSENTIAL (PRIMARY) HYPERTENSION: Chronic | Status: ACTIVE | Noted: 2021-03-13

## 2021-03-18 PROBLEM — R47.01 APHASIA: Chronic | Status: ACTIVE | Noted: 2021-03-13

## 2021-03-18 PROBLEM — H91.90 UNSPECIFIED HEARING LOSS, UNSPECIFIED EAR: Chronic | Status: ACTIVE | Noted: 2021-03-13

## 2021-03-18 LAB — ACTH SER-ACNC: <1.5 PG/ML — LOW (ref 7.2–63.3)

## 2021-03-18 PROCEDURE — 99239 HOSP IP/OBS DSCHRG MGMT >30: CPT

## 2021-03-18 RX ORDER — CARVEDILOL PHOSPHATE 80 MG/1
1 CAPSULE, EXTENDED RELEASE ORAL
Qty: 60 | Refills: 0
Start: 2021-03-18 | End: 2021-04-16

## 2021-03-18 RX ORDER — AMLODIPINE BESYLATE 2.5 MG/1
1 TABLET ORAL
Qty: 30 | Refills: 0
Start: 2021-03-18 | End: 2021-04-16

## 2021-03-18 RX ADMIN — LIDOCAINE 1 PATCH: 4 CREAM TOPICAL at 11:36

## 2021-03-18 RX ADMIN — POLYETHYLENE GLYCOL 3350 17 GRAM(S): 17 POWDER, FOR SOLUTION ORAL at 06:33

## 2021-03-18 RX ADMIN — CARVEDILOL PHOSPHATE 3.12 MILLIGRAM(S): 80 CAPSULE, EXTENDED RELEASE ORAL at 06:32

## 2021-03-18 RX ADMIN — AMLODIPINE BESYLATE 10 MILLIGRAM(S): 2.5 TABLET ORAL at 06:32

## 2021-03-18 RX ADMIN — ENOXAPARIN SODIUM 40 MILLIGRAM(S): 100 INJECTION SUBCUTANEOUS at 11:37

## 2021-03-18 NOTE — PROGRESS NOTE ADULT - PROBLEM SELECTOR PLAN 2
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   - Per neurosurgery, no surgical intervention at this time.  - CTH w contrast showing large mass centered in sella; numerous possible etiologies  - CTA neck wnl  - Endocrine labs obtained: Prolactin elevated; dilutional study suggestive of stalk effect rather than secretion  - AM cortisol, ACTH wnl  - Dexamethasone test showing suppression  - Endo suggesting ophtho eval for VF testing although unclear how pt can perform this; can f/u as outpt with Dr. Blake  - Will confirm with endo re: endo f/u as outpt  - Ankle xray showing significant metal hardware in R ankle  - Per son, pt had ankle surgery "decades ago," cannot provide further details  - Neurosurgery suggests outpt f/u with Dr. Chaney to determine eligibility for MRI - CT head non contrast revealing 3.7 x 2 cm left extra-axial mass causing effacement of ventral lower ujdit and hypodensity in medial left parietal cortex; neurosurgery consulted.   - Per neurosurgery, no surgical intervention at this time.  - CTH w contrast showing large mass centered in sella; numerous possible etiologies  - CTA neck wnl  - Endocrine labs obtained: Prolactin elevated; dilutional study suggestive of stalk effect rather than secretion  - AM cortisol, ACTH wnl  - Dexamethasone test showing suppression  - Endo suggesting ophtho eval for VF testing although unclear how pt can perform this; can f/u as outpt with Dr. Blake  - Can f/u with Dr. Hill for endo  - Ankle xray showing significant metal hardware in R ankle  - Per son, pt had ankle surgery "decades ago," cannot provide further details  - Neurosurgery suggests outpt f/u with Dr. Chaney to determine eligibility for MRI

## 2021-03-18 NOTE — PROGRESS NOTE ADULT - ASSESSMENT
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

## 2021-03-18 NOTE — PROGRESS NOTE ADULT - PROVIDER SPECIALTY LIST ADULT
Internal Medicine
Neurology
Internal Medicine
Internal Medicine
Endocrinology
Endocrinology
Internal Medicine
Internal Medicine

## 2021-03-18 NOTE — PROGRESS NOTE ADULT - PROBLEM SELECTOR PLAN 1
Pt presenting with history of 2 syncopal episodes in setting of brain mass. DDx include neurologic syncope vs. cardiogenic syncope vs syncope from orthostasis. However, patient does not have a known hx of arrhythmia. Unknown whether patient had presyncopal symptoms. Unlikely to be syncope from orthostastis given hypertension  - Tele showing no events; now D/C  - TTE showing mild diastolic dysfunction  - CTH w contrast showing large mass centered in sella; numerous possible etiologies  - CTA neck wnl  - CT chest, A/P negative   - Neuro following  - Orthostatics positive; s/p gentle hydration 12h  - Lidocaine for back pain  - Xray spine showing no fractures; mild osteopenia

## 2021-03-18 NOTE — PROGRESS NOTE ADULT - ATTENDING COMMENTS
Patient seen and examined, care d/w HS4 residents.    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    CTH: a large extra-axial high attenuated mass that appears to arise from the sella with extension into the prepontine cistern (left greater than right). This mass measures 3.2 x 1.2 cm and causes effacement of the ventral lower judit. This mass does appear to involve the sella region cannot be  from the pituitary gland. 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 though the possibility of a meningioma metastasis or other neoplastic lesions cannot because excluded    # Syncope:  no further events  - no tele events, no off tele  - TTE (3/15) with normal EF  - per neuro: concern for sphenoid wing meningioma--if this encases or blocks the carotids, then can lead to syncope and stroke; for CTA wnl  - per neuro no further w/u     # Brain Mass: noted on CTH, unclear if pituitary macroadenoma vs meningioma   - elevated prolactin & TSH <0.1 however with normal free t4, ?subclinical hyperthyroid vs central underproduction  - endo consulted 3/15, appreciate recs, ceferino stalk effect, OP f/u   - NSY and neuro requesting MRI however pt with ankle surgery unclear date/location and if MRI compatible--R ankle with hardware pt reported 2019 however son states this is not correct and was decades ago  - CTAP to r/o metastatic disease was negative  - NSY rec OP follow-up as no plan for surgery IP    # HTN  - c/w amlodipine, add coreg  - OP f/u    # Back pain: LBP film no fx   - lidocaine and Tylenol prn     # CKD3  - monitor, renally dose meds     Lovenox ppx  Dispo home with OP NSY, neuro and endo f/u  Dispo planning, dispo time 32 min Patient seen and examined, care d/w HS4 residents.    In summary 82 yo F with congenital deafness/muteness, HTN presented from home with syncope noted to be hypertensive and CTH with mass.    CTH: a large extra-axial high attenuated mass that appears to arise from the sella with extension into the prepontine cistern (left greater than right). This mass measures 3.2 x 1.2 cm and causes effacement of the ventral lower judit. This mass does appear to involve the sella region cannot be  from the pituitary gland. 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 though the possibility of a meningioma metastasis or other neoplastic lesions cannot because excluded    # Syncope:  no further events  - no tele events, no off tele  - TTE (3/15) with normal EF  - per neuro: concern for sphenoid wing meningioma--if this encases or blocks the carotids, then can lead to syncope and stroke; CTA wnl  - per neuro no further w/u     # Brain Mass: noted on CTH, unclear if pituitary macroadenoma vs meningioma   - elevated prolactin & TSH <0.1 however with normal free t4, ?subclinical hyperthyroid vs central underproduction  - endo consulted 3/15, appreciate recs, ceferino stalk effect, OP f/u   - NSY and neuro requesting MRI however pt with ankle surgery unclear date/location and if MRI compatible--R ankle with hardware pt reported 2019 however son states this is not correct and was decades ago  - CTAP to r/o metastatic disease was negative  - NSY rec OP follow-up as no plan for surgery IP    # HTN  - c/w amlodipine, add coreg, needs OP PCP    # Back pain: LBP film no fx   - lidocaine and Tylenol prn     # CKD3  - monitor, renally dose meds     Lovenox ppx  Dispo home with OP NSY, neuro and endo f/u  Dispo planning, dispo time 32 min Patient seen and examined, care d/w HS4 residents.    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    CTH: a large extra-axial high attenuated mass that appears to arise from the sella with extension into the prepontine cistern (left greater than right). This mass measures 3.2 x 1.2 cm and causes effacement of the ventral lower judit. This mass does appear to involve the sella region cannot be  from the pituitary gland. 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 though the possibility of a meningioma metastasis or other neoplastic lesions cannot because excluded    # Syncope:  no further events  - no tele events, no off tele  - TTE (3/15) with normal EF  - per neuro: concern for sphenoid wing meningioma--if this encases or blocks the carotids, then can lead to syncope and stroke; CTA wnl  - per neuro no further w/u     # Brain Mass: noted on CTH, unclear if pituitary macroadenoma vs meningioma   - elevated prolactin & TSH <0.1 however with normal free t4, ?subclinical hyperthyroid vs central underproduction  - endo consulted 3/15, appreciate recs, ceferino stalk effect, OP f/u   - NSY and neuro requesting MRI however pt with ankle surgery unclear date/location and if MRI compatible--R ankle with hardware pt reported 2019 however son states this is not correct and was decades ago  - CTAP to r/o metastatic disease was negative  - NSY rec OP follow-up as no plan for surgery IP    # HTN  - c/w amlodipine, add coreg, needs OP PCP    # Back pain: LBP film no fx   - lidocaine and Tylenol prn     # CKD3  - monitor, renally dose meds     Lovenox ppx  Dispo home with OP NSY, neuro and endo f/u  Son declined COVID vaccine   Dispo planning, dispo time 32 min

## 2021-03-18 NOTE — PROGRESS NOTE ADULT - PROBLEM SELECTOR PLAN 3
History of HTN, unknown home medications  - Now under better control  - s/p amlodipine in ED, noted to be HTNsive to SBP 190s; given 5 mg hydralazine, - - Hypertensive episodes overnight 3/14  - Amlodipine increased to 10mg  - Added Coreg 3.125mg BID  - labetalol 100mg TID PRN SBP>180 History of HTN, unknown home medications  - Now under better control  - s/p amlodipine in ED, noted to be HTNsive to SBP 190s; given 5 mg hydralazine, - - Hypertensive episodes overnight 3/14  - Amlodipine increased to 10mg  - Added Coreg 3.125mg BID  - labetalol 100mg TID PRN SBP>180  - Should follow up in resident clinic for BP management

## 2021-03-18 NOTE — PROGRESS NOTE ADULT - PROBLEM SELECTOR PROBLEM 3
Hypertension
Low thyroid stimulating hormone (TSH) level
Low thyroid stimulating hormone (TSH) level
Hypertension
Hypertension

## 2021-03-18 NOTE — PROGRESS NOTE ADULT - SUBJECTIVE AND OBJECTIVE BOX
PROGRESS NOTE:     CONTACT INFO:  Shaista Feliciano MD   PGY-1  Pager: [phone] [facility]    Patient is a 81y old  Female who presents with a chief complaint of syncope (17 Mar 2021 16:13)      SUBJECTIVE / OVERNIGHT EVENTS:  No acute events overnight. Patient seen and evaluated at bedside. No fever. Appears comfortable. Unable to assess systems.    ADDITIONAL REVIEW OF SYSTEMS:    As above.     MEDICATIONS  (STANDING):  amLODIPine   Tablet 10 milliGRAM(s) Oral daily  carvedilol 3.125 milliGRAM(s) Oral every 12 hours  enoxaparin Injectable 40 milliGRAM(s) SubCutaneous daily  lidocaine   Patch 1 Patch Transdermal daily  polyethylene glycol 3350 17 Gram(s) Oral two times a day  senna 2 Tablet(s) Oral at bedtime    MEDICATIONS  (PRN):  acetaminophen   Tablet .. 650 milliGRAM(s) Oral every 6 hours PRN Mild Pain (1 - 3)  bisacodyl Suppository 10 milliGRAM(s) Rectal daily PRN Constipation      CAPILLARY BLOOD GLUCOSE        I&O's Summary      PHYSICAL EXAM:  Vital Signs Last 24 Hrs  T(C): 36.7 (18 Mar 2021 06:31), Max: 37 (17 Mar 2021 21:48)  T(F): 98 (18 Mar 2021 06:31), Max: 98.6 (17 Mar 2021 21:48)  HR: 60 (18 Mar 2021 06:31) (60 - 84)  BP: 167/81 (18 Mar 2021 06:31) (139/63 - 167/81)  BP(mean): 130 (18 Mar 2021 06:31) (91 - 130)  RR: 18 (18 Mar 2021 06:31) (17 - 18)  SpO2: 100% (18 Mar 2021 06:31) (99% - 100%)    Constitutional: NAD, well-developed, well-nourished  Ears, Nose, Mouth, and Throat: normal external ears and nose, +deaf, moist oral mucosa  Eyes: normal conjunctiva, EOMI, PERRL, Ptosis of right eye  Neck: supple, no JVD  Respiratory: Clear to auscultation bilaterally. No wheezes, rales or rhonchi. Normal respiratory effort  Cardiovascular: RRR, no M/R/G, no edema, 2+ Peripheral Pulses  Gastrointestinal: soft, nontender, nondistended, +BS, no hernia  Skin: warm, dry, no rash  Neurologic: difficult to assess due to communication barrier, sensation grossly intact, moving all extremities with no difficulty  Musculoskeletal: no clubbing, no cyanosis, no joint swelling  Psychiatric: alert, unable to obtain orientation, no agitation, anxiety    LABS:                        11.1   5.51  )-----------( 409      ( 16 Mar 2021 09:24 )             35.5     03-16    135  |  102  |  21  ----------------------------<  90  4.2   |  24  |  1.10    Ca    10.3      16 Mar 2021 09:24  Phos  3.1     03-16  Mg     1.7     03-16                  RADIOLOGY & ADDITIONAL TESTS:  Results Reviewed   PROGRESS NOTE:     CONTACT INFO:  Shaista Feliciano MD   PGY-1  Pager: [phone] [facility]    Patient is a 81y old  Female who presents with a chief complaint of syncope (17 Mar 2021 16:13)    SUBJECTIVE / OVERNIGHT EVENTS:  No acute events overnight. Patient seen and evaluated at bedside. No fever. Appears comfortable. Unable to assess systems.    ADDITIONAL REVIEW OF SYSTEMS: unable to assess 2/2 nonverbal    MEDICATIONS  (STANDING):  amLODIPine   Tablet 10 milliGRAM(s) Oral daily  carvedilol 3.125 milliGRAM(s) Oral every 12 hours  enoxaparin Injectable 40 milliGRAM(s) SubCutaneous daily  lidocaine   Patch 1 Patch Transdermal daily  polyethylene glycol 3350 17 Gram(s) Oral two times a day  senna 2 Tablet(s) Oral at bedtime    MEDICATIONS  (PRN):  acetaminophen   Tablet .. 650 milliGRAM(s) Oral every 6 hours PRN Mild Pain (1 - 3)  bisacodyl Suppository 10 milliGRAM(s) Rectal daily PRN Constipation    PHYSICAL EXAM:  Vital Signs Last 24 Hrs  T(C): 36.7 (18 Mar 2021 06:31), Max: 37 (17 Mar 2021 21:48)  T(F): 98 (18 Mar 2021 06:31), Max: 98.6 (17 Mar 2021 21:48)  HR: 60 (18 Mar 2021 06:31) (60 - 84)  BP: 167/81 (18 Mar 2021 06:31) (139/63 - 167/81)  BP(mean): 130 (18 Mar 2021 06:31) (91 - 130)  RR: 18 (18 Mar 2021 06:31) (17 - 18)  SpO2: 100% (18 Mar 2021 06:31) (99% - 100%)    Constitutional: NAD, well-developed, well-nourished  Ears, Nose, Mouth, and Throat: normal external ears and nose, +deaf, moist oral mucosa  Eyes: normal conjunctiva, EOMI, PERRL, Ptosis of right eye  Neck: supple, no JVD  Respiratory: Clear to auscultation bilaterally. No wheezes, rales or rhonchi. Normal respiratory effort  Cardiovascular: RRR, no M/R/G, no edema, 2+ Peripheral Pulses  Gastrointestinal: soft, nontender, nondistended, +BS, no hernia  Skin: warm, dry, no rash  Neurologic: difficult to assess due to communication barrier, sensation grossly intact, moving all extremities with no difficulty  Musculoskeletal: no clubbing, no cyanosis, no joint swelling  Psychiatric: alert, unable to obtain orientation, no agitation, anxiety    LABS:                        11.1   5.51  )-----------( 409      ( 16 Mar 2021 09:24 )             35.5     03-16    135  |  102  |  21  ----------------------------<  90  4.2   |  24  |  1.10    Ca    10.3      16 Mar 2021 09:24  Phos  3.1     03-16  Mg     1.7     03-16

## 2021-03-18 NOTE — PROGRESS NOTE ADULT - PROBLEM SELECTOR PROBLEM 1
Syncope, unspecified syncope type
Syncope, unspecified syncope type
Pituitary macroadenoma
Pituitary macroadenoma
Syncope, unspecified syncope type

## 2021-03-18 NOTE — PROGRESS NOTE ADULT - PROBLEM SELECTOR PROBLEM 2
Brain mass
Elevated prolactin level
Brain mass
Brain mass
Elevated prolactin level
Brain mass
Brain mass

## 2021-03-18 NOTE — DISCHARGE NOTE NURSING/CASE MANAGEMENT/SOCIAL WORK - NSDCFUADDAPPT_GEN_ALL_CORE_FT
Please followup to establish care with a new primary care doctor at the internal medicine clinic at [address] [place] [address].

## 2021-03-18 NOTE — DISCHARGE NOTE NURSING/CASE MANAGEMENT/SOCIAL WORK - PATIENT PORTAL LINK FT
You can access the FollowMyHealth Patient Portal offered by [facility] by registering at the following website: [email or web address]. By joining [facility]’s FollowMyHealth portal, you will also be able to view your health information using other applications (apps) compatible with our system.

## 2021-03-24 ENCOUNTER — APPOINTMENT (OUTPATIENT)
Dept: ENDOCRINOLOGY | Facility: CLINIC | Age: 81
End: 2021-03-24
Payer: MEDICARE

## 2021-03-24 VITALS
HEART RATE: 74 BPM | TEMPERATURE: 98.4 F | OXYGEN SATURATION: 99 % | SYSTOLIC BLOOD PRESSURE: 110 MMHG | DIASTOLIC BLOOD PRESSURE: 66 MMHG | HEIGHT: 63.39 IN | WEIGHT: 120.35 LBS | BODY MASS INDEX: 21.06 KG/M2

## 2021-03-24 DIAGNOSIS — Z84.89 FAMILY HISTORY OF OTHER SPECIFIED CONDITIONS: ICD-10-CM

## 2021-03-24 DIAGNOSIS — Z83.79 FAMILY HISTORY OF OTHER DISEASES OF THE DIGESTIVE SYSTEM: ICD-10-CM

## 2021-03-24 DIAGNOSIS — Z87.81 PERSONAL HISTORY OF (HEALED) TRAUMATIC FRACTURE: ICD-10-CM

## 2021-03-24 DIAGNOSIS — D35.2 BENIGN NEOPLASM OF PITUITARY GLAND: ICD-10-CM

## 2021-03-24 DIAGNOSIS — Z86.39 PERSONAL HISTORY OF OTHER ENDOCRINE, NUTRITIONAL AND METABOLIC DISEASE: ICD-10-CM

## 2021-03-24 DIAGNOSIS — E22.1 HYPERPROLACTINEMIA: ICD-10-CM

## 2021-03-24 DIAGNOSIS — Z87.39 PERSONAL HISTORY OF OTHER DISEASES OF THE MUSCULOSKELETAL SYSTEM AND CONNECTIVE TISSUE: ICD-10-CM

## 2021-03-24 DIAGNOSIS — Z78.9 OTHER SPECIFIED HEALTH STATUS: ICD-10-CM

## 2021-03-24 DIAGNOSIS — R94.6 ABNORMAL RESULTS OF THYROID FUNCTION STUDIES: ICD-10-CM

## 2021-03-24 DIAGNOSIS — Z83.3 FAMILY HISTORY OF DIABETES MELLITUS: ICD-10-CM

## 2021-03-24 DIAGNOSIS — Z82.49 FAMILY HISTORY OF ISCHEMIC HEART DISEASE AND OTHER DISEASES OF THE CIRCULATORY SYSTEM: ICD-10-CM

## 2021-03-24 LAB
CORTIS 24H UR-MRATE: 1 MCG/24 H — LOW (ref 3.5–45)
CORTIS UR-MCNC: 200 ML — SIGNIFICANT CHANGE UP
CORTIS UR-MCNC: 24 H — SIGNIFICANT CHANGE UP
DEXAMETHASONE SERPL-MCNC: SIGNIFICANT CHANGE UP
GLUCOSE BLDC GLUCOMTR-MCNC: 86

## 2021-03-24 PROCEDURE — 36415 COLL VENOUS BLD VENIPUNCTURE: CPT

## 2021-03-24 PROCEDURE — 99204 OFFICE O/P NEW MOD 45 MIN: CPT | Mod: 25

## 2021-03-25 LAB
CORTIS SERPL-MCNC: 10.4 UG/DL
IGF-1 INTERP: NORMAL
IGF-I BLD-MCNC: 77 NG/ML
T3 SERPL-MCNC: 96 NG/DL
T4 FREE SERPL-MCNC: 1 NG/DL
TSH SERPL-ACNC: 0.29 UIU/ML

## 2021-03-27 PROBLEM — Z78.9 NON-SMOKER: Status: ACTIVE | Noted: 2021-03-24

## 2021-03-27 PROBLEM — Z83.79 FAMILY HISTORY OF HEPATIC CIRRHOSIS: Status: ACTIVE | Noted: 2021-03-24

## 2021-03-27 PROBLEM — Z87.81 HISTORY OF FRACTURE OF ANKLE: Status: RESOLVED | Noted: 2021-03-24 | Resolved: 2021-03-27

## 2021-03-27 PROBLEM — D35.2 PITUITARY MACROADENOMA: Status: ACTIVE | Noted: 2021-03-24

## 2021-03-27 PROBLEM — R94.6 ABNORMAL THYROID FUNCTION TEST: Status: ACTIVE | Noted: 2021-03-27

## 2021-03-27 PROBLEM — Z86.39 HISTORY OF DIABETES MELLITUS: Status: RESOLVED | Noted: 2021-03-24 | Resolved: 2021-03-27

## 2021-03-27 PROBLEM — E22.1 HYPERPROLACTINEMIA: Status: ACTIVE | Noted: 2021-03-27

## 2021-03-27 PROBLEM — Z84.89 PATIENT'S FATHER IS DECEASED: Status: ACTIVE | Noted: 2021-03-24

## 2021-03-27 PROBLEM — Z83.3 FAMILY HISTORY OF DIABETES MELLITUS: Status: ACTIVE | Noted: 2021-03-24

## 2021-03-27 PROBLEM — Z84.89 PATIENT'S MOTHER IS DECEASED: Status: ACTIVE | Noted: 2021-03-24

## 2021-03-27 PROBLEM — Z87.39 HISTORY OF LOW BACK PAIN: Status: RESOLVED | Noted: 2021-03-24 | Resolved: 2021-03-27

## 2021-03-27 PROBLEM — Z82.49 FAMILY HISTORY OF HYPERTENSION: Status: ACTIVE | Noted: 2021-03-24

## 2021-03-27 LAB
PROLACTIN SERPL-MCNC: 122 NG/ML
PROLACTIN SERPL-MCNC: 122.1 NG/ML
PROLACTIN SERPL-MCNC: 122.1 NG/ML

## 2021-03-27 RX ORDER — AMLODIPINE BESYLATE 10 MG/1
10 TABLET ORAL
Refills: 0 | Status: ACTIVE | COMMUNITY

## 2021-03-27 RX ORDER — CARVEDILOL 3.12 MG/1
3.12 TABLET, FILM COATED ORAL
Refills: 0 | Status: ACTIVE | COMMUNITY

## 2021-03-27 NOTE — ASSESSMENT
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  Differential diagnosis includes pituitary mass, meningioma, lymphoma, metastasis.  Prolactin was 113 and thought to be due to stalk effect.  Diluted prolactin 70.4, undiluted prolactin 75.3.\par LH and FSH were low.  Dexamethasone suppression test showed elevated cortisol.  TSH was low however free T4 was normal.\par She was unable to have MRI due to metal hardware in her ankle from approximately 30 years ago.\par Check repeat anterior pituitary hormones. \par She has an appointment for visual field testing.\par She has an appointment with neurosurgery.\par Further management pending blood work results.

## 2021-03-27 NOTE — REASON FOR VISIT
[Initial Evaluation] : an initial evaluation [Pituitary Evaluation/ Disorder] : pituitary evaluation/disorder [Other: _____] : [unfilled]

## 2021-03-27 NOTE — HISTORY OF PRESENT ILLNESS
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  Differential diagnosis includes pituitary mass, meningioma, lymphoma, metastasis.  Prolactin was 113 and thought to be due to stalk effect.  Diluted prolactin 70.4, undiluted prolactin 75.3.\par LH and FSH were low.  Dexamethasone suppression test showed elevated cortisol.  TSH was low however free T4 was normal.\par She was unable to have MRI due to metal hardware in her ankle from approximately 30 years ago.\par

## 2021-03-27 NOTE — PHYSICAL EXAM
[Alert] : alert [Well Nourished] : well nourished [Healthy Appearance] : healthy appearance [No Acute Distress] : no acute distress [Well Developed] : well developed [No Neck Mass] : no neck mass was observed [No LAD] : no lymphadenopathy [Supple] : the neck was supple [Thyroid Not Enlarged] : the thyroid was not enlarged [No Thyroid Nodules] : no palpable thyroid nodules [No Respiratory Distress] : no respiratory distress [Normal Rate] : heart rate was normal [de-identified] : right eyelid growth [de-identified] : use walker

## 2021-04-02 ENCOUNTER — NON-APPOINTMENT (OUTPATIENT)
Age: 81
End: 2021-04-02

## 2021-04-28 ENCOUNTER — APPOINTMENT (OUTPATIENT)
Dept: ENDOCRINOLOGY | Facility: CLINIC | Age: 81
End: 2021-04-28

## 2021-07-05 ENCOUNTER — INPATIENT (INPATIENT)
Facility: HOSPITAL | Age: 81
LOS: 9 days | Discharge: NURSING, REHAB OR LONG-TERM CARE | End: 2021-07-15
Attending: INTERNAL MEDICINE | Admitting: INTERNAL MEDICINE
Payer: MEDICARE

## 2021-07-05 VITALS
RESPIRATION RATE: 17 BRPM | OXYGEN SATURATION: 97 % | SYSTOLIC BLOOD PRESSURE: 150 MMHG | TEMPERATURE: 98 F | HEART RATE: 85 BPM | DIASTOLIC BLOOD PRESSURE: 91 MMHG

## 2021-07-05 DIAGNOSIS — N17.9 ACUTE KIDNEY FAILURE, UNSPECIFIED: ICD-10-CM

## 2021-07-05 DIAGNOSIS — R94.31 ABNORMAL ELECTROCARDIOGRAM [ECG] [EKG]: ICD-10-CM

## 2021-07-05 DIAGNOSIS — E83.52 HYPERCALCEMIA: ICD-10-CM

## 2021-07-05 DIAGNOSIS — Z98.890 OTHER SPECIFIED POSTPROCEDURAL STATES: Chronic | ICD-10-CM
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
## 2021-07-05 LAB
24R-OH-CALCIDIOL SERPL-MCNC: 37.7 NG/ML — SIGNIFICANT CHANGE UP (ref 30–80)
ALBUMIN SERPL ELPH-MCNC: 3.8 G/DL — SIGNIFICANT CHANGE UP (ref 3.3–5)
ALBUMIN SERPL ELPH-MCNC: 4 G/DL — SIGNIFICANT CHANGE UP (ref 3.3–5)
ALP SERPL-CCNC: 86 U/L — SIGNIFICANT CHANGE UP (ref 40–120)
ALP SERPL-CCNC: 90 U/L — SIGNIFICANT CHANGE UP (ref 40–120)
ALT FLD-CCNC: 10 U/L — SIGNIFICANT CHANGE UP (ref 4–33)
ALT FLD-CCNC: 8 U/L — SIGNIFICANT CHANGE UP (ref 4–33)
ANION GAP SERPL CALC-SCNC: 14 MMOL/L — SIGNIFICANT CHANGE UP (ref 7–14)
ANION GAP SERPL CALC-SCNC: 16 MMOL/L — HIGH (ref 7–14)
ANION GAP SERPL CALC-SCNC: 16 MMOL/L — HIGH (ref 7–14)
APPEARANCE UR: ABNORMAL
APTT BLD: 30.2 SEC — SIGNIFICANT CHANGE UP (ref 27–36.3)
AST SERPL-CCNC: 29 U/L — SIGNIFICANT CHANGE UP (ref 4–32)
AST SERPL-CCNC: 35 U/L — HIGH (ref 4–32)
B PERT DNA SPEC QL NAA+PROBE: SIGNIFICANT CHANGE UP
BACTERIA # UR AUTO: NEGATIVE — SIGNIFICANT CHANGE UP
BASE EXCESS BLDV CALC-SCNC: -0.4 MMOL/L — SIGNIFICANT CHANGE UP (ref -3–2)
BASOPHILS # BLD AUTO: 0.03 K/UL — SIGNIFICANT CHANGE UP (ref 0–0.2)
BASOPHILS NFR BLD AUTO: 0.6 % — SIGNIFICANT CHANGE UP (ref 0–2)
BILIRUB SERPL-MCNC: 0.8 MG/DL — SIGNIFICANT CHANGE UP (ref 0.2–1.2)
BILIRUB SERPL-MCNC: 0.8 MG/DL — SIGNIFICANT CHANGE UP (ref 0.2–1.2)
BILIRUB UR-MCNC: NEGATIVE — SIGNIFICANT CHANGE UP
BLOOD GAS VENOUS - CREATININE: 1.7 MG/DL — HIGH (ref 0.5–1.3)
BLOOD GAS VENOUS COMPREHENSIVE RESULT: SIGNIFICANT CHANGE UP
BLOOD GAS VENOUS COMPREHENSIVE RESULT: SIGNIFICANT CHANGE UP
BUN SERPL-MCNC: 26 MG/DL — HIGH (ref 7–23)
BUN SERPL-MCNC: 30 MG/DL — HIGH (ref 7–23)
BUN SERPL-MCNC: 32 MG/DL — HIGH (ref 7–23)
C PNEUM DNA SPEC QL NAA+PROBE: SIGNIFICANT CHANGE UP
CALCIUM SERPL-MCNC: 11.4 MG/DL — HIGH (ref 8.4–10.5)
CALCIUM SERPL-MCNC: 11.5 MG/DL — HIGH (ref 8.4–10.5)
CALCIUM SERPL-MCNC: 9.8 MG/DL — SIGNIFICANT CHANGE UP (ref 8.4–10.5)
CHLORIDE BLDV-SCNC: 104 MMOL/L — SIGNIFICANT CHANGE UP (ref 96–108)
CHLORIDE SERPL-SCNC: 100 MMOL/L — SIGNIFICANT CHANGE UP (ref 98–107)
CHLORIDE SERPL-SCNC: 98 MMOL/L — SIGNIFICANT CHANGE UP (ref 98–107)
CHLORIDE SERPL-SCNC: 99 MMOL/L — SIGNIFICANT CHANGE UP (ref 98–107)
CK MB BLD-MCNC: 2.3 % — SIGNIFICANT CHANGE UP (ref 0–2.5)
CK MB CFR SERPL CALC: 8.9 NG/ML — HIGH
CK SERPL-CCNC: 392 U/L — HIGH (ref 25–170)
CO2 SERPL-SCNC: 13 MMOL/L — LOW (ref 22–31)
CO2 SERPL-SCNC: 20 MMOL/L — LOW (ref 22–31)
CO2 SERPL-SCNC: 20 MMOL/L — LOW (ref 22–31)
COLOR SPEC: YELLOW — SIGNIFICANT CHANGE UP
CREAT SERPL-MCNC: 1.61 MG/DL — HIGH (ref 0.5–1.3)
CREAT SERPL-MCNC: 1.78 MG/DL — HIGH (ref 0.5–1.3)
CREAT SERPL-MCNC: 2.04 MG/DL — HIGH (ref 0.5–1.3)
DIFF PNL FLD: NEGATIVE — SIGNIFICANT CHANGE UP
EOSINOPHIL # BLD AUTO: 0.04 K/UL — SIGNIFICANT CHANGE UP (ref 0–0.5)
EOSINOPHIL NFR BLD AUTO: 0.7 % — SIGNIFICANT CHANGE UP (ref 0–6)
EPI CELLS # UR: 1 /HPF — SIGNIFICANT CHANGE UP (ref 0–5)
FLUAV SUBTYP SPEC NAA+PROBE: SIGNIFICANT CHANGE UP
FLUBV RNA SPEC QL NAA+PROBE: SIGNIFICANT CHANGE UP
FSH SERPL-MCNC: 1.8 IU/L — SIGNIFICANT CHANGE UP
GAS PNL BLDV: 129 MMOL/L — LOW (ref 136–146)
GH SERPL-MCNC: 0.04 NG/ML — LOW (ref 0.12–9.88)
GLUCOSE BLDV-MCNC: 108 MG/DL — HIGH (ref 70–99)
GLUCOSE SERPL-MCNC: 123 MG/DL — HIGH (ref 70–99)
GLUCOSE SERPL-MCNC: 85 MG/DL — SIGNIFICANT CHANGE UP (ref 70–99)
GLUCOSE SERPL-MCNC: 98 MG/DL — SIGNIFICANT CHANGE UP (ref 70–99)
GLUCOSE UR QL: NEGATIVE — SIGNIFICANT CHANGE UP
HADV DNA SPEC QL NAA+PROBE: SIGNIFICANT CHANGE UP
HCO3 BLDV-SCNC: 24 MMOL/L — SIGNIFICANT CHANGE UP (ref 20–27)
HCOV 229E RNA SPEC QL NAA+PROBE: SIGNIFICANT CHANGE UP
HCOV HKU1 RNA SPEC QL NAA+PROBE: SIGNIFICANT CHANGE UP
HCOV NL63 RNA SPEC QL NAA+PROBE: SIGNIFICANT CHANGE UP
HCOV OC43 RNA SPEC QL NAA+PROBE: SIGNIFICANT CHANGE UP
HCT VFR BLD CALC: 42.3 % — SIGNIFICANT CHANGE UP (ref 34.5–45)
HCT VFR BLD CALC: 43 % — SIGNIFICANT CHANGE UP (ref 34.5–45)
HCT VFR BLDA CALC: 40.7 % — SIGNIFICANT CHANGE UP (ref 34.5–46.5)
HGB BLD CALC-MCNC: 13.2 G/DL — SIGNIFICANT CHANGE UP (ref 11.5–15.5)
HGB BLD-MCNC: 13.5 G/DL — SIGNIFICANT CHANGE UP (ref 11.5–15.5)
HGB BLD-MCNC: 13.5 G/DL — SIGNIFICANT CHANGE UP (ref 11.5–15.5)
HMPV RNA SPEC QL NAA+PROBE: SIGNIFICANT CHANGE UP
HPIV1 RNA SPEC QL NAA+PROBE: SIGNIFICANT CHANGE UP
HPIV2 RNA SPEC QL NAA+PROBE: SIGNIFICANT CHANGE UP
HPIV3 RNA SPEC QL NAA+PROBE: SIGNIFICANT CHANGE UP
HPIV4 RNA SPEC QL NAA+PROBE: SIGNIFICANT CHANGE UP
IANC: 3.01 K/UL — SIGNIFICANT CHANGE UP (ref 1.5–8.5)
IMM GRANULOCYTES NFR BLD AUTO: 0.2 % — SIGNIFICANT CHANGE UP (ref 0–1.5)
INR BLD: 1.08 RATIO — SIGNIFICANT CHANGE UP (ref 0.88–1.16)
KETONES UR-MCNC: ABNORMAL
LACTATE BLDV-MCNC: 1.6 MMOL/L — SIGNIFICANT CHANGE UP (ref 0.5–2)
LEUKOCYTE ESTERASE UR-ACNC: NEGATIVE — SIGNIFICANT CHANGE UP
LH SERPL-ACNC: <0.3 IU/L — SIGNIFICANT CHANGE UP
LYMPHOCYTES # BLD AUTO: 1.9 K/UL — SIGNIFICANT CHANGE UP (ref 1–3.3)
LYMPHOCYTES # BLD AUTO: 35.5 % — SIGNIFICANT CHANGE UP (ref 13–44)
MAGNESIUM SERPL-MCNC: 2.2 MG/DL — SIGNIFICANT CHANGE UP (ref 1.6–2.6)
MCHC RBC-ENTMCNC: 28.7 PG — SIGNIFICANT CHANGE UP (ref 27–34)
MCHC RBC-ENTMCNC: 28.7 PG — SIGNIFICANT CHANGE UP (ref 27–34)
MCHC RBC-ENTMCNC: 31.4 GM/DL — LOW (ref 32–36)
MCHC RBC-ENTMCNC: 31.9 GM/DL — LOW (ref 32–36)
MCV RBC AUTO: 90 FL — SIGNIFICANT CHANGE UP (ref 80–100)
MCV RBC AUTO: 91.5 FL — SIGNIFICANT CHANGE UP (ref 80–100)
MONOCYTES # BLD AUTO: 0.36 K/UL — SIGNIFICANT CHANGE UP (ref 0–0.9)
MONOCYTES NFR BLD AUTO: 6.7 % — SIGNIFICANT CHANGE UP (ref 2–14)
NEUTROPHILS # BLD AUTO: 3.01 K/UL — SIGNIFICANT CHANGE UP (ref 1.8–7.4)
NEUTROPHILS NFR BLD AUTO: 56.3 % — SIGNIFICANT CHANGE UP (ref 43–77)
NITRITE UR-MCNC: NEGATIVE — SIGNIFICANT CHANGE UP
NRBC # BLD: 0 /100 WBCS — SIGNIFICANT CHANGE UP
NRBC # BLD: 0 /100 WBCS — SIGNIFICANT CHANGE UP
NRBC # FLD: 0 K/UL — SIGNIFICANT CHANGE UP
NRBC # FLD: 0 K/UL — SIGNIFICANT CHANGE UP
OSMOLALITY SERPL: 289 MOSM/KG — SIGNIFICANT CHANGE UP (ref 275–295)
PCO2 BLDV: 40 MMHG — LOW (ref 41–51)
PH BLDV: 7.39 — SIGNIFICANT CHANGE UP (ref 7.32–7.43)
PH UR: 6 — SIGNIFICANT CHANGE UP (ref 5–8)
PHOSPHATE SERPL-MCNC: 4.4 MG/DL — SIGNIFICANT CHANGE UP (ref 2.5–4.5)
PLATELET # BLD AUTO: 304 K/UL — SIGNIFICANT CHANGE UP (ref 150–400)
PLATELET # BLD AUTO: 328 K/UL — SIGNIFICANT CHANGE UP (ref 150–400)
PO2 BLDV: 44 MMHG — HIGH (ref 35–40)
POTASSIUM BLDV-SCNC: 4.4 MMOL/L — SIGNIFICANT CHANGE UP (ref 3.4–4.5)
POTASSIUM SERPL-MCNC: 4.9 MMOL/L — SIGNIFICANT CHANGE UP (ref 3.5–5.3)
POTASSIUM SERPL-MCNC: 5 MMOL/L — SIGNIFICANT CHANGE UP (ref 3.5–5.3)
POTASSIUM SERPL-MCNC: 5.8 MMOL/L — HIGH (ref 3.5–5.3)
POTASSIUM SERPL-SCNC: 4.9 MMOL/L — SIGNIFICANT CHANGE UP (ref 3.5–5.3)
POTASSIUM SERPL-SCNC: 5 MMOL/L — SIGNIFICANT CHANGE UP (ref 3.5–5.3)
POTASSIUM SERPL-SCNC: 5.8 MMOL/L — HIGH (ref 3.5–5.3)
PROLACTIN SERPL-MCNC: 130 NG/ML — HIGH (ref 3.4–24.1)
PROT SERPL-MCNC: 7.4 G/DL — SIGNIFICANT CHANGE UP (ref 6–8.3)
PROT SERPL-MCNC: 7.7 G/DL — SIGNIFICANT CHANGE UP (ref 6–8.3)
PROT UR-MCNC: ABNORMAL
PROTHROM AB SERPL-ACNC: 12.3 SEC — SIGNIFICANT CHANGE UP (ref 10.6–13.6)
PTH-INTACT FLD-MCNC: 174 PG/ML — HIGH (ref 15–65)
RAPID RVP RESULT: SIGNIFICANT CHANGE UP
RBC # BLD: 4.7 M/UL — SIGNIFICANT CHANGE UP (ref 3.8–5.2)
RBC # BLD: 4.7 M/UL — SIGNIFICANT CHANGE UP (ref 3.8–5.2)
RBC # FLD: 15.2 % — HIGH (ref 10.3–14.5)
RBC # FLD: 15.3 % — HIGH (ref 10.3–14.5)
RBC CASTS # UR COMP ASSIST: 1 /HPF — SIGNIFICANT CHANGE UP (ref 0–4)
RSV RNA SPEC QL NAA+PROBE: SIGNIFICANT CHANGE UP
RV+EV RNA SPEC QL NAA+PROBE: SIGNIFICANT CHANGE UP
SAO2 % BLDV: 76 % — SIGNIFICANT CHANGE UP (ref 60–85)
SARS-COV-2 RNA SPEC QL NAA+PROBE: SIGNIFICANT CHANGE UP
SODIUM SERPL-SCNC: 129 MMOL/L — LOW (ref 135–145)
SODIUM SERPL-SCNC: 133 MMOL/L — LOW (ref 135–145)
SODIUM SERPL-SCNC: 134 MMOL/L — LOW (ref 135–145)
SP GR SPEC: 1.03 — HIGH (ref 1.01–1.02)
TROPONIN T, HIGH SENSITIVITY RESULT: 106 NG/L — CRITICAL HIGH
TROPONIN T, HIGH SENSITIVITY RESULT: 76 NG/L — CRITICAL HIGH
TROPONIN T, HIGH SENSITIVITY RESULT: 90 NG/L — CRITICAL HIGH
UROBILINOGEN FLD QL: ABNORMAL
WBC # BLD: 5.35 K/UL — SIGNIFICANT CHANGE UP (ref 3.8–10.5)
WBC # BLD: 5.91 K/UL — SIGNIFICANT CHANGE UP (ref 3.8–10.5)
WBC # FLD AUTO: 5.35 K/UL — SIGNIFICANT CHANGE UP (ref 3.8–10.5)
WBC # FLD AUTO: 5.91 K/UL — SIGNIFICANT CHANGE UP (ref 3.8–10.5)
WBC UR QL: 1 /HPF — SIGNIFICANT CHANGE UP (ref 0–5)

## 2021-07-05 PROCEDURE — 99221 1ST HOSP IP/OBS SF/LOW 40: CPT

## 2021-07-05 PROCEDURE — 99285 EMERGENCY DEPT VISIT HI MDM: CPT | Mod: 25,GC

## 2021-07-05 PROCEDURE — 72125 CT NECK SPINE W/O DYE: CPT | Mod: 26

## 2021-07-05 PROCEDURE — 93010 ELECTROCARDIOGRAM REPORT: CPT

## 2021-07-05 PROCEDURE — 72170 X-RAY EXAM OF PELVIS: CPT | Mod: 26

## 2021-07-05 PROCEDURE — 99223 1ST HOSP IP/OBS HIGH 75: CPT

## 2021-07-05 PROCEDURE — 70450 CT HEAD/BRAIN W/O DYE: CPT | Mod: 26

## 2021-07-05 PROCEDURE — 71045 X-RAY EXAM CHEST 1 VIEW: CPT | Mod: 26

## 2021-07-05 RX ORDER — OLANZAPINE 15 MG/1
1.25 TABLET, FILM COATED ORAL ONCE
Refills: 0 | Status: COMPLETED | OUTPATIENT
Start: 2021-07-05 | End: 2021-07-05

## 2021-07-05 RX ORDER — HEPARIN SODIUM 5000 [USP'U]/ML
5000 INJECTION INTRAVENOUS; SUBCUTANEOUS EVERY 12 HOURS
Refills: 0 | Status: DISCONTINUED | OUTPATIENT
Start: 2021-07-05 | End: 2021-07-15

## 2021-07-05 RX ORDER — SODIUM CHLORIDE 9 MG/ML
1000 INJECTION, SOLUTION INTRAVENOUS
Refills: 0 | Status: DISCONTINUED | OUTPATIENT
Start: 2021-07-05 | End: 2021-07-08

## 2021-07-05 RX ORDER — SODIUM CHLORIDE 9 MG/ML
1000 INJECTION, SOLUTION INTRAVENOUS ONCE
Refills: 0 | Status: COMPLETED | OUTPATIENT
Start: 2021-07-05 | End: 2021-07-05

## 2021-07-05 RX ORDER — AMLODIPINE BESYLATE 2.5 MG/1
5 TABLET ORAL DAILY
Refills: 0 | Status: DISCONTINUED | OUTPATIENT
Start: 2021-07-05 | End: 2021-07-10

## 2021-07-05 RX ORDER — CARVEDILOL PHOSPHATE 80 MG/1
3.12 CAPSULE, EXTENDED RELEASE ORAL EVERY 12 HOURS
Refills: 0 | Status: DISCONTINUED | OUTPATIENT
Start: 2021-07-05 | End: 2021-07-15

## 2021-07-05 RX ORDER — SODIUM CHLORIDE 9 MG/ML
1000 INJECTION INTRAMUSCULAR; INTRAVENOUS; SUBCUTANEOUS
Refills: 0 | Status: DISCONTINUED | OUTPATIENT
Start: 2021-07-05 | End: 2021-07-05

## 2021-07-05 RX ADMIN — SODIUM CHLORIDE 1000 MILLILITER(S): 9 INJECTION, SOLUTION INTRAVENOUS at 02:23

## 2021-07-05 RX ADMIN — HEPARIN SODIUM 5000 UNIT(S): 5000 INJECTION INTRAVENOUS; SUBCUTANEOUS at 18:48

## 2021-07-05 RX ADMIN — OLANZAPINE 1.25 MILLIGRAM(S): 15 TABLET, FILM COATED ORAL at 23:42

## 2021-07-05 RX ADMIN — SODIUM CHLORIDE 125 MILLILITER(S): 9 INJECTION INTRAMUSCULAR; INTRAVENOUS; SUBCUTANEOUS at 12:59

## 2021-07-05 RX ADMIN — SODIUM CHLORIDE 75 MILLILITER(S): 9 INJECTION INTRAMUSCULAR; INTRAVENOUS; SUBCUTANEOUS at 10:31

## 2021-07-05 NOTE — CHART NOTE - NSCHARTNOTEFT_GEN_A_CORE
Patient with right ankle hardware due to fracture, unable to do to MRI until hardware is check for MR compatibility. Son Domingo Errol [phone] states he doesn't remember the year or the surgeon from [facility] who did surgery. I will call [facility] to obtain medical record if possible.

## 2021-07-05 NOTE — ED ADULT NURSE NOTE - OBJECTIVE STATEMENT
Patricia RN note- Patient arrives via ambay initially as code stroke that was canceled after patient was assessed by MD Aggarwal. Patient is mute and deaf. Patient awake and alert. Patient with equal movement of bilateral upper and lower extremities. Patient's son called EMS because she had a decreased appetite all day and was having twitch like movements to the right arm. Per EMS approximately three hours ago the patient's son found her on the floor and he felt her right arm looked limp. Patient moving her right arm in the ED. No acute distress. No physical indicators of pain present. Patient currently at CT scan. Safety maintained. 20g IV placed to left arm. Labs sent as ordered.

## 2021-07-05 NOTE — H&P ADULT - PROBLEM SELECTOR PLAN 3
- orthostatics if able  - monitor on tele   - fall precautions   - ambulate w/ assistance   - PT eval. Calcium level elevated 11.4 , 11.3 post IVF  -Obtain PTH and PTHrp, Vitamin D 1, 25-Dihydroxy and Vitamin D, 25-Hydroxy    -Obtain Thyroid and Parathyroid U/S  -C/w  cc/hr, monitor I's & O's  -Repeat BMP at 6 PM  -Endocrinology consult if needed

## 2021-07-05 NOTE — OCCUPATIONAL THERAPY INITIAL EVALUATION ADULT - LIVES WITH, PROFILE
Unable to collect social history secondary to pt. with noted muteness and deafness. As per H&P, pt lives at home with son, his girlfriend, and his stepdaughter. Pt requires a cane for functional mobility.

## 2021-07-05 NOTE — CONSULT NOTE ADULT - SUBJECTIVE AND OBJECTIVE BOX
CC:  Patient is a 81y old  Female who presents with a chief complaint of unwitnessed fall  HPI:  81F pmhx of HTN, deafness and muteness from birth BIBEM for unwitnessed fall. Patient poor historian as ED tried to communicate with a sign , but patient unable to communicate/utilize. Collateral information per chart review  unsuccessful attempt to reach Son at [phone] (no answer). As per EMS, the son called EMS because patient had unwitnessed fall and he noticed patient's right arm weakness and tremors. Unable to assess ROS.     In ED, /91, HR85, RR17, T97.5f, o2sat 97% on room air. Code stroke activated. CTH shows no acute intracranial hemorrhage, hyperdense sellar-suprasellar mass measuring up to 1.9 x 1.6 cm may be slightly increased in size from 1.7 x 1.4 cm on 2021. EKG shows NSR with TWI in II, III, aVF, V3-6 which is new from 3/2021. troponin 106-->90-->76, CKMB 8.8-->8.9, -->392, Scr 1.78. Cardiology consult called for elevated troponin and abnormal EKG.          Allergies    No Known Allergies    Intolerances    	    MEDICATIONS  amLODIPine 10 mg oral tablet: 1 tab(s) orally once a day  ·carvedilol 3.125 mg oral tablet: 1 tab(s) orally every 12 hours              PAST MEDICAL & SURGICAL HISTORY:  HTN (hypertension)    Deaf    Mute    No significant past surgical history        FAMILY HISTORY:  No pertinent family history in first degree relatives        SOCIAL HISTORY    Marital Status:   Occupation:   Lives with:     SUBSTANCE USE  Tobacco Usage:  ( ) None ( ) never smoked   ( ) former smoker  ( ) current smoker; Packs per day:   Alcohol Usage: ( ) none  ( ) occasional ( ) 2-3 times a week ( ) daily; Last drink:   Recreational drugs ( ) None      VITAL SIGNS  T(C): 36.4 (21 @ 00:28), Max: 36.4 (21 @ 00:28)  HR: 89 (21 @ 01:19) (85 - 89)  BP: 124/101 (21 @ 01:19) (124/101 - 150/91)  RR: 20 (21 @ 01:19) (17 - 20)  SpO2: 100% (21 @ 01:19) (97% - 100%)  Wt(kg): --    Appearance: NAD, no distress  HEENT: Moist Mucous Membranes, left eye closed, left eye lid skin tag noted  Cardiovascular: Regular rate and rhythm, Normal S1 S2, No JVD, No murmurs  Respiratory: Lungs clear to auscultation. No rales, No rhonchi, No wheezing. No tenderness to palpation  Gastrointestinal:  Soft, Non-tender, + BS  Neurologic: Non-focal, A&Ox3  Skin: Warm and dry, No rashes,  Musculoskeletal: No clubbing, No cyanosis, No edema  Vascular: Peripheral pulses palpable 2+ bilaterally        LABORATORY VALUES	 	                          13.5   5.35  )-----------( 328      ( 2021 01:11 )             42.3           134<L>  |  98  |  26<H>  ----------------------------<  123<H>  5.0   |  20<L>  |  1.78<H>    Ca    11.5<H>      2021 01:11    TPro  7.7  /  Alb  4.0  /  TBili  0.8  /  DBili  x   /  AST  29  /  ALT  8   /  AlkPhos  90      LIVER FUNCTIONS - ( 2021 01:11 )  Alb: 4.0 g/dL / Pro: 7.7 g/dL / ALK PHOS: 90 U/L / ALT: 8 U/L / AST: 29 U/L / GGT: x           Activated Partial Thromboplastin Time: 30.2 sec ( @ 01:11)      CARDIAC MARKERS:  Creatine Kinase, Serum: 370 U/L ( @ 03:00)  troponin 106-->90-->76  CKMB 8.8-->8.9      Blood Gas Venous - Lactate: 1.6 mmol/L ( @ 04:54)  Blood Gas Venous - Lactate: 4.6 mmol/L ( @ 01:11)        Thyroid Stimulating Hormone, Serum: <0.10 uIU/mL ( @ 21:51)      Urinalysis Basic - ( 2021 03:59 )    Color: Yellow / Appearance: Slightly Turbid / S.026 / pH: x  Gluc: x / Ketone: Trace  / Bili: Negative / Urobili: 3 mg/dL   Blood: x / Protein: 30 mg/dL / Nitrite: Negative   Leuk Esterase: Negative / RBC: 1 /HPF / WBC 1 /HPF   Sq Epi: x / Non Sq Epi: 1 /HPF / Bacteria: Negative      CAPILLARY BLOOD GLUCOSE           @ 01:42  229E Corona Virus --  Adenovirus NotDetec  Bordetella pertussis --  Chlamydia pneumoniae NotDetec  Entero/Rhino Virus NotDetec  HKU1 Coronavirus --  hMPV NotDetec  Influenza A NotDetec  Influenza AH1 --  Influenza AH1  --  Influenza AH3 --  Influenza B NotDetec  Mycoplasma pneumoniae NotDetec  NL63 Coronavirus --  OC43 Corornavirus --  Parainfluenza 1 NotDetec  Parainfluenza 2 NotDetec        ECG: NSR with TWI in II, III, aVF, V3-6	    RADIOLOGY:  < from: Xray Chest 1 View- PORTABLE-Urgent (Xray Chest 1 View- PORTABLE-Urgent .) (21 @ 02:09) >  EXAM:  XR CHEST PORTABLE URGENT 1V        PROCEDURE DATE:  2021     ******PRELIMINARY REPORT******    ******PRELIMINARY REPORT******            INTERPRETATION:  No emergent findings. F/u official report.          < end of copied text >  < from: CT Cervical Spine No Cont (21 @ 00:51) >  IMPRESSION:  CT Head:  1.No CT evidence of acute intracranial hemorrhage, subdural collection, acute territorial infarct, hydrocephalus or calvarial fracture.  2.  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 Lobulated mass is again seen extending into the parasellar regions and along the petroclinoid ligaments bilaterally.   Mass is again seen extending along the dorsum of the clivus and into the prepontine space, where it exerts mass effect on the ventral judit.  3.  A subtle defect in the floor of the bony sella is better seen on thin cut images from prior CTA on 2019.  There is a 1.8 cm mucous retention cyst versus polyp or mass in the right sphenoid sinus.  4.  Contrast-enhanced MRI may be obtained for further evaluation.    CT Cervical Spine: No CT evidence of cervical spine fracture or traumatic malalignment.            < end of copied text >  < from: Xray Pelvis AP only (21 @ 02:09) >        EXAM:  RAD PELVIS AP ONLY        PROCEDURE DATE:  2021     ******PRELIMINARY REPORT******    ******PRELIMINARY REPORT******            INTERPRETATION:  No acute fracture. F/u official report.          ******PRELIMINARY REPORT******    ******PRELIMINARY REPORT******            < end of copied text >  	    PREVIOUS DIAGNOSTIC TESTING:    [ ] Echocardiogram:  < from: Transthoracic Echocardiogram (03.15.21 @ 11:12) >  Ejection Fraction (Teicholtz): 61 %  ------------------------------------------------------------------------    CONCLUSIONS:  1. Mitral annular calcification, otherwise normal mitral  valve. Minimal mitral regurgitation.  2. Normal left ventricular internal dimensions and wall  thicknesses.  3. Endocardium not well visualized; grossly normal left  ventricular systolic function.  4. Mild diastolic dysfunction (Stage I).  5. The right ventricle is not well visualized; grossly  normal right ventricular systolic function.  ------------------------------------------------------------------------  Confirmed on  3/15/2021 - 12:51:29 by Aquiles Patel M.D.,  [facility], [place]  ------------------------------------------------------------------------    < end of copied text >      ASSESSMENT AND PLAN  81F pmhx of HTN, deafness and muteness from birth, BIBEM for unwitnessed fall. Cardiology consult called for elevated troponin and abnormal EKG.          Patient poor historian as ED tried to communicate with a sign , but patient unable to communicate/utilize. Collateral information per chart review / unsuccessful attempt to reach Son at [phone] (no answer). As per EMS, the son called EMS because patient had unwitnessed fall and he noticed patient's right arm weakness and tremors. Unable to assess ROS.     In ED, Code stroke activated. CTH shows no acute intracranial hemorrhage, hyperdense sellar-suprasellar mass measuring up to 1.9 x 1.6 cm may be slightly increased in size from 1.7 x 1.4 cm on 2021. EKG shows NSR with TWI in II, III, aVF, V3-6 which is new from 3/2021. troponin 106-->90-->76, CKMB 8.8-->8.9, -->392.     PLAN  #R/O ACS - EKG shows NSR with TWI in II, III, aVF, V3-6 (new from 3/2021). troponin 106-->90-->76, CKMB 8.8-->8.9, -->392. Less concern for ACS, elevated troponin possibly related to elevated creatinine.  -continue telemetry  -sent A1c, lipid, TSH  -Assess for chest pain. Serial EKG PRN chest pain to assess for ST changes  -C/w Coreg and amlodipine  -ECHO in am : to evaluate LV function and wall motion abnormalities  -Low fat DASH diet  -house cardiology will follow, call # [phone] for questions.    Case discussed with Dr. Vanesa Griggs, cardiology fellow  Attending attestation to follow.               CC:  Patient is a 81y old  Female who presents with a chief complaint of unwitnessed fall  HPI:  81F pmhx of HTN, deafness and muteness from birth, BIBEM for unwitnessed fall. Patient poor historian as ED tried to communicate with a sign , but patient unable to communicate/utilize. Collateral information per chart review  unsuccessful attempt to reach Son at [phone] (no answer) at 5:29am. As per EMS, the son called EMS because patient had unwitnessed fall and he noticed patient's right arm weakness and tremors. Unable to assess ROS.     In ED, /91, HR85, RR17, T97.5f, o2sat 97% on room air. Code stroke activated. CTH shows no acute intracranial hemorrhage, hyperdense sellar-suprasellar mass measuring up to 1.9 x 1.6 cm may be slightly increased in size from 1.7 x 1.4 cm on 2021. EKG shows NSR with TWI in II, III, aVF, V3-6 which is new from 3/2021. troponin 106-->90-->76, CKMB 8.8-->8.9, -->392, BUN 26, Scr 1.78 (previous admission 1.1). Cardiology consult called for elevated troponin and abnormal EKG. At the time of my encounter, patient was not in any acute distress.         Allergies    No Known Allergies    Intolerances    	    MEDICATIONS  amLODIPine 10 mg oral tablet: 1 tab(s) orally once a day  ·carvedilol 3.125 mg oral tablet: 1 tab(s) orally every 12 hours              PAST MEDICAL & SURGICAL HISTORY:  HTN (hypertension)    Deaf    Mute    No significant past surgical history        FAMILY HISTORY:  No pertinent family history in first degree relatives        SOCIAL HISTORY    Marital Status:   Occupation:   Lives with:     SUBSTANCE USE  Tobacco Usage:  ( ) None ( ) never smoked   ( ) former smoker  ( ) current smoker; Packs per day:   Alcohol Usage: ( ) none  ( ) occasional ( ) 2-3 times a week ( ) daily; Last drink:   Recreational drugs ( ) None      VITAL SIGNS  T(C): 36.4 (21 @ 00:28), Max: 36.4 (21 @ 00:28)  HR: 89 (21 @ 01:19) (85 - 89)  BP: 124/101 (21 @ 01:19) (124/101 - 150/91)  RR: 20 (21 @ 01:19) (17 - 20)  SpO2: 100% (21 @ 01:19) (97% - 100%)  Wt(kg): --    Appearance: NAD, no distress  HEENT: Moist Mucous Membranes, left eye closed, left eye lid skin tag noted  Cardiovascular: Regular rate and rhythm, Normal S1 S2, No JVD, No murmurs  Respiratory: Lungs clear to auscultation. No rales, No rhonchi, No wheezing. No tenderness to palpation  Gastrointestinal:  Soft, Non-tender, + BS  Neurologic: Non-focal, A&Ox3  Skin: Warm and dry, No rashes,  Musculoskeletal: No clubbing, No cyanosis, No edema  Vascular: Peripheral pulses palpable 2+ bilaterally        LABORATORY VALUES	 	                          13.5   5.35  )-----------( 328      ( 2021 01:11 )             42.3           134<L>  |  98  |  26<H>  ----------------------------<  123<H>  5.0   |  20<L>  |  1.78<H>    Ca    11.5<H>      2021 01:11    TPro  7.7  /  Alb  4.0  /  TBili  0.8  /  DBili  x   /  AST  29  /  ALT  8   /  AlkPhos  90      LIVER FUNCTIONS - ( 2021 01:11 )  Alb: 4.0 g/dL / Pro: 7.7 g/dL / ALK PHOS: 90 U/L / ALT: 8 U/L / AST: 29 U/L / GGT: x           Activated Partial Thromboplastin Time: 30.2 sec ( @ 01:11)      CARDIAC MARKERS:  Creatine Kinase, Serum: 370 U/L ( @ 03:00)  troponin 106-->90-->76  CKMB 8.8-->8.9      Blood Gas Venous - Lactate: 1.6 mmol/L ( @ 04:54)  Blood Gas Venous - Lactate: 4.6 mmol/L ( @ 01:11)        Thyroid Stimulating Hormone, Serum: <0.10 uIU/mL ( @ 21:51)      Urinalysis Basic - ( 2021 03:59 )    Color: Yellow / Appearance: Slightly Turbid / S.026 / pH: x  Gluc: x / Ketone: Trace  / Bili: Negative / Urobili: 3 mg/dL   Blood: x / Protein: 30 mg/dL / Nitrite: Negative   Leuk Esterase: Negative / RBC: 1 /HPF / WBC 1 /HPF   Sq Epi: x / Non Sq Epi: 1 /HPF / Bacteria: Negative      CAPILLARY BLOOD GLUCOSE           @ 01:42  229E Corona Virus --  Adenovirus NotDetec  Bordetella pertussis --  Chlamydia pneumoniae NotDetec  Entero/Rhino Virus NotDetec  HKU1 Coronavirus --  hMPV NotDetec  Influenza A NotDete  Influenza AH1 --  Influenza AH1  --  Influenza AH3 --  Influenza B NotDetec  Mycoplasma pneumoniae NotDete  NL63 Coronavirus --  OC43 Corornavirus --  Parainfluenza 1 NotDetec  Parainfluenza 2 NotDete        ECG: NSR with TWI in II, III, aVF, V3-6	    RADIOLOGY:  < from: Xray Chest 1 View- PORTABLE-Urgent (Xray Chest 1 View- PORTABLE-Urgent .) (21 @ 02:09) >  EXAM:  XR CHEST PORTABLE URGENT 1V        PROCEDURE DATE:  2021     ******PRELIMINARY REPORT******    ******PRELIMINARY REPORT******            INTERPRETATION:  No emergent findings. F/u official report.          < end of copied text >  < from: CT Cervical Spine No Cont (21 @ 00:51) >  IMPRESSION:  CT Head:  1.No CT evidence of acute intracranial hemorrhage, subdural collection, acute territorial infarct, hydrocephalus or calvarial fracture.  2.  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 Lobulated mass is again seen extending into the parasellar regions and along the petroclinoid ligaments bilaterally.   Mass is again seen extending along the dorsum of the clivus and into the prepontine space, where it exerts mass effect on the ventral judit.  3.  A subtle defect in the floor of the bony sella is better seen on thin cut images from prior CTA on 2019.  There is a 1.8 cm mucous retention cyst versus polyp or mass in the right sphenoid sinus.  4.  Contrast-enhanced MRI may be obtained for further evaluation.    CT Cervical Spine: No CT evidence of cervical spine fracture or traumatic malalignment.            < end of copied text >  < from: Xray Pelvis AP only (21 @ 02:09) >        EXAM:  RAD PELVIS AP ONLY        PROCEDURE DATE:  2021     ******PRELIMINARY REPORT******    ******PRELIMINARY REPORT******            INTERPRETATION:  No acute fracture. F/u official report.          ******PRELIMINARY REPORT******    ******PRELIMINARY REPORT******            < end of copied text >  	    PREVIOUS DIAGNOSTIC TESTING:    [ ] Echocardiogram:  < from: Transthoracic Echocardiogram (03.15.21 @ 11:12) >  Ejection Fraction (Teicholtz): 61 %  ------------------------------------------------------------------------    CONCLUSIONS:  1. Mitral annular calcification, otherwise normal mitral  valve. Minimal mitral regurgitation.  2. Normal left ventricular internal dimensions and wall  thicknesses.  3. Endocardium not well visualized; grossly normal left  ventricular systolic function.  4. Mild diastolic dysfunction (Stage I).  5. The right ventricle is not well visualized; grossly  normal right ventricular systolic function.  ------------------------------------------------------------------------  Confirmed on  3/15/2021 - 12:51:29 by Aquiles Patel M.D.,  [facility], [place]  ------------------------------------------------------------------------    < end of copied text >

## 2021-07-05 NOTE — ED PROVIDER NOTE - CLINICAL SUMMARY MEDICAL DECISION MAKING FREE TEXT BOX
82 y/o F with h/o HTN, deaf, mute BIB EMS after found on the floor after poss fall tonight.  Hx is limited due to pt's deafness/muteness and unable to communicate via .  No focal neuro deficits on exam.  Syncope vs mech fall, do not suspect stroke.  Will obtain labs, ekg, ct head, cxr/pelvis xr, admit.

## 2021-07-05 NOTE — OCCUPATIONAL THERAPY INITIAL EVALUATION ADULT - GENERAL OBSERVATIONS, REHAB EVAL
Patient received semisupine in stretcher with NAD +Tele +Primafit +Heplock. Pt agreed and performed activity as tolerated.

## 2021-07-05 NOTE — H&P ADULT - NSHPSOCIALHISTORY_GEN_ALL_CORE
Per son, patient lives with son, his girlfriend, and his stepdaughter. Uses cane and walker to ambulate but has not been able to walk since yesterday. No history of smoking or drinking alcohol

## 2021-07-05 NOTE — CONSULT NOTE ADULT - ATTENDING COMMENTS
Date of service: 7/5/2021    Patient seen and examined at bedside.     82 yo female with hx of HTN, deaf/mute, presented from home via EMS for a fall. Neurology consulted for possible seizure vs stroke. Patient is bedbound at baseline and was found on the side of her bed with right arm weakness and son called EMS. No reported/witnessed convulsions. Of note, patient had similar presentation in [facility]Arch 2021, when she presented with syncope and had transient RUE weakness followed by LUE weakness.     CT head in ED showed large sellar-supracellar mass (noted on CT head from 3/2021 and now slightly increased in size from 1.7 x1.4 cm to 1.9x1.6cm.     CT C spine with multilevel spondylosis with mild canal stenosis.    Patient mute and deaf. She is awake and makes eye contact. She makes appropriate gesturing in sign language. No facial droop or extremity noted. No definite focal deficits noted on exam. PERRL. Exam was notable of slight rhythmic twitching movements of RUE.      Creatinine 1.78, BUN 26 (? 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 Sandip paralysis) with RUE rhythmic twitching movements on exam, recommend obtaining VEEG. Can consider ativan challenge to see if RUE twitching resolves. If VEEG shows seizure activity, recommend loading with 1g keppra and started on keppra 500 mg BID.    Recommend MRI brain w/w/o    Neurology will follow.
Patient seen/examined during morning rounds.  Assessment and recommendations were formulated with Cardiology CCU NP, and as outlined above.
Agree with the above assessment and plan. Patient is bedbound at baseline and not an appropriate surgical candidate for resection of a large sellar lesion. Would recommend MRI when able to be cleared for MRI machine.

## 2021-07-05 NOTE — H&P ADULT - ASSESSMENT
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    In ED, CT head showed No CT evidence of acute intracranial hemorrhage, subdural collection, acute territorial infarct, hydrocephalus or calvarial fracture.  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   - Troponin 106, 90, 76  - EKG new T wave inversion from 3/2021  - Creatinine was 1.78 up from 1.1 from 3/2021  - Neurology and Cardiology were consulted by ED.     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

## 2021-07-05 NOTE — OCCUPATIONAL THERAPY INITIAL EVALUATION ADULT - ANTICIPATED DISCHARGE DISPOSITION, OT EVAL
Patient will benefit from restorative rehabilitation in order for patient to improve on ADLs and functional mobility.

## 2021-07-05 NOTE — H&P ADULT - PROBLEM SELECTOR PLAN 2
CT head showed No CT evidence of acute intracranial hemorrhage, subdural collection, acute territorial infarct, hydrocephalus or calvarial fracture.  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   - Neurology consulted recommends:   - rEEG  - MRI brain w/wo contrast if possible   - Neurosurgical evaluation  - PT/OT  - No indication for AEDs at this time  Will obtain - endocrine labs: ACTH, TSH, LH/FSH, Prolactin, GH CT head showed No CT evidence of acute intracranial hemorrhage, subdural collection, acute territorial infarct, hydrocephalus or calvarial fracture.  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   - Neurology consulted recommends:   - rEEG  - MRI brain w/wo contrast if possible - will investigate RLE hardware  - Neurosurgical evaluation  - PT/OT  - No indication for AEDs at this time  Will obtain - endocrine labs: ACTH, TSH, LH/FSH, Prolactin, GH CT head showed no CT evidence of acute intracranial hemorrhage, subdural collection, acute territorial infarct, hydrocephalus or calvarial fracture.  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

## 2021-07-05 NOTE — ED PROVIDER NOTE - PROGRESS NOTE DETAILS
Marleny Gorman, resident MD: pt has EMMANUEL on blood work and known mass on CTH. pt has elevated trop to 109 downtrending to 90 with t-wave inversion in lateral leads changed from previous ekg. cardiology was consulted. neurology was also consulted for possible seizure.

## 2021-07-05 NOTE — H&P ADULT - PROBLEM SELECTOR PLAN 1
Creatinine was 1.78 up from 1.1 from 3/2021  Check urine lytes.   Monitor I & O’s  Daily weight  Renal U/S if needed  Avoid NSAIDs and nephrotoxic agents  Hydration Creatinine was 1.78 up from 1.1 from 3/2021, now uptrending to 2; possibly 2/2 obstruction vs dehydration vs 2/2 hypercalcemia  -Check bladder scan to r/o obstruction  -Check urine sodium and creatinine  -C/w IVF  -Monitor I & O’s  -US Renal ordered  -Avoid NSAIDs and nephrotoxic agents

## 2021-07-05 NOTE — OCCUPATIONAL THERAPY INITIAL EVALUATION ADULT - PLANNED THERAPY INTERVENTIONS, OT EVAL
ADL retraining/balance training/bed mobility training/strengthening/transfer training ADL retraining/balance training/bed mobility training/neuromuscular re-education/parent/caregiver training.../strengthening/transfer training

## 2021-07-05 NOTE — OCCUPATIONAL THERAPY INITIAL EVALUATION ADULT - NS ASR FOLLOW COMMAND OT EVAL
Noted difficulty communicating secondary to pt is mute and deaf. Patient benefits from nonverbal gestures for communication./50% of the time/able to follow single-step instructions/unable to follow multi-step instructions/unable to answer questions Noted difficulty communicating secondary to pt is mute and deaf. Patient benefits from nonverbal gestures for communication./50% of the time/able to follow single-step instructions/unable to answer questions

## 2021-07-05 NOTE — ED ADULT TRIAGE NOTE - BP NONINVASIVE DIASTOLIC (MM HG)
(0) no cry (awake or asleep)/(0) lying quietly, normal position, moves easily/(0) content, relaxed/(0) no particular expression or smile/(0) normal position or relaxed
91

## 2021-07-05 NOTE — H&P ADULT - PROBLEM SELECTOR PLAN 5
DVT prophylaxis - Heparin 5000 SC BID - As per son patient with history of HTN was discharged with Coreg 3.125 BID and Amlodipine 10 mg QD in 3/2021, was not taking any medication at home.  - BP in /91 and 124/101  - DASH/TLC diet  - c/w home meds w/ holding parameters  - monitor BP & titrate BP meds PRN. Calcium level elevated 11.4 , 11.3 post IV fluid  Obtain PTH and PTH related peptide level, Vitamin D 1, 25-Dihydroxy and Vitamin D, 25-Hydroxy    Obtain Renal, Thyroid, Parathyroid U/S   cc/hr, monitor I's & O's  Repeat BMP  Endocrinology consult if needed EKG with new T wave inversion from 3/2021  - Troponin 106 --> 90 --> 76  - Cardiology consulted, appreciate recs  - Monitor on telemetry  - Check A1c, lipid, TSH  - TTE ordered

## 2021-07-05 NOTE — H&P ADULT - PROBLEM SELECTOR PLAN 4
- As per son patient with history of HTN was discharged with Coreg 3.125 BID and Amlodipine 10 mg QD in 3/2021, was not taking any medication at home.  - BP in /91 and 124/101  - DASH/TLC diet  - c/w home meds w/ holding parameters  - monitor BP & titrate BP meds PRN. - EKG new T wave inversion from 3/2021  - Troponin 106, 90, 76  - Cardiology consulted recommends:  EKG shows NSR with TWI in II, III, aVF, V3-6 (new from 3/2021). troponin 106-->90-->76, CK MB 8.8-->8.9, -->392. Less concern for ACS, elevated troponin possibly related to EMMANUEL/ elevated creatinine.  -continue telemetry  -send A1c, lipid, TSH  -Assess for chest pain. Serial EKG PRN chest pain to assess for ST changes  -C/w Coreg and amlodipine  -ECHO : to evaluate LV function and wall motion abnormalities - orthostatics if able  - xray chest and pelvis with no acute fractures  - monitor on tele   - fall precautions   - ambulate w/ assistance   - PT eval

## 2021-07-05 NOTE — OCCUPATIONAL THERAPY INITIAL EVALUATION ADULT - PERTINENT HX OF CURRENT PROBLEM, REHAB EVAL
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 on 07/05/21 s/p fall at home. CT of head done on 07/05/21 reported 1. No CT evidence of acute intracranial hemorrhage, subdural collection, acute territorial infarct, hydrocephalus or calvarial fracture.(see continuation below)

## 2021-07-05 NOTE — CONSULT NOTE ADULT - SUBJECTIVE AND OBJECTIVE BOX
NEUROSURGERY CONSULT    HPI: 81y Female with h/o HTN, deaf and mute BIB EMS after a fall.  Per EMS report pt was found on the floor by her son around 8:30pm. Initially presented as a CODE STROKE which was cancelled on agreement with ED attending as patient did not meet [facility] criteria. Neurology consulted for concern of possible seizure. Collateral per chart review as follows: Son reportedly last saw the patient at 8pm when he put her in bed.  Pt is bedbound at baseline.  Reportedly pt had right arm weakness after the fall per EMS. Of note patient had admission for similar presentation of syncope with RUE weakness followed by LUE weakness in March 2021. 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 Patient was unable to get MRI at that time due to potential hardware in ankle. Patient was discharged home with neurosurgery and endocrinology follow up. Unclear if patient saw Neurosurgery outpatient. At that time patient was not a surgical candidate.     RADIOLOGY:   < from: CT Cervical Spine No Cont (07.05.21 @ 00:51) >  IMPRESSION:  CT Head:  1. No CT evidence of acute intracranial hemorrhage, subdural collection, acute territorial infarct, hydrocephalus or calvarial fracture.  2.  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 Lobulated mass is again seen extending into the parasellar regions and along the petroclinoid ligaments bilaterally.   Mass is again seen extending along the dorsum of the clivus and into the prepontine space, where it exerts mass effect on the ventral judit.  3.  A subtle defect in the floor of the bony sella is better seen on thin cut images from prior CTA on 03/16/2019.  There is a 1.8 cm mucous retention cyst versus polyp or mass in the right sphenoid sinus.  4.  Contrast-enhanced MRI may be obtained for further evaluation.    CT Cervical Spine: No CT evidence of cervical spine fracture or traumatic malalignment.      MEDS:  amLODIPine   Tablet 5 milliGRAM(s) Oral daily  carvedilol 3.125 milliGRAM(s) Oral every 12 hours  heparin   Injectable 5000 Unit(s) SubCutaneous every 12 hours  sodium chloride 0.9%. 1000 milliLiter(s) IV Continuous <Continuous>      PHYSICAL EXAM:  Vital Signs Last 24 Hrs  T(C): 36.3 (05 Jul 2021 08:56), Max: 36.6 (05 Jul 2021 07:36)  T(F): 97.4 (05 Jul 2021 08:56), Max: 97.9 (05 Jul 2021 07:36)  HR: 94 (05 Jul 2021 08:56) (75 - 94)  BP: 112/65 (05 Jul 2021 08:56) (111/82 - 150/91)  BP(mean): --  RR: 16 (05 Jul 2021 08:56) (16 - 20)  SpO2: 98% (05 Jul 2021 08:56) (97% - 100%)    Awake, alert, mute, deaf, unable to communicate with patient    Cranial Nerves: L pupil 4mm and reactive, R pupil surgical 6mm. EOMI. No facial asymmetry. Deaf at baseline  Moves all extremities antigravity x 4   Cannot assess for dysmetria   Sensation intact to light touch       LABS:                        13.5   5.35  )-----------( 328      ( 05 Jul 2021 01:11 )             42.3     07-05    134<L>  |  98  |  26<H>  ----------------------------<  123<H>  5.0   |  20<L>  |  1.78<H>    Ca    11.5<H>      05 Jul 2021 01:11    TPro  7.7  /  Alb  4.0  /  TBili  0.8  /  DBili  x   /  AST  29  /  ALT  8   /  AlkPhos  90  07-05    PT/INR - ( 05 Jul 2021 01:11 )   PT: 12.3 sec;   INR: 1.08 ratio         PTT - ( 05 Jul 2021 01:11 )  PTT:30.2 sec

## 2021-07-05 NOTE — ED ADULT TRIAGE NOTE - CHIEF COMPLAINT QUOTE
Pt arrives to ED via EMS s/p unwitnessed fall at 20:30.  Pt not on blood thinners.  Pt is deaf and mute.  Right arm is weak and unable to  which is new with new tremors present as per son.  Pt son states pt has not eaten all day and pt has been climbing from bed which may account for fall.  fs by EMS = 103.  Pt communicates by sign language with family but has not signed since fall.   CHRIS assessed pt.  Code stroke called.

## 2021-07-05 NOTE — OCCUPATIONAL THERAPY INITIAL EVALUATION ADULT - ADDITIONAL COMMENTS
(see continuation above)   2. 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 Lobulated mass is again seen extending into the parasellar regions and along the petroclinoid ligaments bilaterally. Mass is again seen extending along the dorsum of the clivus and into the prepontine space, where it exerts mass effect on the ventral judit. 3.  A subtle defect in the   floor of the bony sella is better seen on thin cut images from prior CTA on 03/16/2019.  There is a 1.8 cm mucous retention cyst versus polyp or mass in the right sphenoid sinus. 4. Contrast-enhanced MRI may be obtained for further evaluation. Pt is admitted for further evaluation.

## 2021-07-05 NOTE — H&P ADULT - PROBLEM SELECTOR PLAN 7
DVT prophylaxis - Heparin 5000 SC BID DVT prophylaxis: HSQ  Diet: NPO, patient spat up pills given in ED; speech and swallow eval ordered  Dispo: PT eval

## 2021-07-05 NOTE — H&P ADULT - ATTENDING COMMENTS
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 Patient unable to provider any history, collateral information obtained from patient's son. Patient found s/p fall at home and patient's son also noticed that patient's RUE seemed weaker than normal with some tremors. Patient found to have EMMANUEL, hypercalcemia of unclear etiology and slight increase in size of suprasellar mass. Patient now appears to be at her baseline mental status.     EMMANUEL- Cr now elevated to 2, baseline appears to be 1.2-1.3. Check US renal, c/w IVF, treat hypercalcemia, check bladder scan to r/o obstruction. Monitor strict intake and output. Check urine sodium and creatinine.    Hypercalcemia- 11.4 with normal albumin; calcium levels normal-high range during previous admission in March. Check PTH, PTHrp, Vitamin D levels, US thyroid/parathyroid. C/w IVF, if no improvement in 24h can consider calcitonin. Bisphosphonate options limited given EMMANUEL.    Suprasellar mass- repeat CTH with slight increase in size from prior in March 2021. Neurosurgery and neurology recommending MRI, however patient has ankle hardware from many years ago, unclear if MRI compatible or not- will attempt to obtain medical records from [facility]. Check ACTH, prolactin, FSH, LH, GH, TSH    ?Seizure-like activity- may be provoked in setting of brain mass- check EEG, per neurology okay to monitor off AEDs for now    Fall- PT eval, fall precautions    HTN- c/w amlodipine and Coreg    D/w ACP Gregory

## 2021-07-05 NOTE — ED PROVIDER NOTE - OBJECTIVE STATEMENT
82 y/o F with h/o HTN, deaf and mute BIB EMS after a fall.  Per EMS report pt was found on the floor by her son around 8:30pm.  Son reportedly last saw the patient at 8pm when he put her in bed.  Pt is bedbound at baseline.  Reportedly pt had right arm weakness after the fall and brought to ED as code stroke.    Attempted to evaluate patient with  (ID [id number]), but pt unable to communicate with  and provide any history.

## 2021-07-05 NOTE — ED PROVIDER NOTE - NS ED MD DISPO SPECIAL CONSIDERATION1
Fall Precaution/Legally Blind/Advanced Illness/Hearing Impaired/Geriatrics/Frailty/Low Suspicion of COVID-19

## 2021-07-05 NOTE — CONSULT NOTE ADULT - SUBJECTIVE AND OBJECTIVE BOX
CC:  Patient is a 81y old  Female who presents with a chief complaint of unwitnessed fall  HPI:  81F pmhx of HTN, deafness and muteness from birth BIBEM for unwitnessed fall. Patient poor historian as ED tried to communicate with a sign , but patient unable to communicate/utilize. Collateral information per chart review  unsuccessful attempt to reach Son at [phone] (no answer). As per EMS, the son called EMS because patient had unwitnessed fall and he noticed patient's right arm weakness and tremors. Unable to assess ROS.     In ED, /91, HR85, RR17, T97.5f, o2sat 97% on room air. Code stroke activated. CTH shows no acute intracranial hemorrhage, hyperdense sellar-suprasellar mass measuring up to 1.9 x 1.6 cm may be slightly increased in size from 1.7 x 1.4 cm on 2021. EKG shows NSR with TWI in II, III, aVF, V3-6 which is new from 3/2021. troponin 106-->90-->76, CKMB 8.8-->8.9, -->392, Scr 1.78. Cardiology consult called for elevated troponin and abnormal EKG.          Allergies    No Known Allergies    Intolerances    	    MEDICATIONS  amLODIPine 10 mg oral tablet: 1 tab(s) orally once a day  ·carvedilol 3.125 mg oral tablet: 1 tab(s) orally every 12 hours              PAST MEDICAL & SURGICAL HISTORY:  HTN (hypertension)    Deaf    Mute    No significant past surgical history        FAMILY HISTORY:  No pertinent family history in first degree relatives        SOCIAL HISTORY    Marital Status:   Occupation:   Lives with:     SUBSTANCE USE  Tobacco Usage:  ( ) None ( ) never smoked   ( ) former smoker  ( ) current smoker; Packs per day:   Alcohol Usage: ( ) none  ( ) occasional ( ) 2-3 times a week ( ) daily; Last drink:   Recreational drugs ( ) None      VITAL SIGNS  T(C): 36.4 (21 @ 00:28), Max: 36.4 (21 @ 00:28)  HR: 89 (21 @ 01:19) (85 - 89)  BP: 124/101 (21 @ 01:19) (124/101 - 150/91)  RR: 20 (21 @ 01:19) (17 - 20)  SpO2: 100% (21 @ 01:19) (97% - 100%)  Wt(kg): --    Appearance: NAD, no distress  HEENT: Moist Mucous Membranes, left eye closed, left eye lid skin tag noted  Cardiovascular: Regular rate and rhythm, Normal S1 S2, No JVD, No murmurs  Respiratory: Lungs clear to auscultation. No rales, No rhonchi, No wheezing. No tenderness to palpation  Gastrointestinal:  Soft, Non-tender, + BS  Neurologic: Non-focal, A&Ox3  Skin: Warm and dry, No rashes,  Musculoskeletal: No clubbing, No cyanosis, No edema  Vascular: Peripheral pulses palpable 2+ bilaterally        LABORATORY VALUES	 	                          13.5   5.35  )-----------( 328      ( 2021 01:11 )             42.3           134<L>  |  98  |  26<H>  ----------------------------<  123<H>  5.0   |  20<L>  |  1.78<H>    Ca    11.5<H>      2021 01:11    TPro  7.7  /  Alb  4.0  /  TBili  0.8  /  DBili  x   /  AST  29  /  ALT  8   /  AlkPhos  90      LIVER FUNCTIONS - ( 2021 01:11 )  Alb: 4.0 g/dL / Pro: 7.7 g/dL / ALK PHOS: 90 U/L / ALT: 8 U/L / AST: 29 U/L / GGT: x           Activated Partial Thromboplastin Time: 30.2 sec ( @ 01:11)      CARDIAC MARKERS:  Creatine Kinase, Serum: 370 U/L ( @ 03:00)  troponin 106-->90-->76  CKMB 8.8-->8.9      Blood Gas Venous - Lactate: 1.6 mmol/L ( @ 04:54)  Blood Gas Venous - Lactate: 4.6 mmol/L ( @ 01:11)        Thyroid Stimulating Hormone, Serum: <0.10 uIU/mL ( @ 21:51)      Urinalysis Basic - ( 2021 03:59 )    Color: Yellow / Appearance: Slightly Turbid / S.026 / pH: x  Gluc: x / Ketone: Trace  / Bili: Negative / Urobili: 3 mg/dL   Blood: x / Protein: 30 mg/dL / Nitrite: Negative   Leuk Esterase: Negative / RBC: 1 /HPF / WBC 1 /HPF   Sq Epi: x / Non Sq Epi: 1 /HPF / Bacteria: Negative      CAPILLARY BLOOD GLUCOSE           @ 01:42  229E Corona Virus --  Adenovirus NotDetec  Bordetella pertussis --  Chlamydia pneumoniae NotDetec  Entero/Rhino Virus NotDetec  HKU1 Coronavirus --  hMPV NotDetec  Influenza A NotDetec  Influenza AH1 --  Influenza AH1  --  Influenza AH3 --  Influenza B NotDetec  Mycoplasma pneumoniae NotDetec  NL63 Coronavirus --  OC43 Corornavirus --  Parainfluenza 1 NotDetec  Parainfluenza 2 NotDetec        ECG: NSR with TWI in II, III, aVF, V3-6	    RADIOLOGY:  < from: Xray Chest 1 View- PORTABLE-Urgent (Xray Chest 1 View- PORTABLE-Urgent .) (21 @ 02:09) >  EXAM:  XR CHEST PORTABLE URGENT 1V        PROCEDURE DATE:  2021     ******PRELIMINARY REPORT******    ******PRELIMINARY REPORT******            INTERPRETATION:  No emergent findings. F/u official report.          < end of copied text >  < from: CT Cervical Spine No Cont (21 @ 00:51) >  IMPRESSION:  CT Head:  1.No CT evidence of acute intracranial hemorrhage, subdural collection, acute territorial infarct, hydrocephalus or calvarial fracture.  2.  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 Lobulated mass is again seen extending into the parasellar regions and along the petroclinoid ligaments bilaterally.   Mass is again seen extending along the dorsum of the clivus and into the prepontine space, where it exerts mass effect on the ventral judit.  3.  A subtle defect in the floor of the bony sella is better seen on thin cut images from prior CTA on 2019.  There is a 1.8 cm mucous retention cyst versus polyp or mass in the right sphenoid sinus.  4.  Contrast-enhanced MRI may be obtained for further evaluation.    CT Cervical Spine: No CT evidence of cervical spine fracture or traumatic malalignment.            < end of copied text >  < from: Xray Pelvis AP only (21 @ 02:09) >        EXAM:  RAD PELVIS AP ONLY        PROCEDURE DATE:  2021     ******PRELIMINARY REPORT******    ******PRELIMINARY REPORT******            INTERPRETATION:  No acute fracture. F/u official report.          ******PRELIMINARY REPORT******    ******PRELIMINARY REPORT******            < end of copied text >  	    PREVIOUS DIAGNOSTIC TESTING:    [ ] Echocardiogram:  < from: Transthoracic Echocardiogram (03.15.21 @ 11:12) >  Ejection Fraction (Teicholtz): 61 %  ------------------------------------------------------------------------    CONCLUSIONS:  1. Mitral annular calcification, otherwise normal mitral  valve. Minimal mitral regurgitation.  2. Normal left ventricular internal dimensions and wall  thicknesses.  3. Endocardium not well visualized; grossly normal left  ventricular systolic function.  4. Mild diastolic dysfunction (Stage I).  5. The right ventricle is not well visualized; grossly  normal right ventricular systolic function.  ------------------------------------------------------------------------  Confirmed on  3/15/2021 - 12:51:29 by Aquiles Patel M.D.,  [facility], [place]ANNIE  ------------------------------------------------------------------------    < end of copied text >

## 2021-07-05 NOTE — CONSULT NOTE ADULT - SUBJECTIVE AND OBJECTIVE BOX
HPI:   80 y/o F with h/o HTN, deaf and mute BIB EMS after a fall.  Per EMS report pt was found on the floor by her son around 8:30pm. Initially presented as a CODE STROKE which was cancelled on agreement with ED attending as patient did not meet FAST criteria. Neurology consulted for concern of possible seizure. Collateral per chart review as follows: Son reportedly last saw the patient at 8pm when he put her in bed.  Pt is bedbound at baseline.  Reportedly pt had right arm weakness after the fall per EMS. Of note patient had admission for similar presentation of syncope with RUE weakness followed by LUE weakness in 2021. 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 Patient was unable to get MRI at that time due to potential hardware in ankle. Patient was discharged home with neurosurgery and endocrinology follow up.     ROS: unable to obtain     PAST MEDICAL & SURGICAL HISTORY:  HTN (hypertension)    Deaf    Mute    No significant past surgical history      FAMILY HISTORY:  No pertinent family history in first degree relatives        SOCIAL HISTORY: SOCIAL HISTORY:     Marital Status: (  )   (  ) Single  (  )   (  )      Occupation:      Lives: (  ) alone  (  ) with children   (  ) with spouse  (  ) with parents  (  ) other     Illicit Drug Use: (  ) never used  (  ) other _____     Tobacco Use:  (  ) never smoked  (  ) former smoker  (  ) current smoker  (  ) pack year  (  ) last cigarette date     Alcohol Use:      Sexual History:        MEDICATIONS  Home Medications:      MEDICATIONS  (STANDING):    MEDICATIONS  (PRN):      ALLERGIES/INTOLERANCES:  Allergies  No Known Allergies    Intolerances      OBJECTIVE:  VITALS   Vital Signs Last 24 Hrs  T(C): 36.4 (2021 00:28), Max: 36.4 (2021 00:28)  T(F): 97.5 (2021 00:28), Max: 97.5 (2021 00:28)  HR: 89 (2021 01:19) (85 - 89)  BP: 124/101 (2021 01:19) (124/101 - 150/91)  BP(mean): --  RR: 20 (2021 01:19) (17 - 20)  SpO2: 100% (2021 01:19) (97% - 100%)    PHYSICAL EXAM:  Neurological Exam:  Mental Status: awake, alert, mute, deaf, unable to communicate with patient.     Cranial Nerves: L pupil 4mm and reactive, R pupil surgical 6mm. EOMI, + BTT on L, none on R. No facial asymmetry.  Deaf  Motor: Extremities antigravity x 4  Dysmetria: Unable to assess  Tremor: No resting tremor.  No myoclonus.  Sensation: Grossly intact to LT and painful stimuli x 4  Deep Tendon Reflexes: 1+ bilateral biceps, triceps, brachioradialis, mute knee and ankle  Toes flexor bilaterally  Gait: unable to assess    LABORATORY:  CBC                       13.5   5.35  )-----------( 328      ( 2021 01:11 )             42.3     Chem 07-05    134<L>  |  98  |  26<H>  ----------------------------<  123<H>  5.0   |  20<L>  |  1.78<H>    Ca    11.5<H>      2021 01:11    TPro  7.7  /  Alb  4.0  /  TBili  0.8  /  DBili  x   /  AST  29  /  ALT  8   /  AlkPhos  90  07-05    LFTs LIVER FUNCTIONS - ( 2021 01:11 )  Alb: 4.0 g/dL / Pro: 7.7 g/dL / ALK PHOS: 90 U/L / ALT: 8 U/L / AST: 29 U/L / GGT: x           Coagulopathy PT/INR - ( 2021 01:11 )   PT: 12.3 sec;   INR: 1.08 ratio         PTT - ( 2021 01:11 )  PTT:30.2 sec  Lipid Panel   A1c   Cardiac enzymes     U/A Urinalysis Basic - ( 2021 03:59 )    Color: Yellow / Appearance: Slightly Turbid / S.026 / pH: x  Gluc: x / Ketone: Trace  / Bili: Negative / Urobili: 3 mg/dL   Blood: x / Protein: 30 mg/dL / Nitrite: Negative   Leuk Esterase: Negative / RBC: 1 /HPF / WBC 1 /HPF   Sq Epi: x / Non Sq Epi: 1 /HPF / Bacteria: Negative      CSF  Immunological  Other    STUDIES & IMAGING:  Studies (EKG, EEG, EMG, etc):     Radiology (XR, CT, MR, U/S, TTE/MIQUEL):

## 2021-07-05 NOTE — H&P ADULT - PROBLEM SELECTOR PLAN 6
DVT prophylaxis - Heparin 5000 SC BID - As per son patient with history of HTN was discharged with Coreg 3.125 BID and Amlodipine 10 mg QD in 3/2021, was not taking any medication at home.  - BP in /91 and 124/101  - DASH/TLC diet  - c/w home meds w/ holding parameters  - monitor BP & titrate BP meds PRN. As per son patient with history of HTN was discharged with Coreg 3.125 BID and Amlodipine 10 mg QD in 3/2021, was not taking any medication at home.  - DASH/TLC diet  - C/w amlodipine and Coreg  - Monitor BP & titrate BP meds PRN

## 2021-07-05 NOTE — H&P ADULT - HISTORY OF PRESENT ILLNESS
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 History obtained from son Domingo. Per son, patient was found on the floor last night. States he did not hear her fall to the ground.  Patient had not eaten all day and was in bed all day. Patient was not able to walk yesterday. As per son, patient is deteriorating because patient was calling for him while he was right next to her. Her right arm was weak and unable to  which is new with new tremors present as per son.    In ED, CT head showed No CT evidence of acute intracranial hemorrhage, subdural collection, acute territorial infarct, hydrocephalus or calvarial fracture.  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   - Troponin 106, 90, 76  - EKG new T wave inversion from 3/2021  - Creatinine was 1.78 up from 1.1 from 3/2021  - Neurology and Cardiology were consulted by ED.

## 2021-07-05 NOTE — OCCUPATIONAL THERAPY INITIAL EVALUATION ADULT - LIGHT TOUCH SENSATION, LUE, REHAB EVAL
CARDIOLOGY PROGRESS NOTE    ORIGINAL REASON FOR CONSULT:  Hypertension (09/26/2017)  Attending Provider: Ernie Bentley MD   Date of Admission: 5/11/2020     Grace Caballero is a 38 year old female with the following issues: CARDIAC INVESTIGATIONS/IMAGING   1. Age 38  2. HTN/ Dyslipidemia  3. HFpEF   4. Uncontrolled IDDM (A1c 11.1 06/13/2019)  5. 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 Urinary Incontinence (Dr. Olvera)  8. Dysphagia s/p empiric dilation with EGD (12/24/2018 and 2016?)     NT proBNP: 717 (05/12/20)  10 year ASCVD risk: 12.9%  H2FpEF Score: 3  NYHA FC: NA                                                           1. ECHO (08/24/2018): LVEF 57%, E/e' 8.6  2. LEXISCAN (09/18/2017):  Normal  3. CTA CHEST (09/12/2017): Mild to moderate calcification in LAD.  4. ECG (05/12/20): ST; non-specific ST & T abnormalitiy  5. CT PE (05/11/20): negative PE, small bilateral PEs     Admitted with SOB and chest pain.  Admits to uncontrolled BPs in the 200's for the last few months.    SUBJECTIVE AND OVERNIGHT EVENTS     Patient states that she is feeling better this morning.  Still complains of some SOB.  Denies CP.      TELEMETRY     NSR     CURRENT MEDICATIONS     • insulin glargine  31 Units Subcutaneous Nightly   • amLODIPine  10 mg Oral Daily   • aspirin  81 mg Oral Daily   • carvedilol  25 mg Oral 2 times per day   • pantoprazole  40 mg Oral QAM AC   • tolterodine  4 mg Oral Daily   • sodium chloride (PF)  2 mL Intracatheter 2 times per day   • enoxaparin  40 mg Subcutaneous Daily   • insulin lispro   Subcutaneous TID WC   • furosemide  40 mg Intravenous BID   • aspirin  324 mg Oral Once     • dextrose 5 % infusion         PHYSICAL EXAMINATION     [facility] Extended Vitals - Weight in Kg/Lb 5/12/2020 5/12/2020 5/13/2020 5/13/2020 5/13/2020   /79 - 140/90 - 132/78   Pulse 88 - 92 - 80   Resp 16 - 16 - 18   Temp - 97.6 97.5 - 97.7   Weight kg - - 103.103 kg 103.103 kg -   Weight lb -  - 227 lb 4.8 oz 227 lb 4.8 oz -   Height - - - - -   Height cm - - - - -   BMI - - 39.02 39.02 -   Pulse Ox 93 - 94 - 94   Last Menstrual Period - - - - -   Patient Position Semi-Shah's - Semi-Shah's - Semi-Shah's   BP Location RUE - Right upper extremity - RUE - Right upper extremity - RUE - Right upper extremity   Cuff Size - - - - -       General:  No acute distress.  HEENT:  PERRL, Normocephalic/atraumatic, clear oropharynx.  Neck:  Supple, FROM, No LAP/Thyromegaly.  Trachea central.  No JVP/Carotid bruit.   CVS:  S1, S2 normal.  No M/R/G.  Pulmonary:  Clear to auscultation/percussion.  No Wheeze/Rhonchi/Rales.  Abdomen:  Soft, non-tender/non-distended.  No bruits/peristalsis.  No hepatosplenomegaly.  Extremities:  No cyanosis/clubbing/edema.  Skin:  No rash/palpable nodules.  Neurologic:  Power equal in all extremities; gross sensory exam normal.  Cranial 2-12 intact.    NET I/O     680/-900 (-220)    LABORATORY     Recent Labs   Lab 05/12/20  1315 05/12/20  0429 05/11/20 2000   WBC  --  9.4 8.3   HCT  --  40.6 42.3   HGB  --  13.6 14.5   PLT  --  256 255   SODIUM  --  137 136   POTASSIUM 4.3 3.5 3.7   CHLORIDE  --  103 103   CO2  --  24 22   GLUCOSE  --  344* 391*   BUN  --  8 8   CREATININE  --  0.49* 0.48*   TSH  --  3.348  --    CHOLESTEROL  --  160  --    HDL  --  35*  --    CALCLDL  --  63  --    TRIGLYCERIDE  --  308*  --        ASSESSMENT  AND PLAN     1. Hypertension  -Up titration of medications   3. Dyslipidemia  -Not on statin  4. IDDM    Young female with hypertensive heart disease   Presents with acute diastolic heart failure  Very uncontrolled BPs over the last few months    RECOMMENDATIONS     1. ECHO today - Await results  2. BPs improving  3. Long conversation today regarding compliance and follow-up    Gisel Crump CNP / Dr CASSIE Dowell MD   [phone]  5/13/2020  2:03 PM    Await echocardiogram results  Blood pressures are better    INARDA, have personally taken a history,  performed a physical examination of the patient, reviewed the clinical data, labs, imagings, ecg. I have reviewed and edited the above note as needed. I have personally formulated the clinical impression and recommendations as stated.       NARDA SOTO MD  [phone]           within normal limits

## 2021-07-06 LAB
A1C WITH ESTIMATED AVERAGE GLUCOSE RESULT: 5.1 % — SIGNIFICANT CHANGE UP (ref 4–5.6)
ALBUMIN SERPL ELPH-MCNC: 3.5 G/DL — SIGNIFICANT CHANGE UP (ref 3.3–5)
ALP SERPL-CCNC: 77 U/L — SIGNIFICANT CHANGE UP (ref 40–120)
ALT FLD-CCNC: 11 U/L — SIGNIFICANT CHANGE UP (ref 4–33)
ANION GAP SERPL CALC-SCNC: 15 MMOL/L — HIGH (ref 7–14)
AST SERPL-CCNC: 38 U/L — HIGH (ref 4–32)
BILIRUB SERPL-MCNC: 0.7 MG/DL — SIGNIFICANT CHANGE UP (ref 0.2–1.2)
BUN SERPL-MCNC: 30 MG/DL — HIGH (ref 7–23)
CALCIUM SERPL-MCNC: 10.3 MG/DL — SIGNIFICANT CHANGE UP (ref 8.4–10.5)
CHLORIDE SERPL-SCNC: 102 MMOL/L — SIGNIFICANT CHANGE UP (ref 98–107)
CHOLEST SERPL-MCNC: 227 MG/DL — HIGH
CO2 SERPL-SCNC: 18 MMOL/L — LOW (ref 22–31)
COVID-19 SPIKE DOMAIN AB INTERP: NEGATIVE — SIGNIFICANT CHANGE UP
COVID-19 SPIKE DOMAIN ANTIBODY RESULT: 0.4 U/ML — SIGNIFICANT CHANGE UP
CREAT SERPL-MCNC: 1.61 MG/DL — HIGH (ref 0.5–1.3)
ESTIMATED AVERAGE GLUCOSE: 100 — SIGNIFICANT CHANGE UP
GLUCOSE BLDC GLUCOMTR-MCNC: 232 MG/DL — HIGH (ref 70–99)
GLUCOSE BLDC GLUCOMTR-MCNC: 286 MG/DL — HIGH (ref 70–99)
GLUCOSE BLDC GLUCOMTR-MCNC: 59 MG/DL — LOW (ref 70–99)
GLUCOSE BLDC GLUCOMTR-MCNC: 59 MG/DL — LOW (ref 70–99)
GLUCOSE BLDC GLUCOMTR-MCNC: 61 MG/DL — LOW (ref 70–99)
GLUCOSE BLDC GLUCOMTR-MCNC: 73 MG/DL — SIGNIFICANT CHANGE UP (ref 70–99)
GLUCOSE BLDC GLUCOMTR-MCNC: 77 MG/DL — SIGNIFICANT CHANGE UP (ref 70–99)
GLUCOSE BLDC GLUCOMTR-MCNC: 84 MG/DL — SIGNIFICANT CHANGE UP (ref 70–99)
GLUCOSE SERPL-MCNC: 87 MG/DL — SIGNIFICANT CHANGE UP (ref 70–99)
HCT VFR BLD CALC: 37.6 % — SIGNIFICANT CHANGE UP (ref 34.5–45)
HDLC SERPL-MCNC: 49 MG/DL — LOW
HGB BLD-MCNC: 11.8 G/DL — SIGNIFICANT CHANGE UP (ref 11.5–15.5)
INSULIN SERPL-MCNC: 12.4 UU/ML — SIGNIFICANT CHANGE UP (ref 2.6–24.9)
LIPID PNL WITH DIRECT LDL SERPL: 164 MG/DL — HIGH
MAGNESIUM SERPL-MCNC: 2.1 MG/DL — SIGNIFICANT CHANGE UP (ref 1.6–2.6)
MCHC RBC-ENTMCNC: 28 PG — SIGNIFICANT CHANGE UP (ref 27–34)
MCHC RBC-ENTMCNC: 31.4 GM/DL — LOW (ref 32–36)
MCV RBC AUTO: 89.1 FL — SIGNIFICANT CHANGE UP (ref 80–100)
NON HDL CHOLESTEROL: 178 MG/DL — HIGH
NRBC # BLD: 0 /100 WBCS — SIGNIFICANT CHANGE UP
NRBC # FLD: 0 K/UL — SIGNIFICANT CHANGE UP
PHOSPHATE SERPL-MCNC: 2.8 MG/DL — SIGNIFICANT CHANGE UP (ref 2.5–4.5)
PLATELET # BLD AUTO: 305 K/UL — SIGNIFICANT CHANGE UP (ref 150–400)
POTASSIUM SERPL-MCNC: 4.2 MMOL/L — SIGNIFICANT CHANGE UP (ref 3.5–5.3)
POTASSIUM SERPL-SCNC: 4.2 MMOL/L — SIGNIFICANT CHANGE UP (ref 3.5–5.3)
PROT SERPL-MCNC: 6.6 G/DL — SIGNIFICANT CHANGE UP (ref 6–8.3)
RBC # BLD: 4.22 M/UL — SIGNIFICANT CHANGE UP (ref 3.8–5.2)
RBC # FLD: 15.4 % — HIGH (ref 10.3–14.5)
SARS-COV-2 IGG+IGM SERPL QL IA: 0.4 U/ML — SIGNIFICANT CHANGE UP
SARS-COV-2 IGG+IGM SERPL QL IA: NEGATIVE — SIGNIFICANT CHANGE UP
SODIUM SERPL-SCNC: 135 MMOL/L — SIGNIFICANT CHANGE UP (ref 135–145)
T3 SERPL-MCNC: 47 NG/DL — LOW (ref 80–200)
T4 AB SER-ACNC: 3.61 UG/DL — LOW (ref 5.1–13)
TRIGL SERPL-MCNC: 71 MG/DL — SIGNIFICANT CHANGE UP
VIT D25+D1,25 OH+D1,25 PNL SERPL-MCNC: 79.9 PG/ML — HIGH (ref 19.9–79.3)
WBC # BLD: 5.21 K/UL — SIGNIFICANT CHANGE UP (ref 3.8–10.5)
WBC # FLD AUTO: 5.21 K/UL — SIGNIFICANT CHANGE UP (ref 3.8–10.5)

## 2021-07-06 PROCEDURE — 99232 SBSQ HOSP IP/OBS MODERATE 35: CPT

## 2021-07-06 PROCEDURE — 76770 US EXAM ABDO BACK WALL COMP: CPT | Mod: 26

## 2021-07-06 PROCEDURE — 76536 US EXAM OF HEAD AND NECK: CPT | Mod: 26

## 2021-07-06 PROCEDURE — 99233 SBSQ HOSP IP/OBS HIGH 50: CPT

## 2021-07-06 RX ORDER — GLUCAGON INJECTION, SOLUTION 0.5 MG/.1ML
1 INJECTION, SOLUTION SUBCUTANEOUS ONCE
Refills: 0 | Status: DISCONTINUED | OUTPATIENT
Start: 2021-07-06 | End: 2021-07-15

## 2021-07-06 RX ORDER — DEXTROSE 50 % IN WATER 50 %
25 SYRINGE (ML) INTRAVENOUS ONCE
Refills: 0 | Status: COMPLETED | OUTPATIENT
Start: 2021-07-06 | End: 2021-07-06

## 2021-07-06 RX ORDER — DEXTROSE 50 % IN WATER 50 %
12.5 SYRINGE (ML) INTRAVENOUS ONCE
Refills: 0 | Status: DISCONTINUED | OUTPATIENT
Start: 2021-07-06 | End: 2021-07-15

## 2021-07-06 RX ORDER — DEXTROSE 50 % IN WATER 50 %
25 SYRINGE (ML) INTRAVENOUS ONCE
Refills: 0 | Status: DISCONTINUED | OUTPATIENT
Start: 2021-07-06 | End: 2021-07-15

## 2021-07-06 RX ORDER — DEXTROSE 50 % IN WATER 50 %
15 SYRINGE (ML) INTRAVENOUS ONCE
Refills: 0 | Status: COMPLETED | OUTPATIENT
Start: 2021-07-06 | End: 2021-07-06

## 2021-07-06 RX ORDER — HALOPERIDOL DECANOATE 100 MG/ML
1 INJECTION INTRAMUSCULAR ONCE
Refills: 0 | Status: COMPLETED | OUTPATIENT
Start: 2021-07-06 | End: 2021-07-06

## 2021-07-06 RX ORDER — DEXTROSE 50 % IN WATER 50 %
15 SYRINGE (ML) INTRAVENOUS ONCE
Refills: 0 | Status: DISCONTINUED | OUTPATIENT
Start: 2021-07-06 | End: 2021-07-15

## 2021-07-06 RX ADMIN — HEPARIN SODIUM 5000 UNIT(S): 5000 INJECTION INTRAVENOUS; SUBCUTANEOUS at 17:24

## 2021-07-06 RX ADMIN — Medication 15 GRAM(S): at 00:57

## 2021-07-06 RX ADMIN — CARVEDILOL PHOSPHATE 3.12 MILLIGRAM(S): 80 CAPSULE, EXTENDED RELEASE ORAL at 17:24

## 2021-07-06 RX ADMIN — HALOPERIDOL DECANOATE 1 MILLIGRAM(S): 100 INJECTION INTRAMUSCULAR at 00:58

## 2021-07-06 RX ADMIN — HEPARIN SODIUM 5000 UNIT(S): 5000 INJECTION INTRAVENOUS; SUBCUTANEOUS at 06:03

## 2021-07-06 RX ADMIN — Medication 15 GRAM(S): at 12:37

## 2021-07-06 RX ADMIN — Medication 25 GRAM(S): at 13:04

## 2021-07-06 RX ADMIN — SODIUM CHLORIDE 100 MILLILITER(S): 9 INJECTION, SOLUTION INTRAVENOUS at 00:38

## 2021-07-06 RX ADMIN — Medication 25 GRAM(S): at 00:58

## 2021-07-06 RX ADMIN — SODIUM CHLORIDE 100 MILLILITER(S): 9 INJECTION, SOLUTION INTRAVENOUS at 11:45

## 2021-07-06 NOTE — PROVIDER CONTACT NOTE (HYPOGLYCEMIA EVENT) - NS PROVIDER CONTACT NOTE-TREATMENT-HYPO
Glucose gel 1 tube/Dextrose 50% 25 Grams IV Push
4 oz Fruit Juice (Specify quantity, date/time)/Glucose gel 1 tube/Dextrose 50% 25 Grams IV Push

## 2021-07-06 NOTE — SWALLOW BEDSIDE ASSESSMENT ADULT - SWALLOW EVAL: DIAGNOSIS
1. Patient demonstrated a functional oral management puree, honey thick, nectar thick and thin liquid textures marked by adequate bolus collection transfer and posterior transport. 2. Patient demonstrated a moderate oral dysphagia for solids marked by adequate bolus acceptance with reduced mastication and lingual stasis. 3. Patient demonstrated a judged functional pharyngeal phase of swallow for puree, honey thick, nectar thick and thin liquid textures marked by a timely pharyngeal swallow trigger with hyolaryngeal elevation noted upon digital palpation without evidence of airway penetration/aspiration. 4. Patient demonstrated a severe pharyngeal phase of swallow for solids marked by a volitional bolus hold that was manually removed with spoon utensil. Mild lingual residue noted that cleared with utilization of a liquid wash.

## 2021-07-06 NOTE — PHYSICAL THERAPY INITIAL EVALUATION ADULT - PATIENT PROFILE REVIEW, REHAB EVAL
PT orders received, ambulate with assist. Consulted with Avelina DALEY, pt may participate in PT evaluation./yes
PT orders received, ambulate with assist. Consulted with Avelina DALEY, pt may participate in PT evaluation./yes

## 2021-07-06 NOTE — SWALLOW BEDSIDE ASSESSMENT ADULT - COMMENTS
Consult received and chart reviewed. Patient seen at bedside this AM for an initial assessment of the swallow function, at which time patient was alert. Patient is deaf and blind, however, is responsive to tactile prompting (i.e. spoon utensil/cup presentation to oral cavity). RN reporting PO diet prior to admission is unknown at this time.     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 History obtained from son Domingo. Per son, patient was found on the floor last night. States he did not hear her fall to the ground.  Patient had not eaten all day and was in bed all day. Patient was not able to walk yesterday. As per son, patient is deteriorating because patient was calling for him while he was right next to her. Her right arm was weak and unable to  which is new with new tremors present as per son."    WBC is WFL. Most recent CT of brain revealed "No CT evidence of acute intracranial hemorrhage, subdural collection, acute territorial infarct, hydrocephalus or calvarial fracture. 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 Lobulated mass is again seen extending into the parasellar regions and along the petroclinoid ligaments bilaterally.   Mass is again seen extending along the dorsum of the clivus and into the prepontine space, where it exerts mass effect on the ventral judit. A subtle defect in the floor of the bony sella is better seen on thin cut images from prior CTA on 03/16/2019.  There is a 1.8 cm mucous retention cyst versus polyp or mass in the right sphenoid sinus." Most recent CXR revealed "clear lungs".

## 2021-07-06 NOTE — SWALLOW BEDSIDE ASSESSMENT ADULT - PHARYNGEAL PHASE
Within functional limits Pt did not initiate swallow with solid PO trials. Bolus was removed with spoon utensil

## 2021-07-06 NOTE — PHYSICAL THERAPY INITIAL EVALUATION ADULT - GENERAL OBSERVATIONS, REHAB EVAL
Pt received semisupine, +IV, +telemetry, +pulse oximeter, NAD. Pt agreeable to participate in PT evaluation.
Pt received semisupine, +IV, +telemetry, +pulse oximeter. Pt agreeable to participate in PT evaluation.

## 2021-07-06 NOTE — PROVIDER CONTACT NOTE (HYPOGLYCEMIA EVENT) - NS PROVIDER CONTACT SITUATION-HYPO
Patient accu-check at 12:13 was 59 and at 12:15 was 61. Patient has no history of diabetes. 
patient deaf, mute and blind in R eye. s/p Fall. patient NPO FS q6. Midnight FS was 64

## 2021-07-06 NOTE — PHYSICAL THERAPY INITIAL EVALUATION ADULT - LEVEL OF INDEPENDENCE: STAIR NEGOTIATION, REHAB EVAL
unable to perform 3 - 5 sessions: Luis x 2 forward amb x 10ft with RW 3 - 5 sessions, sit-stand S with RW x 20ft

## 2021-07-06 NOTE — PROVIDER CONTACT NOTE (HYPOGLYCEMIA EVENT) - NS PROVIDER CONTACT BACKGROUND-HYPO
Age: 81y    Gender: Female    POCT Blood Glucose:  286 mg/dL (07-06-21 @ 13:36)  232 mg/dL (07-06-21 @ 13:18)  84 mg/dL (07-06-21 @ 12:50)  73 mg/dL (07-06-21 @ 12:33)  61 mg/dL (07-06-21 @ 12:15)  59 mg/dL (07-06-21 @ 12:13)  75 mg/dL (07-06-21 @ 08:21)  65 mg/dL (07-06-21 @ 08:19)      eMAR:dextrose 40% Gel   15 Gram(s) Oral (07-06-21 @ 00:57)    dextrose 40% Gel   15 Gram(s) Oral (07-06-21 @ 12:37)    dextrose 50% Injectable   25 Gram(s) IV Push (07-06-21 @ 13:04)    dextrose 50% Injectable   25 Gram(s) IV Push (07-06-21 @ 00:58)    
Age: 81y    Gender: Female    POCT Blood Glucose:  162 mg/dL (07-06-21 @ 01:43)  162 mg/dL (07-06-21 @ 01:31)  556 mg/dL (07-06-21 @ 01:21)  65 mg/dL (07-06-21 @ 00:52)  58 mg/dL (07-06-21 @ 00:20)  64 mg/dL (07-06-21 @ 00:18)      eMAR:dextrose 40% Gel   15 Gram(s) Oral (07-06-21 @ 00:57)    dextrose 50% Injectable   25 Gram(s) IV Push (07-06-21 @ 00:58)

## 2021-07-06 NOTE — PROGRESS NOTE ADULT - ATTENDING COMMENTS
Patient seen and examined at bedside. No acute events overnight, no reported seizure like episodes. Sitter at bedside as patient had attempted to get out of bed. She is deaf and mute since birth and is blind in right eye. VEEG pending on our evaluation this AM. No twitching movements of extremities noted. Moving all extremities spontaneously, though favoring the LUE more than RUE.     CT head in ED showed large sellar-supracellar mass (noted on CT head from 3/2021 and now slightly increased in size from 1.7 x1.4 cm to 1.9x1.6cm.   CT C spine with multilevel spondylosis with mild canal stenosis.      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 Sandip paralysis) with RUE rhythmic twitching movements noted on exam 7/5, recommend obtaining VEEG. Holding AEDs at this time.     Recommend MRI brain w/w/o    Neurology will follow.

## 2021-07-06 NOTE — PHYSICAL THERAPY INITIAL EVALUATION ADULT - PERTINENT HX OF CURRENT PROBLEM, REHAB EVAL
Pt is an 81 year old female presenting to [facility] s/p fall. Reportedly pt had right arm weakness after the fall per EMS. Of note patient had admission for similar presentation of syncope with RUE weakness followed by LUE weakness in March 2021. 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 No CT evidence of acute intracranial hemorrhage. 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

## 2021-07-06 NOTE — PROVIDER CONTACT NOTE (HYPOGLYCEMIA EVENT) - NS PROVIDER CONTACT RECOMMEND-HYPO
Monitor fingersticks q15 until blood glucose is greater than 100 twice consecutively. Dr. Combs at bedside, ordered serum insulin level. Will continue to monitor patient and obtain fingersticks q6.

## 2021-07-06 NOTE — PROVIDER CONTACT NOTE (HYPOGLYCEMIA EVENT) - NS PROVIDER CONTACT CONTRIBUTING FACTORS OF EPISODE
Poor oral intake within the last 24 hours/Other (Specify)
Poor oral intake within the last 24 hours/Patient NPO greater than 8 hours/Dysphagia Diet/Previous finger stick less than 100 mg/dL

## 2021-07-06 NOTE — PATIENT PROFILE ADULT - VISION (WITH CORRECTIVE LENSES IF THE PATIENT USUALLY WEARS THEM):
blind in R eye/Severely impaired: cannot locate objects without hearing or touching them or patient nonresponsive.

## 2021-07-06 NOTE — PROVIDER CONTACT NOTE (HYPOGLYCEMIA EVENT) - NS PROVIDER CONTACT ASSESS-HYPO
Patient is Alert, disoriented x4. Patient resting comfortably in bed. Non-verbal indicators of distress absent. 
patient in bed appears in no distress

## 2021-07-07 ENCOUNTER — TRANSCRIPTION ENCOUNTER (OUTPATIENT)
Age: 81
End: 2021-07-07

## 2021-07-07 LAB
ALBUMIN SERPL ELPH-MCNC: 3 G/DL — LOW (ref 3.3–5)
ALP SERPL-CCNC: 62 U/L — SIGNIFICANT CHANGE UP (ref 40–120)
ALT FLD-CCNC: 10 U/L — SIGNIFICANT CHANGE UP (ref 4–33)
ANION GAP SERPL CALC-SCNC: 11 MMOL/L — SIGNIFICANT CHANGE UP (ref 7–14)
AST SERPL-CCNC: 31 U/L — SIGNIFICANT CHANGE UP (ref 4–32)
BILIRUB SERPL-MCNC: 0.4 MG/DL — SIGNIFICANT CHANGE UP (ref 0.2–1.2)
BUN SERPL-MCNC: 17 MG/DL — SIGNIFICANT CHANGE UP (ref 7–23)
CALCIUM SERPL-MCNC: 9.7 MG/DL — SIGNIFICANT CHANGE UP (ref 8.4–10.5)
CHLORIDE SERPL-SCNC: 109 MMOL/L — HIGH (ref 98–107)
CO2 SERPL-SCNC: 20 MMOL/L — LOW (ref 22–31)
CREAT SERPL-MCNC: 1.21 MG/DL — SIGNIFICANT CHANGE UP (ref 0.5–1.3)
GLUCOSE BLDC GLUCOMTR-MCNC: 73 MG/DL — SIGNIFICANT CHANGE UP (ref 70–99)
GLUCOSE BLDC GLUCOMTR-MCNC: 82 MG/DL — SIGNIFICANT CHANGE UP (ref 70–99)
GLUCOSE BLDC GLUCOMTR-MCNC: 87 MG/DL — SIGNIFICANT CHANGE UP (ref 70–99)
GLUCOSE BLDC GLUCOMTR-MCNC: 97 MG/DL — SIGNIFICANT CHANGE UP (ref 70–99)
GLUCOSE BLDC GLUCOMTR-MCNC: 97 MG/DL — SIGNIFICANT CHANGE UP (ref 70–99)
GLUCOSE SERPL-MCNC: 90 MG/DL — SIGNIFICANT CHANGE UP (ref 70–99)
HCT VFR BLD CALC: 34.4 % — LOW (ref 34.5–45)
HGB BLD-MCNC: 10.8 G/DL — LOW (ref 11.5–15.5)
MAGNESIUM SERPL-MCNC: 1.8 MG/DL — SIGNIFICANT CHANGE UP (ref 1.6–2.6)
MCHC RBC-ENTMCNC: 28.9 PG — SIGNIFICANT CHANGE UP (ref 27–34)
MCHC RBC-ENTMCNC: 31.4 GM/DL — LOW (ref 32–36)
MCV RBC AUTO: 92 FL — SIGNIFICANT CHANGE UP (ref 80–100)
NRBC # BLD: 0 /100 WBCS — SIGNIFICANT CHANGE UP
NRBC # FLD: 0 K/UL — SIGNIFICANT CHANGE UP
PHOSPHATE SERPL-MCNC: 2.2 MG/DL — LOW (ref 2.5–4.5)
PLATELET # BLD AUTO: 256 K/UL — SIGNIFICANT CHANGE UP (ref 150–400)
POTASSIUM SERPL-MCNC: 3.7 MMOL/L — SIGNIFICANT CHANGE UP (ref 3.5–5.3)
POTASSIUM SERPL-SCNC: 3.7 MMOL/L — SIGNIFICANT CHANGE UP (ref 3.5–5.3)
PROT SERPL-MCNC: 5.9 G/DL — LOW (ref 6–8.3)
RBC # BLD: 3.74 M/UL — LOW (ref 3.8–5.2)
RBC # FLD: 15.4 % — HIGH (ref 10.3–14.5)
SODIUM SERPL-SCNC: 140 MMOL/L — SIGNIFICANT CHANGE UP (ref 135–145)
WBC # BLD: 5.17 K/UL — SIGNIFICANT CHANGE UP (ref 3.8–10.5)
WBC # FLD AUTO: 5.17 K/UL — SIGNIFICANT CHANGE UP (ref 3.8–10.5)

## 2021-07-07 PROCEDURE — 99232 SBSQ HOSP IP/OBS MODERATE 35: CPT

## 2021-07-07 RX ADMIN — CARVEDILOL PHOSPHATE 3.12 MILLIGRAM(S): 80 CAPSULE, EXTENDED RELEASE ORAL at 05:54

## 2021-07-07 RX ADMIN — SODIUM CHLORIDE 100 MILLILITER(S): 9 INJECTION, SOLUTION INTRAVENOUS at 21:41

## 2021-07-07 RX ADMIN — CARVEDILOL PHOSPHATE 3.12 MILLIGRAM(S): 80 CAPSULE, EXTENDED RELEASE ORAL at 17:52

## 2021-07-07 RX ADMIN — SODIUM CHLORIDE 100 MILLILITER(S): 9 INJECTION, SOLUTION INTRAVENOUS at 18:16

## 2021-07-07 RX ADMIN — SODIUM CHLORIDE 100 MILLILITER(S): 9 INJECTION, SOLUTION INTRAVENOUS at 05:53

## 2021-07-07 RX ADMIN — HEPARIN SODIUM 5000 UNIT(S): 5000 INJECTION INTRAVENOUS; SUBCUTANEOUS at 17:52

## 2021-07-07 RX ADMIN — HEPARIN SODIUM 5000 UNIT(S): 5000 INJECTION INTRAVENOUS; SUBCUTANEOUS at 05:54

## 2021-07-07 RX ADMIN — AMLODIPINE BESYLATE 5 MILLIGRAM(S): 2.5 TABLET ORAL at 05:54

## 2021-07-07 RX ADMIN — Medication 85 MILLIMOLE(S): at 11:39

## 2021-07-07 RX ADMIN — SODIUM CHLORIDE 100 MILLILITER(S): 9 INJECTION, SOLUTION INTRAVENOUS at 10:18

## 2021-07-07 NOTE — DISCHARGE NOTE PROVIDER - CARE PROVIDER_API CALL
Eneida Chaney)  [facility] Neurosurgery  General  [address], [address]  [place] [address]  Phone: [phone]  Fax: [phone]  Follow Up Time:

## 2021-07-07 NOTE — DISCHARGE NOTE PROVIDER - NSDCMRMEDTOKEN_GEN_ALL_CORE_FT
amLODIPine 10 mg oral tablet: 1 tab(s) orally once a day  atorvastatin 40 mg oral tablet: 1 tab(s) orally once a day (at bedtime)  carvedilol 3.125 mg oral tablet: 1 tab(s) orally every 12 hours

## 2021-07-07 NOTE — DISCHARGE NOTE PROVIDER - HOSPITAL COURSE
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 History obtained from son Domingo. Per son, patient was found on the floor last night. States he did not hear her fall to the ground.  Patient had not eaten all day and was in bed all day. Patient was not able to walk yesterday. As per son, patient is deteriorating because patient was calling for him while he was right next to her. Her right arm was weak and unable to  which is new with new tremors present as per son.   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 History obtained from son Domingo. Per son, patient was found on the floor last night. States he did not hear her fall to the ground.  Patient had not eaten all day and was in bed all day. Patient was not able to walk yesterday. As per son, patient is deteriorating because patient was calling for him while he was right next to her. Her right arm was weak and unable to  which is new with new tremors present as per son.   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 History obtained from son Domingo. Per son, patient was found on the floor last night. States he did not hear her fall to the ground.  Patient had not eaten all day and was in bed all day. Patient was not able to walk yesterday. As per son, patient is deteriorating because patient was calling for him while he was right next to her. Her right arm was weak and unable to  which is new with new tremors present as per son.    Hospital Course    Pt was found with a  Brain mass,   - CTH negative for acute pathology. 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 History obtained from son Domingo. Per son, patient was found on the floor last night. States he did not hear her fall to the ground.  Patient had not eaten all day and was in bed all day. Patient was not able to walk yesterday. As per son, patient is deteriorating because patient was calling for him while he was right next to her. Her right arm was weak and unable to  which is new with new tremors present as per son.    Hospital Course    Acute Encephalopathy   - CTH negative for acute pathology. 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   Pt was recommended to have an MRI, However Due to HX of Hardware in The Past, Pt was unable to have study, as per discussion with Neurosurgery, Pt Can follow up with Dr Chaney as outpt   - US thyroid with no discrete nodule to suggest parathyroid adenoma   - Neurology consulted , Plan for outpatient Follow up   HOspital Course was also Notable EMMANUEL and Hypercalcemia which have resolved with IVF  Pt Was seen by PT, Recommended for Rehab  Case discussed with attending Dr. Combs, Pt is stable for transfer to Rehab on 7/15.

## 2021-07-08 DIAGNOSIS — E22.1 HYPERPROLACTINEMIA: ICD-10-CM

## 2021-07-08 LAB
ANION GAP SERPL CALC-SCNC: 9 MMOL/L — SIGNIFICANT CHANGE UP (ref 7–14)
BUN SERPL-MCNC: 10 MG/DL — SIGNIFICANT CHANGE UP (ref 7–23)
CALCIUM SERPL-MCNC: 9.3 MG/DL — SIGNIFICANT CHANGE UP (ref 8.4–10.5)
CHLORIDE SERPL-SCNC: 109 MMOL/L — HIGH (ref 98–107)
CO2 SERPL-SCNC: 20 MMOL/L — LOW (ref 22–31)
CREAT SERPL-MCNC: 1.11 MG/DL — SIGNIFICANT CHANGE UP (ref 0.5–1.3)
GLUCOSE BLDC GLUCOMTR-MCNC: 81 MG/DL — SIGNIFICANT CHANGE UP (ref 70–99)
GLUCOSE BLDC GLUCOMTR-MCNC: 92 MG/DL — SIGNIFICANT CHANGE UP (ref 70–99)
GLUCOSE BLDC GLUCOMTR-MCNC: 95 MG/DL — SIGNIFICANT CHANGE UP (ref 70–99)
GLUCOSE BLDC GLUCOMTR-MCNC: 96 MG/DL — SIGNIFICANT CHANGE UP (ref 70–99)
GLUCOSE SERPL-MCNC: 101 MG/DL — HIGH (ref 70–99)
HCT VFR BLD CALC: 32.4 % — LOW (ref 34.5–45)
HGB BLD-MCNC: 10.3 G/DL — LOW (ref 11.5–15.5)
MAGNESIUM SERPL-MCNC: 1.5 MG/DL — LOW (ref 1.6–2.6)
MCHC RBC-ENTMCNC: 28.9 PG — SIGNIFICANT CHANGE UP (ref 27–34)
MCHC RBC-ENTMCNC: 31.8 GM/DL — LOW (ref 32–36)
MCV RBC AUTO: 91 FL — SIGNIFICANT CHANGE UP (ref 80–100)
NRBC # BLD: 0 /100 WBCS — SIGNIFICANT CHANGE UP
NRBC # FLD: 0 K/UL — SIGNIFICANT CHANGE UP
PHOSPHATE SERPL-MCNC: 3.1 MG/DL — SIGNIFICANT CHANGE UP (ref 2.5–4.5)
PLATELET # BLD AUTO: 301 K/UL — SIGNIFICANT CHANGE UP (ref 150–400)
POTASSIUM SERPL-MCNC: 3.7 MMOL/L — SIGNIFICANT CHANGE UP (ref 3.5–5.3)
POTASSIUM SERPL-SCNC: 3.7 MMOL/L — SIGNIFICANT CHANGE UP (ref 3.5–5.3)
RBC # BLD: 3.56 M/UL — LOW (ref 3.8–5.2)
RBC # FLD: 15.5 % — HIGH (ref 10.3–14.5)
SODIUM SERPL-SCNC: 138 MMOL/L — SIGNIFICANT CHANGE UP (ref 135–145)
WBC # BLD: 5 K/UL — SIGNIFICANT CHANGE UP (ref 3.8–10.5)
WBC # FLD AUTO: 5 K/UL — SIGNIFICANT CHANGE UP (ref 3.8–10.5)

## 2021-07-08 RX ORDER — MAGNESIUM SULFATE 500 MG/ML
2 VIAL (ML) INJECTION ONCE
Refills: 0 | Status: COMPLETED | OUTPATIENT
Start: 2021-07-08 | End: 2021-07-08

## 2021-07-08 RX ADMIN — HEPARIN SODIUM 5000 UNIT(S): 5000 INJECTION INTRAVENOUS; SUBCUTANEOUS at 17:59

## 2021-07-08 RX ADMIN — SODIUM CHLORIDE 100 MILLILITER(S): 9 INJECTION, SOLUTION INTRAVENOUS at 03:26

## 2021-07-08 RX ADMIN — CARVEDILOL PHOSPHATE 3.12 MILLIGRAM(S): 80 CAPSULE, EXTENDED RELEASE ORAL at 06:19

## 2021-07-08 RX ADMIN — Medication 50 GRAM(S): at 09:48

## 2021-07-08 RX ADMIN — SODIUM CHLORIDE 100 MILLILITER(S): 9 INJECTION, SOLUTION INTRAVENOUS at 06:20

## 2021-07-08 RX ADMIN — AMLODIPINE BESYLATE 5 MILLIGRAM(S): 2.5 TABLET ORAL at 06:19

## 2021-07-08 RX ADMIN — HEPARIN SODIUM 5000 UNIT(S): 5000 INJECTION INTRAVENOUS; SUBCUTANEOUS at 06:19

## 2021-07-08 RX ADMIN — CARVEDILOL PHOSPHATE 3.12 MILLIGRAM(S): 80 CAPSULE, EXTENDED RELEASE ORAL at 17:59

## 2021-07-08 NOTE — CONSULT NOTE ADULT - CONSULT REASON
Elevated troponin level and abnormal EKG
Seizure
Elevated troponin level and abnormal EKG
brain mass
Hyperprolactinemia

## 2021-07-08 NOTE — CONSULT NOTE ADULT - SUBJECTIVE AND OBJECTIVE BOX
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 History obtained from son Domingo. Per son, patient was found on the floor last night. States he did not hear her fall to the ground.  Patient had not eaten all day and was in bed all day. Patient was not able to walk yesterday. As per son, patient is deteriorating because patient was calling for him while he was right next to her. Her right arm was weak and unable to  which is new with new tremors present as per son.    In ED, CT head showed No CT evidence of acute intracranial hemorrhage, subdural collection, acute territorial infarct, hydrocephalus or calvarial fracture.  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   - Troponin 106, 90, 76  - EKG new T wave inversion from 3/2021  - Creatinine was 1.78 up from 1.1 from 3/2021  - Neurology and Cardiology were consulted by ED.     (05 Jul 2021 07:53)  Patient has apparently has no history of diabetes not on any hypoglycemic agents.    PAST MEDICAL & SURGICAL HISTORY:  HTN (hypertension)    Deaf    Mute    History of ankle surgery        FAMILY HISTORY:      Social History:    Outpatient Medications:    MEDICATIONS  (STANDING):  amLODIPine   Tablet 5 milliGRAM(s) Oral daily  carvedilol 3.125 milliGRAM(s) Oral every 12 hours  dextrose 40% Gel 15 Gram(s) Oral once  dextrose 50% Injectable 25 Gram(s) IV Push once  dextrose 50% Injectable 12.5 Gram(s) IV Push once  dextrose 50% Injectable 25 Gram(s) IV Push once  glucagon  Injectable 1 milliGRAM(s) IntraMuscular once  heparin   Injectable 5000 Unit(s) SubCutaneous every 12 hours    MEDICATIONS  (PRN):      Allergies    No Known Allergies    Intolerances      Review of Systems:  UABLE TO OBTAIN    ALL OTHER SYSTEMS REVIEWED AND NEGATIVE        PHYSICAL EXAM:  VITALS: T(C): 37.2 (07-08-21 @ 06:06)  T(F): 99 (07-08-21 @ 06:06), Max: 99 (07-08-21 @ 06:06)  HR: 66 (07-08-21 @ 06:06) (66 - 68)  BP: 170/57 (07-08-21 @ 06:06) (129/55 - 170/57)  RR:  (16 - 18)  SpO2:  (94% - 100%)  Wt(kg): --  GENERAL: NAD, well-groomed, well-developed  EYES: No proptosis, no lid lag  HEENT:  Atraumatic, Normocephalic  THYROID: Normal size, no palpable nodules  RESPIRATORY: Clear to auscultation bilaterally; No rales, rhonchi, wheezing  CARDIOVASCULAR: Si S2, No murmurs;  GI: Soft, non distended, normal bowel sounds  SKIN: Dry, intact, No rashes or lesions  MUSCULOSKELETAL: Has BL lower extremity edema.  NEURO:  no tremor, sensation decreased in feet BL,    POCT Blood Glucose.: 96 mg/dL (07-08-21 @ 06:17)  POCT Blood Glucose.: 97 mg/dL (07-07-21 @ 23:28)  POCT Blood Glucose.: 87 mg/dL (07-07-21 @ 17:49)  POCT Blood Glucose.: 97 mg/dL (07-07-21 @ 11:59)  POCT Blood Glucose.: 82 mg/dL (07-07-21 @ 06:06)  POCT Blood Glucose.: 73 mg/dL (07-07-21 @ 00:22)  POCT Blood Glucose.: 77 mg/dL (07-06-21 @ 17:53)  POCT Blood Glucose.: 59 mg/dL (07-06-21 @ 17:51)  POCT Blood Glucose.: 286 mg/dL (07-06-21 @ 13:36)  POCT Blood Glucose.: 232 mg/dL (07-06-21 @ 13:18)  POCT Blood Glucose.: 84 mg/dL (07-06-21 @ 12:50)  POCT Blood Glucose.: 73 mg/dL (07-06-21 @ 12:33)  POCT Blood Glucose.: 61 mg/dL (07-06-21 @ 12:15)  POCT Blood Glucose.: 59 mg/dL (07-06-21 @ 12:13)  POCT Blood Glucose.: 75 mg/dL (07-06-21 @ 08:21)  POCT Blood Glucose.: 65 mg/dL (07-06-21 @ 08:19)  POCT Blood Glucose.: 75 mg/dL (07-06-21 @ 05:52)  POCT Blood Glucose.: 162 mg/dL (07-06-21 @ 01:43)  POCT Blood Glucose.: 162 mg/dL (07-06-21 @ 01:31)  POCT Blood Glucose.: 556 mg/dL (07-06-21 @ 01:21)  POCT Blood Glucose.: 65 mg/dL (07-06-21 @ 00:52)  POCT Blood Glucose.: 58 mg/dL (07-06-21 @ 00:20)  POCT Blood Glucose.: 64 mg/dL (07-06-21 @ 00:18)                            10.3   5.00  )-----------( 301      ( 08 Jul 2021 05:38 )             32.4       07-08    138  |  109<H>  |  10  ----------------------------<  101<H>  3.7   |  20<L>  |  1.11    EGFR if : 53<L>  EGFR if non : 46<L>    Ca    9.3      07-08  Mg     1.50     07-08  Phos  3.1     07-08    TPro  5.9<L>  /  Alb  3.0<L>  /  TBili  0.4  /  DBili  x   /  AST  31  /  ALT  10  /  AlkPhos  62  07-07      Thyroid Function Tests:  07-06 @ 07:06 TSH -- FreeT4 -- T3 47 Anti TPO -- Anti Thyroglobulin Ab -- TSI --  07-05 @ 10:30 TSH 4.58 FreeT4 -- T3 -- Anti TPO -- Anti Thyroglobulin Ab -- TSI --          07-06 Chol 227<H> Direct LDL -- LDL calculated 164<H> HDL 49<L> Trig 71    Radiology:

## 2021-07-09 LAB
GLUCOSE BLDC GLUCOMTR-MCNC: 77 MG/DL — SIGNIFICANT CHANGE UP (ref 70–99)
GLUCOSE BLDC GLUCOMTR-MCNC: 83 MG/DL — SIGNIFICANT CHANGE UP (ref 70–99)
GLUCOSE BLDC GLUCOMTR-MCNC: 84 MG/DL — SIGNIFICANT CHANGE UP (ref 70–99)

## 2021-07-09 RX ADMIN — CARVEDILOL PHOSPHATE 3.12 MILLIGRAM(S): 80 CAPSULE, EXTENDED RELEASE ORAL at 04:56

## 2021-07-09 RX ADMIN — CARVEDILOL PHOSPHATE 3.12 MILLIGRAM(S): 80 CAPSULE, EXTENDED RELEASE ORAL at 17:50

## 2021-07-09 RX ADMIN — AMLODIPINE BESYLATE 5 MILLIGRAM(S): 2.5 TABLET ORAL at 04:56

## 2021-07-09 RX ADMIN — HEPARIN SODIUM 5000 UNIT(S): 5000 INJECTION INTRAVENOUS; SUBCUTANEOUS at 17:49

## 2021-07-09 RX ADMIN — HEPARIN SODIUM 5000 UNIT(S): 5000 INJECTION INTRAVENOUS; SUBCUTANEOUS at 04:56

## 2021-07-09 NOTE — EEG REPORT - NS EEG TEXT BOX
MIGUEL ADHIKARI [id number]     Study Date: 		07-09-21    --------------------------------------------------------------------------------------------------  History:  CC/ HPI Patient is a 81y old  Female who presents with a chief complaint of Fall (09 Jul 2021 16:00)    MEDICATIONS  (STANDING):  amLODIPine   Tablet 5 milliGRAM(s) Oral daily  carvedilol 3.125 milliGRAM(s) Oral every 12 hours  heparin   Injectable 5000 Unit(s) SubCutaneous every 12 hours    --------------------------------------------------------------------------------------------------  Study Interpretation:    [[[Abbreviation Key:  PDR=alpha rhythm/posterior dominant rhythm. A-P=anterior posterior gradient.  Amplitude: ‘very low’:<20; ‘low’:20-50; ‘medium’:; ‘high’:>200uV.  Persistence for periodic/rhythmic patterns (% of epoch) ‘rare’:<1%; ‘occasional’:1-10%; ‘frequent’:10-50%; ‘abundant’:50-90%; ‘continuous’:>90%.  Persistence for sporadic discharges: ‘rare’:<1/hr; ‘occasional’:1/min-1/hr; ‘frequent’:>1/min; ‘abundant’:>1/10 sec.  GRDA=generalized rhythmic delta activity, LRDA=lateralized rhythmic delta activity, TIRDA=temporal intermittent rhythmic delta activity, FIRDA=frontal intermittent rhythmic activity. LPD=PLED=lateralized periodic discharges, GPD=generalized periodic discharges, BiPDs=BiPLEDs=bilateral independent periodic epileptiform discharges, SIRPID=stimulus induced rhythmic, periodic, or ictal appearing discharges.  Modifiers: +F=with fast component, +S=with spike component, +R=with rhythmic component.  S-B=burst suppression pattern.  Max=maximal. N1-drowsy, N2-stage II sleep, N3-slow wave sleep.  HV=hyperventilation, PS=photic stimulation]]]    FINDINGS:  The background was continuous, spontaneously variable and reactive.  During wakefulness, the posteriorly dominant rhythm was poorly modulated over the left, up to 8hz 30uV over the right.    There was diffuse irregular theta and delta activity present.  Left hemispheric irregular theta and delta activity was present, max frontotemporally    Sleep Background:  Stage II sleep transients were not recorded.    Epileptiform Activity:   No interictal epileptiform discharges were present.    Events:  No clinical events were recorded.  No seizures were recorded.    Activation Procedures:   -Hyperventilation was not performed.    -Photic stimulation was not performed.    Artifacts:  Intermittent myogenic and external motion artifacts were noted.    ECG:  The heart rate on single channel ECG at baseline was predominantly near BPM = 60-66  -----------------------------------------------------------------------------------------------------    EEG Classification / Summary:  Abnormal EEG study  Left hemispheric irregular theta and delta activity was present, max frontotemporally  There was diffuse irregular theta and delta activity present.    -----------------------------------------------------------------------------------------------------    Clinical Impression:  Left hemispheric focal cerebral dysfunction, max frontotemporally, correlate radiographically.  Superimposed mild diffuse or multifocal cerebral dysfunction.  There were no epileptiform abnormalities recorded.      -------------------------------------------------------------------------------------------------------  [facility] EEG Reading Room Ph#: [phone]  Epilepsy Answering Service after 5PM and before 8:30AM: Ph#: [phone]    Abdullahi Oconnor M.D.   of Neurology, [facility]

## 2021-07-10 LAB
GLUCOSE BLDC GLUCOMTR-MCNC: 72 MG/DL — SIGNIFICANT CHANGE UP (ref 70–99)
GLUCOSE BLDC GLUCOMTR-MCNC: 82 MG/DL — SIGNIFICANT CHANGE UP (ref 70–99)
GLUCOSE BLDC GLUCOMTR-MCNC: 88 MG/DL — SIGNIFICANT CHANGE UP (ref 70–99)
GLUCOSE BLDC GLUCOMTR-MCNC: 88 MG/DL — SIGNIFICANT CHANGE UP (ref 70–99)
SARS-COV-2 RNA SPEC QL NAA+PROBE: SIGNIFICANT CHANGE UP

## 2021-07-10 RX ORDER — AMLODIPINE BESYLATE 2.5 MG/1
5 TABLET ORAL ONCE
Refills: 0 | Status: COMPLETED | OUTPATIENT
Start: 2021-07-10 | End: 2021-07-10

## 2021-07-10 RX ORDER — AMLODIPINE BESYLATE 2.5 MG/1
10 TABLET ORAL DAILY
Refills: 0 | Status: DISCONTINUED | OUTPATIENT
Start: 2021-07-11 | End: 2021-07-15

## 2021-07-10 RX ADMIN — CARVEDILOL PHOSPHATE 3.12 MILLIGRAM(S): 80 CAPSULE, EXTENDED RELEASE ORAL at 18:15

## 2021-07-10 RX ADMIN — CARVEDILOL PHOSPHATE 3.12 MILLIGRAM(S): 80 CAPSULE, EXTENDED RELEASE ORAL at 05:47

## 2021-07-10 RX ADMIN — AMLODIPINE BESYLATE 5 MILLIGRAM(S): 2.5 TABLET ORAL at 15:16

## 2021-07-10 RX ADMIN — HEPARIN SODIUM 5000 UNIT(S): 5000 INJECTION INTRAVENOUS; SUBCUTANEOUS at 05:47

## 2021-07-10 RX ADMIN — HEPARIN SODIUM 5000 UNIT(S): 5000 INJECTION INTRAVENOUS; SUBCUTANEOUS at 18:15

## 2021-07-10 RX ADMIN — AMLODIPINE BESYLATE 5 MILLIGRAM(S): 2.5 TABLET ORAL at 05:47

## 2021-07-11 LAB
GLUCOSE BLDC GLUCOMTR-MCNC: 101 MG/DL — HIGH (ref 70–99)
GLUCOSE BLDC GLUCOMTR-MCNC: 73 MG/DL — SIGNIFICANT CHANGE UP (ref 70–99)
GLUCOSE BLDC GLUCOMTR-MCNC: 81 MG/DL — SIGNIFICANT CHANGE UP (ref 70–99)
GLUCOSE BLDC GLUCOMTR-MCNC: 88 MG/DL — SIGNIFICANT CHANGE UP (ref 70–99)
GLUCOSE BLDC GLUCOMTR-MCNC: 92 MG/DL — SIGNIFICANT CHANGE UP (ref 70–99)

## 2021-07-11 RX ADMIN — HEPARIN SODIUM 5000 UNIT(S): 5000 INJECTION INTRAVENOUS; SUBCUTANEOUS at 18:33

## 2021-07-11 RX ADMIN — CARVEDILOL PHOSPHATE 3.12 MILLIGRAM(S): 80 CAPSULE, EXTENDED RELEASE ORAL at 05:19

## 2021-07-11 RX ADMIN — CARVEDILOL PHOSPHATE 3.12 MILLIGRAM(S): 80 CAPSULE, EXTENDED RELEASE ORAL at 18:33

## 2021-07-11 RX ADMIN — HEPARIN SODIUM 5000 UNIT(S): 5000 INJECTION INTRAVENOUS; SUBCUTANEOUS at 05:21

## 2021-07-11 RX ADMIN — AMLODIPINE BESYLATE 10 MILLIGRAM(S): 2.5 TABLET ORAL at 05:18

## 2021-07-12 LAB
ANION GAP SERPL CALC-SCNC: 10 MMOL/L — SIGNIFICANT CHANGE UP (ref 7–14)
BUN SERPL-MCNC: 17 MG/DL — SIGNIFICANT CHANGE UP (ref 7–23)
CALCIUM SERPL-MCNC: 10 MG/DL — SIGNIFICANT CHANGE UP (ref 8.4–10.5)
CHLORIDE SERPL-SCNC: 101 MMOL/L — SIGNIFICANT CHANGE UP (ref 98–107)
CO2 SERPL-SCNC: 22 MMOL/L — SIGNIFICANT CHANGE UP (ref 22–31)
CREAT SERPL-MCNC: 1.13 MG/DL — SIGNIFICANT CHANGE UP (ref 0.5–1.3)
GLUCOSE BLDC GLUCOMTR-MCNC: 127 MG/DL — HIGH (ref 70–99)
GLUCOSE BLDC GLUCOMTR-MCNC: 77 MG/DL — SIGNIFICANT CHANGE UP (ref 70–99)
GLUCOSE BLDC GLUCOMTR-MCNC: 85 MG/DL — SIGNIFICANT CHANGE UP (ref 70–99)
GLUCOSE BLDC GLUCOMTR-MCNC: 92 MG/DL — SIGNIFICANT CHANGE UP (ref 70–99)
GLUCOSE SERPL-MCNC: 89 MG/DL — SIGNIFICANT CHANGE UP (ref 70–99)
HCT VFR BLD CALC: 35.6 % — SIGNIFICANT CHANGE UP (ref 34.5–45)
HGB BLD-MCNC: 11.5 G/DL — SIGNIFICANT CHANGE UP (ref 11.5–15.5)
MCHC RBC-ENTMCNC: 28.7 PG — SIGNIFICANT CHANGE UP (ref 27–34)
MCHC RBC-ENTMCNC: 32.3 GM/DL — SIGNIFICANT CHANGE UP (ref 32–36)
MCV RBC AUTO: 88.8 FL — SIGNIFICANT CHANGE UP (ref 80–100)
NRBC # BLD: 0 /100 WBCS — SIGNIFICANT CHANGE UP
NRBC # FLD: 0 K/UL — SIGNIFICANT CHANGE UP
PLATELET # BLD AUTO: 345 K/UL — SIGNIFICANT CHANGE UP (ref 150–400)
POTASSIUM SERPL-MCNC: 3.9 MMOL/L — SIGNIFICANT CHANGE UP (ref 3.5–5.3)
POTASSIUM SERPL-SCNC: 3.9 MMOL/L — SIGNIFICANT CHANGE UP (ref 3.5–5.3)
PTH RELATED PROT SERPL-MCNC: <2 PMOL/L — SIGNIFICANT CHANGE UP
RBC # BLD: 4.01 M/UL — SIGNIFICANT CHANGE UP (ref 3.8–5.2)
RBC # FLD: 15.1 % — HIGH (ref 10.3–14.5)
SODIUM SERPL-SCNC: 133 MMOL/L — LOW (ref 135–145)
WBC # BLD: 6.03 K/UL — SIGNIFICANT CHANGE UP (ref 3.8–10.5)
WBC # FLD AUTO: 6.03 K/UL — SIGNIFICANT CHANGE UP (ref 3.8–10.5)

## 2021-07-12 RX ORDER — ATORVASTATIN CALCIUM 80 MG/1
40 TABLET, FILM COATED ORAL AT BEDTIME
Refills: 0 | Status: DISCONTINUED | OUTPATIENT
Start: 2021-07-12 | End: 2021-07-15

## 2021-07-12 RX ADMIN — CARVEDILOL PHOSPHATE 3.12 MILLIGRAM(S): 80 CAPSULE, EXTENDED RELEASE ORAL at 06:13

## 2021-07-12 RX ADMIN — AMLODIPINE BESYLATE 10 MILLIGRAM(S): 2.5 TABLET ORAL at 06:13

## 2021-07-12 RX ADMIN — HEPARIN SODIUM 5000 UNIT(S): 5000 INJECTION INTRAVENOUS; SUBCUTANEOUS at 06:13

## 2021-07-12 RX ADMIN — ATORVASTATIN CALCIUM 40 MILLIGRAM(S): 80 TABLET, FILM COATED ORAL at 22:07

## 2021-07-12 RX ADMIN — CARVEDILOL PHOSPHATE 3.12 MILLIGRAM(S): 80 CAPSULE, EXTENDED RELEASE ORAL at 18:19

## 2021-07-12 RX ADMIN — HEPARIN SODIUM 5000 UNIT(S): 5000 INJECTION INTRAVENOUS; SUBCUTANEOUS at 18:20

## 2021-07-12 NOTE — PROVIDER CONTACT NOTE (OTHER) - RECOMMENDATIONS
Provider notified
made PA aware about tele box. Charge RN overnight made aware and let ADNknow there are no more tele boxes on the floor.
provider notified
Give blood pressure medications. Lillie Lawton made aware.

## 2021-07-12 NOTE — CHART NOTE - NSCHARTNOTEFT_GEN_A_CORE
Lipid panel - , , HDL 49. Spoke with Endocrinology, recommend start Atorvastatin 40 mg QD    [facility]  [id number]

## 2021-07-12 NOTE — PROVIDER CONTACT NOTE (OTHER) - ACTION/TREATMENT ORDERED:
Provider notified. No further orders at this time.
Day RN will try to get another telemetry box and escalate to manager.
one time dose of amlodipine given as per order.
provider notified. No further orders at this time.
Give morning amlodipine and coreg and continue to monitor blood pressure.
No actions ordered at this time.
Provider will assess and awaiting new orders
provider notified. carvedilol administered as per order. no further interventions ordered at this time.
No actions ordered at this time. Continue to monitor.

## 2021-07-12 NOTE — PROVIDER CONTACT NOTE (OTHER) - DATE AND TIME:
10-Jul-2021 05:40
10-Jul-2021 08:50
10-Jul-2021 18:10
10-Jul-2021 14:57
12-Jul-2021 12:42
06-Jul-2021 05:57
10-Jul-2021 22:30
11-Jul-2021 14:58
05-Jul-2021 09:40

## 2021-07-12 NOTE — PROVIDER CONTACT NOTE (OTHER) - NAME OF MD/NP/PA/DO NOTIFIED:
Giuliana PA ACP
Fatmata Peterson
[facility] [place] [id number]
Gregory Luis
[place] Luis
Eneida Buenrostro
[place] Luis
Lillie Murray
[place], [place]

## 2021-07-12 NOTE — PROVIDER CONTACT NOTE (OTHER) - ASSESSMENT
patient awake and alert.
patient awake and alert. no signs and symptoms of chest pain or discomfort.
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 No distress noted.
patient appears to be relaxed and awake and alert. no signs and symptoms of pain.
a/ox0, Pt is increasingly agitated, flailing in the stretcher, is mute, deaf and blind baseline. V/S stable (see flow sheet)
patient appears to be relaxed and awake and alert. no signs and symptoms of pain.
patient appears to be relaxed and awake and alert. no signs and symptoms of pain.
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 No distress noted. On enhanced supervision and resting in bed. No tele events overnight.
patient appears to be resting comfortably. asymptomatic.

## 2021-07-12 NOTE — PROVIDER CONTACT NOTE (OTHER) - REASON
vital signs
Patient Blood Pressure 171/65.
Blood pressure
Pt is increasingly more agitated
patient tele box reading on and off
blood pressure
Patient hypertensive with blood pressure 198/73.
Right pupil fixed
high blood pressure

## 2021-07-12 NOTE — PROVIDER CONTACT NOTE (OTHER) - BACKGROUND
81 year old.  hx acute renal failure, deaf, blind, mute
admitted for fall
81 year old.  hx acute renal failure, deaf, blind, mute
patient came in with abnormal EKG s/p fall
81 year old female Hx HTN, acute renal failure
81 year old.  hx acute renal failure, deaf, blind, mute
patient came in with abnormal EKG s/p fall
81 year old female HX HTN, blindness, mute and deaf
81 year old female. admit with acute renal failure

## 2021-07-12 NOTE — PROVIDER CONTACT NOTE (OTHER) - SITUATION
Patient blood pressure 198/73.
blood pressure 180/ 72
Patient blood pressure 171/65.
Pt is increasingly confused, is mute, deaf and blind baseline. V/S stable (see flow sheet)
right pupil fixed and 4mm.
patient telemetry box is reading on and off. Provider and day RN made aware. patient in NSR on tele monitor when reading. There are no more tele boxes on the floor.
/55  HR 64
/70   HR 64
blood pressure 173/73

## 2021-07-13 LAB
GLUCOSE BLDC GLUCOMTR-MCNC: 102 MG/DL — HIGH (ref 70–99)
GLUCOSE BLDC GLUCOMTR-MCNC: 72 MG/DL — SIGNIFICANT CHANGE UP (ref 70–99)
GLUCOSE BLDC GLUCOMTR-MCNC: 76 MG/DL — SIGNIFICANT CHANGE UP (ref 70–99)
GLUCOSE BLDC GLUCOMTR-MCNC: 80 MG/DL — SIGNIFICANT CHANGE UP (ref 70–99)
GLUCOSE BLDC GLUCOMTR-MCNC: 84 MG/DL — SIGNIFICANT CHANGE UP (ref 70–99)
SARS-COV-2 RNA SPEC QL NAA+PROBE: SIGNIFICANT CHANGE UP

## 2021-07-13 RX ORDER — CARVEDILOL PHOSPHATE 80 MG/1
1 CAPSULE, EXTENDED RELEASE ORAL
Qty: 0 | Refills: 0 | DISCHARGE
Start: 2021-07-13

## 2021-07-13 RX ORDER — AMLODIPINE BESYLATE 2.5 MG/1
1 TABLET ORAL
Qty: 0 | Refills: 0 | DISCHARGE
Start: 2021-07-13

## 2021-07-13 RX ORDER — ATORVASTATIN CALCIUM 80 MG/1
1 TABLET, FILM COATED ORAL
Qty: 0 | Refills: 0 | DISCHARGE
Start: 2021-07-13

## 2021-07-13 RX ADMIN — CARVEDILOL PHOSPHATE 3.12 MILLIGRAM(S): 80 CAPSULE, EXTENDED RELEASE ORAL at 06:19

## 2021-07-13 RX ADMIN — CARVEDILOL PHOSPHATE 3.12 MILLIGRAM(S): 80 CAPSULE, EXTENDED RELEASE ORAL at 17:25

## 2021-07-13 RX ADMIN — HEPARIN SODIUM 5000 UNIT(S): 5000 INJECTION INTRAVENOUS; SUBCUTANEOUS at 06:20

## 2021-07-13 RX ADMIN — HEPARIN SODIUM 5000 UNIT(S): 5000 INJECTION INTRAVENOUS; SUBCUTANEOUS at 17:25

## 2021-07-13 RX ADMIN — AMLODIPINE BESYLATE 10 MILLIGRAM(S): 2.5 TABLET ORAL at 06:20

## 2021-07-13 RX ADMIN — ATORVASTATIN CALCIUM 40 MILLIGRAM(S): 80 TABLET, FILM COATED ORAL at 23:18

## 2021-07-13 NOTE — DIETITIAN INITIAL EVALUATION ADULT. - ADD RECOMMEND
1. Encourage & assist Pt with meals; Monitor PO diet tolerance; Honor food preferences;       2. Add Multivitamins with minerals 1 tab daily for micronutrient coverage;          3. Monitor labs, hydration status;

## 2021-07-13 NOTE — DIETITIAN INITIAL EVALUATION ADULT. - OTHER INFO
Pt 82 yo female with PMHx of chronic back pain, Pt s/p fall at home - per chart review.      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 Case discussed with nurse. Per nurse, Pt did not eat much for breakfast this morning. Per PCA, Pt ate better yesterday. Will recommend to add PO supplement: Ensure Enlive - 2x daily to optimize nutrition. Pt needs assistance with meals, Pt needs to be fed, reported.    Unable to discuss diet or weight history @ present. No report of nausea, vomiting or diarrhea @ this time. No report of pressure injury @ present. RDN remains available, nurse made aware.

## 2021-07-14 LAB
ANION GAP SERPL CALC-SCNC: 12 MMOL/L — SIGNIFICANT CHANGE UP (ref 7–14)
BASOPHILS # BLD AUTO: 0.02 K/UL — SIGNIFICANT CHANGE UP (ref 0–0.2)
BASOPHILS NFR BLD AUTO: 0.4 % — SIGNIFICANT CHANGE UP (ref 0–2)
BUN SERPL-MCNC: 19 MG/DL — SIGNIFICANT CHANGE UP (ref 7–23)
CALCIUM SERPL-MCNC: 10.8 MG/DL — HIGH (ref 8.4–10.5)
CHLORIDE SERPL-SCNC: 100 MMOL/L — SIGNIFICANT CHANGE UP (ref 98–107)
CO2 SERPL-SCNC: 23 MMOL/L — SIGNIFICANT CHANGE UP (ref 22–31)
CREAT SERPL-MCNC: 1.15 MG/DL — SIGNIFICANT CHANGE UP (ref 0.5–1.3)
EOSINOPHIL # BLD AUTO: 0.24 K/UL — SIGNIFICANT CHANGE UP (ref 0–0.5)
EOSINOPHIL NFR BLD AUTO: 4.5 % — SIGNIFICANT CHANGE UP (ref 0–6)
GLUCOSE BLDC GLUCOMTR-MCNC: 111 MG/DL — HIGH (ref 70–99)
GLUCOSE BLDC GLUCOMTR-MCNC: 80 MG/DL — SIGNIFICANT CHANGE UP (ref 70–99)
GLUCOSE SERPL-MCNC: 86 MG/DL — SIGNIFICANT CHANGE UP (ref 70–99)
HCT VFR BLD CALC: 42.2 % — SIGNIFICANT CHANGE UP (ref 34.5–45)
HGB BLD-MCNC: 13.4 G/DL — SIGNIFICANT CHANGE UP (ref 11.5–15.5)
IANC: 1.45 K/UL — LOW (ref 1.5–8.5)
IMM GRANULOCYTES NFR BLD AUTO: 0 % — SIGNIFICANT CHANGE UP (ref 0–1.5)
LYMPHOCYTES # BLD AUTO: 3.14 K/UL — SIGNIFICANT CHANGE UP (ref 1–3.3)
LYMPHOCYTES # BLD AUTO: 59.5 % — HIGH (ref 13–44)
MAGNESIUM SERPL-MCNC: 1.8 MG/DL — SIGNIFICANT CHANGE UP (ref 1.6–2.6)
MCHC RBC-ENTMCNC: 28.8 PG — SIGNIFICANT CHANGE UP (ref 27–34)
MCHC RBC-ENTMCNC: 31.8 GM/DL — LOW (ref 32–36)
MCV RBC AUTO: 90.6 FL — SIGNIFICANT CHANGE UP (ref 80–100)
MONOCYTES # BLD AUTO: 0.43 K/UL — SIGNIFICANT CHANGE UP (ref 0–0.9)
MONOCYTES NFR BLD AUTO: 8.1 % — SIGNIFICANT CHANGE UP (ref 2–14)
NEUTROPHILS # BLD AUTO: 1.45 K/UL — LOW (ref 1.8–7.4)
NEUTROPHILS NFR BLD AUTO: 27.5 % — LOW (ref 43–77)
NRBC # BLD: 0 /100 WBCS — SIGNIFICANT CHANGE UP
NRBC # FLD: 0 K/UL — SIGNIFICANT CHANGE UP
PHOSPHATE SERPL-MCNC: 2.7 MG/DL — SIGNIFICANT CHANGE UP (ref 2.5–4.5)
PLATELET # BLD AUTO: 404 K/UL — HIGH (ref 150–400)
POTASSIUM SERPL-MCNC: 4.3 MMOL/L — SIGNIFICANT CHANGE UP (ref 3.5–5.3)
POTASSIUM SERPL-SCNC: 4.3 MMOL/L — SIGNIFICANT CHANGE UP (ref 3.5–5.3)
RBC # BLD: 4.66 M/UL — SIGNIFICANT CHANGE UP (ref 3.8–5.2)
RBC # FLD: 15.1 % — HIGH (ref 10.3–14.5)
SODIUM SERPL-SCNC: 135 MMOL/L — SIGNIFICANT CHANGE UP (ref 135–145)
WBC # BLD: 5.28 K/UL — SIGNIFICANT CHANGE UP (ref 3.8–10.5)
WBC # FLD AUTO: 5.28 K/UL — SIGNIFICANT CHANGE UP (ref 3.8–10.5)

## 2021-07-14 RX ADMIN — CARVEDILOL PHOSPHATE 3.12 MILLIGRAM(S): 80 CAPSULE, EXTENDED RELEASE ORAL at 06:32

## 2021-07-14 RX ADMIN — AMLODIPINE BESYLATE 10 MILLIGRAM(S): 2.5 TABLET ORAL at 06:32

## 2021-07-14 RX ADMIN — HEPARIN SODIUM 5000 UNIT(S): 5000 INJECTION INTRAVENOUS; SUBCUTANEOUS at 17:25

## 2021-07-14 RX ADMIN — HEPARIN SODIUM 5000 UNIT(S): 5000 INJECTION INTRAVENOUS; SUBCUTANEOUS at 06:32

## 2021-07-14 RX ADMIN — ATORVASTATIN CALCIUM 40 MILLIGRAM(S): 80 TABLET, FILM COATED ORAL at 21:50

## 2021-07-14 RX ADMIN — CARVEDILOL PHOSPHATE 3.12 MILLIGRAM(S): 80 CAPSULE, EXTENDED RELEASE ORAL at 17:24

## 2021-07-15 ENCOUNTER — TRANSCRIPTION ENCOUNTER (OUTPATIENT)
Age: 81
End: 2021-07-15

## 2021-07-15 VITALS
HEART RATE: 63 BPM | SYSTOLIC BLOOD PRESSURE: 145 MMHG | RESPIRATION RATE: 17 BRPM | DIASTOLIC BLOOD PRESSURE: 60 MMHG | OXYGEN SATURATION: 100 % | TEMPERATURE: 98 F

## 2021-07-15 LAB — GLUCOSE BLDC GLUCOMTR-MCNC: 100 MG/DL — HIGH (ref 70–99)

## 2021-07-15 RX ADMIN — AMLODIPINE BESYLATE 10 MILLIGRAM(S): 2.5 TABLET ORAL at 05:09

## 2021-07-15 RX ADMIN — HEPARIN SODIUM 5000 UNIT(S): 5000 INJECTION INTRAVENOUS; SUBCUTANEOUS at 05:09

## 2021-07-15 RX ADMIN — CARVEDILOL PHOSPHATE 3.12 MILLIGRAM(S): 80 CAPSULE, EXTENDED RELEASE ORAL at 05:09

## 2021-07-15 NOTE — PROGRESS NOTE ADULT - PROVIDER SPECIALTY LIST ADULT
Cardiology
Endocrinology
Endocrinology
Internal Medicine
Endocrinology
Endocrinology
Neurology
Cardiology
Endocrinology
Endocrinology
Internal Medicine
Endocrinology
Internal Medicine

## 2021-07-15 NOTE — PROGRESS NOTE ADULT - PROBLEM SELECTOR PLAN 7
continue heparin SQ

## 2021-07-15 NOTE — PROGRESS NOTE ADULT - PROBLEM SELECTOR PLAN 4
fall precautions  PT evaluation

## 2021-07-15 NOTE — PROGRESS NOTE ADULT - PROBLEM SELECTOR PLAN 1
resolved  creatinine 1.2 today  will continue to monitor
MRI brain, may consider cabergoline depending on overall plan of care.
will continue to monitor  has CKD stage 3 at baseline
MRI brain, may consider cabergoline depending on overall plan of care.
has CKD stage 3 at baseline
MRI brain, may consider cabergoline depending on overall plan of care.
MRI brain, may consider cabergoline depending on overall plan of care.
will continue to monitor  has CKD stage 3 at baseline
stable  has CKD stage 3 at baseline
MRI brain, may consider cabergoline depending on overall plan of care.
resolved  creatinine baseline 1.6  will continue to monitor
will continue to monitor  has CKD stage 3 at baseline
will continue to monitor  has CKD stage 3 at baseline
resolved  will continue to monitor  has CKD stage 3 at baseline
resolved  creatinine 1.1  will continue to monitor  has CKD stage 3 at baseline

## 2021-07-15 NOTE — PROGRESS NOTE ADULT - PROBLEM SELECTOR PROBLEM 5
Abnormal EKG

## 2021-07-15 NOTE — PROGRESS NOTE ADULT - PROBLEM SELECTOR PROBLEM 1
EMMANUEL (acute kidney injury)
EMMANUEL (acute kidney injury)
Hyperprolactinemia
Hyperprolactinemia
EMMANUEL (acute kidney injury)
Hyperprolactinemia
EMMANUEL (acute kidney injury)
EMMANUEL (acute kidney injury)
Hyperprolactinemia
Hyperprolactinemia
EMMANUEL (acute kidney injury)

## 2021-07-15 NOTE — PROGRESS NOTE ADULT - PROBLEM SELECTOR PLAN 6
continue amlodipine and Coreg  continue to monitor

## 2021-07-15 NOTE — PROGRESS NOTE ADULT - PROBLEM SELECTOR PROBLEM 2
Brain mass
Brain mass
Hypercalcemia
Brain mass
Hypercalcemia
Brain mass
Hypercalcemia
Brain mass

## 2021-07-15 NOTE — PROGRESS NOTE ADULT - ASSESSMENT
Assessment  Hypoglycemia: 81y Female with no history of DM patient was not on any hypoglycemic agents likely had hypoglycemia due to poor PO intake, eating meals, FS within acceptable range, appears comfortable.  Pituitary lesion: Being worked up, has elevated prolactin levels could be due to stalk effect or prolactinoma, MRI brain pending, neurosurgery on board.  HLD : On statin.  Hypercalcemia: Due to primary hyperparathyroidism, calcium improving.              Kam Pyle MD  Cell: [phone]  Office: [phone]            
  Assessment  Hypoglycemia: 81y Female with no history of DM patient was not on any hypoglycemic agents likely had hypoglycemia due to poor PO intake, eating meals, sugar improved, appears comfortable.  Pituitary lesion: Being worked up, has elevated prolactin levels could be due to stalk effect or prolactinoma, MRI brain pending, neurosurgery on board.  HLD : On statin.  Hypercalcemia: Due to primary hyperparathyroidism, calcium improving.              Kam Pyle MD  Cell: [phone]  Office: [phone]            
  Assessment  Hypoglycemia: 81y Female with no history of DM patient was not on any hypoglycemic agents likely had hypoglycemia due to poor PO intake, eating partial meals sugars improving.  Pituitary lesion: Being worked up, has elevated prolactin levels could be due to stalk effect or prolactinoma, MRI brain pending, neurosurgery on board.  Hypercalcemia: Due to primary hyperparathyroidism, calcium improving.              Kam Pyle MD  Cell: [phone]  Office: [phone]            
 80 y/o F with h/o HTN, deaf and mute BIB EMS after a fall.  Per EMS report pt was found on the floor by her son around 8:30pm. Initially presented as a CODE STROKE which was cancelled on agreement with ED attending as patient did not meet [facility] criteria. Neurology consulted for concern of possible seizure. Collateral per chart review as follows: Son reportedly last saw the patient at 8pm when he put her in bed.  Pt is bedbound at baseline.  Reportedly pt had right arm weakness after the fall per EMS. Of note patient had admission for similar presentation of syncope with RUE weakness followed by LUE weakness in March 2021. 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 Patient was unable to get MRI at that time due to potential hardware in ankle. Patient was discharged home with neurosurgery and endocrinology follow up. Neuro exam limited as patient deaf and mute. CTH as below:     CT Head:  1. No CT evidence of acute intracranial hemorrhage, subdural collection, acute territorial infarct, hydrocephalus or calvarial fracture.  2. 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 Lobulated mass is again seen extending into the parasellar regions and along the petroclinoid ligaments bilaterally. Mass is again seen extending along the dorsum of the clivus and into the prepontine space, where it exerts mass effect on the ventral judit.  3. A subtle defect in the floor of the bony sella is better seen on thin cut images from prior CTA on 03/16/2019. There is a 1.8 cm mucous retention cyst versus polyp or mass in the right sphenoid sinus.  4. Contrast-enhanced MRI may be obtained for further evaluation.    Impression   Fall of unclear etiology, mechanical vs syncope vs seizure.     Recommendation  - rEEG  - MRI brain w/wo contrast if possible   - Neurosurgical evaluation  - PT/OT  - No indication for AEDs at this time    Case to be discussed with neurology attending Dr. Knowles 
  Assessment  Hypoglycemia: 81y Female with no history of DM patient was not on any hypoglycemic agents likely had hypoglycemia due to poor PO intake, eating meals sugars improving, no overnight events, comfortable.  Pituitary lesion: Being worked up, has elevated prolactin levels could be due to stalk effect or prolactinoma, MRI brain pending, neurosurgery on board.  Hypercalcemia: Due to primary hyperparathyroidism, calcium improving.              Kam Pyle MD  Cell: [phone]  Office: [phone]            
Patient is an 82 yo woman with HTN, deafness/muteness from birth, BIBEM for unwitnessed fall.   Cardiology consult called for elevated troponin and abnormal EKG.   Doubt ACS. Normal recent echocardiogram from 3/2021.  No significant events noted on telemetry.  Remains hemodynamically stable.   Will arrange for pharmacologic NST as part of her ischemic evaluation.  In meantime, continue Coreg, add ASA and atorvastatin until further clarification of ischemic status.    
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
Patient is an 82 yo woman with HTN, deafness/muteness from birth, BIBEM for unwitnessed fall.   Cardiology consult called for elevated troponin and abnormal EKG.   Doubt ACS. Normal recent echocardiogram from 3/2021.  With new presenting symptoms, reassessment with echocardiogram pending.  Given current clinical status, doubt any further cardiac testing at this time would greatly affect overall cardiac management.  Remains hemodynamically stable.  Ongoing neurologic workup for presenting symptoms, brain mass.  
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
  Assessment  Hypoglycemia: 81y Female with no history of DM patient was not on any hypoglycemic agents likely had hypoglycemia due to poor PO intake, sugars improving, stable.  Pituitary lesion: Being worked up, has elevated prolactin levels could be due to stalk effect or prolactinoma, MRI brain pending, neurosurgery on board.  HLD : On statin.  Hypercalcemia: Due to primary hyperparathyroidism, calcium improving.              Kam Pyle MD  Cell: [phone]  Office: [phone]            
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
  Assessment  Hypoglycemia: 81y Female with no history of DM patient was not on any hypoglycemic agents likely had hypoglycemia due to poor PO intake, eating meals FS within acceptable range, appears comfortable.  Pituitary lesion: Being worked up, has elevated prolactin levels could be due to stalk effect or prolactinoma, MRI brain pending, neurosurgery on board.  Hypercalcemia: Due to primary hyperparathyroidism, calcium improving.              Kam Pyle MD  Cell: [phone]  Office: [phone]            
  Assessment  Hypoglycemia: 81y Female with no history of DM patient was not on any hypoglycemic agents likely had hypoglycemia due to poor PO intake, eating partial meals sugars improving, no overnight events, comfortable.  Pituitary lesion: Being worked up, has elevated prolactin levels could be due to stalk effect or prolactinoma, MRI brain pending, neurosurgery on board.  Hypercalcemia: Due to primary hyperparathyroidism, calcium improving.              Kam Pyle MD  Cell: [phone]  Office: [phone]            
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

## 2021-07-15 NOTE — PROGRESS NOTE ADULT - PROBLEM SELECTOR PLAN 3
Continue  monitoring, FU primary team recommendations. .
Continue  monitoring, FU primary team recommendations. .
resolved   no intervention required at this time
Continue  monitoring, FU primary team recommendations. .
resolved   no intervention required at this time
Continue  monitoring, FU primary team recommendations. .
resolved   likely secondary to dehydration on admission  however patient may have primary hyperparathyroidism as PTH was elevated in the face of hypercalcemia  will continue to monitor
Continue  monitoring, FU primary team recommendations. .
mild   no intervention required at this time
Continue  monitoring, FU primary team recommendations. .
resolved   no intervention required at this time
resolved   no intervention required at this time
resolved   likely secondary to dehydration on admission  also has primary hyperparathyroidism  endo help appreciated   no intervention required at this time
Continue  monitoring, FU primary team recommendations. .
resolved   likely secondary to dehydration on admission  also has primary hyperparathyroidism  endo help appreciated   no intervention required at this time
likely secondary to dehydration on admission  however patient may have primary hyperparathyroidism as PTH was elevated in the face of hypercalcemia  now normal  will continue to monitor
mild   no intervention required at this time

## 2021-07-15 NOTE — DISCHARGE NOTE NURSING/CASE MANAGEMENT/SOCIAL WORK - PATIENT PORTAL LINK FT
You can access the FollowMyHealth Patient Portal offered by [facility] by registering at the following website: [email or web address]. By joining [employer] [facility]’s FollowMyHealth portal, you will also be able to view your health information using other applications (apps) compatible with our system.

## 2021-07-15 NOTE — PROGRESS NOTE ADULT - PROBLEM SELECTOR PROBLEM 3
Fall at home
Hypercalcemia
Fall at home
Fall at home
Hypercalcemia

## 2021-07-15 NOTE — PROGRESS NOTE ADULT - PROBLEM SELECTOR PLAN 5
continue to follow cardiology recommendations  echocardiogram as outpatient
continue to follow cardiology recommendations  I feel NST may not be possible in this patient  will d/w cardiology
continue to follow cardiology recommendations  echocardiogram as outpatient
continue to follow cardiology recommendations  ? NST
continue to follow cardiology recommendations  echocardiogram as outpatient
continue to follow cardiology recommendations  echocardiogram as outpatient

## 2021-07-15 NOTE — PROGRESS NOTE ADULT - SUBJECTIVE AND OBJECTIVE BOX
Chief complaint    Patient is a 81y old  Female who presents with a chief complaint of Fall (14 Jul 2021 15:23)   Review of systems  Patient in bed, appears comfortable.    Labs and Fingersticks  CAPILLARY BLOOD GLUCOSE      POCT Blood Glucose.: 100 mg/dL (15 Jul 2021 09:02)  POCT Blood Glucose.: 111 mg/dL (14 Jul 2021 21:14)  POCT Blood Glucose.: 80 mg/dL (14 Jul 2021 17:51)      Anion Gap, Serum: 12 (07-14 @ 07:42)      Calcium, Total Serum: 10.8 *H* (07-14 @ 07:42)          07-14    135  |  100  |  19  ----------------------------<  86  4.3   |  23  |  1.15    Ca    10.8<H>      14 Jul 2021 07:42  Phos  2.7     07-14  Mg     1.80     07-14                          13.4   5.28  )-----------( 404      ( 14 Jul 2021 07:42 )             42.2     Medications  MEDICATIONS  (STANDING):  amLODIPine   Tablet 10 milliGRAM(s) Oral daily  atorvastatin 40 milliGRAM(s) Oral at bedtime  carvedilol 3.125 milliGRAM(s) Oral every 12 hours  dextrose 40% Gel 15 Gram(s) Oral once  dextrose 50% Injectable 25 Gram(s) IV Push once  dextrose 50% Injectable 12.5 Gram(s) IV Push once  dextrose 50% Injectable 25 Gram(s) IV Push once  glucagon  Injectable 1 milliGRAM(s) IntraMuscular once  heparin   Injectable 5000 Unit(s) SubCutaneous every 12 hours      Physical Exam  General: Patient comfortable in bed  Vital Signs Last 12 Hrs  T(F): 97.9 (07-15-21 @ 05:03), Max: 97.9 (07-15-21 @ 05:03)  HR: 63 (07-15-21 @ 05:03) (63 - 63)  BP: 145/60 (07-15-21 @ 05:03) (145/60 - 145/60)  BP(mean): --  RR: 17 (07-15-21 @ 05:03) (17 - 17)  SpO2: 100% (07-15-21 @ 05:03) (100% - 100%)  Neck: No palpable thyroid nodules.  CVS: S1S2, No murmurs  Respiratory: No wheezing, no crepitations  GI: Abdomen soft, bowel sounds positive  Musculoskeletal:  edema lower extremities.     Diagnostics          
  Chief complaint    Patient is a 81y old  Female who presents with a chief complaint of Fall (13 Jul 2021 10:40)   Review of systems  Patient in bed, appears comfortable.    Labs and Fingersticks  CAPILLARY BLOOD GLUCOSE      POCT Blood Glucose.: 72 mg/dL (13 Jul 2021 12:54)  POCT Blood Glucose.: 80 mg/dL (13 Jul 2021 09:14)  POCT Blood Glucose.: 102 mg/dL (13 Jul 2021 06:25)  POCT Blood Glucose.: 127 mg/dL (12 Jul 2021 23:52)  POCT Blood Glucose.: 77 mg/dL (12 Jul 2021 18:13)      Anion Gap, Serum: 10 (07-12 @ 09:58)      Calcium, Total Serum: 10.0 (07-12 @ 09:58)          07-12    133<L>  |  101  |  17  ----------------------------<  89  3.9   |  22  |  1.13    Ca    10.0      12 Jul 2021 09:58                          11.5   6.03  )-----------( 345      ( 12 Jul 2021 09:58 )             35.6     Medications  MEDICATIONS  (STANDING):  amLODIPine   Tablet 10 milliGRAM(s) Oral daily  atorvastatin 40 milliGRAM(s) Oral at bedtime  carvedilol 3.125 milliGRAM(s) Oral every 12 hours  dextrose 40% Gel 15 Gram(s) Oral once  dextrose 50% Injectable 25 Gram(s) IV Push once  dextrose 50% Injectable 12.5 Gram(s) IV Push once  dextrose 50% Injectable 25 Gram(s) IV Push once  glucagon  Injectable 1 milliGRAM(s) IntraMuscular once  heparin   Injectable 5000 Unit(s) SubCutaneous every 12 hours      Physical Exam  General: Patient comfortable in bed  Vital Signs Last 12 Hrs  T(F): 97.4 (07-13-21 @ 12:33), Max: 97.5 (07-13-21 @ 06:18)  HR: 94 (07-13-21 @ 12:33) (73 - 94)  BP: 106/61 (07-13-21 @ 12:33) (106/61 - 120/56)  BP(mean): --  RR: 18 (07-13-21 @ 12:33) (17 - 18)  SpO2: 100% (07-13-21 @ 12:33) (98% - 100%)  Neck: No palpable thyroid nodules.  CVS: S1S2, No murmurs  Respiratory: No wheezing, no crepitations  GI: Abdomen soft, bowel sounds positive  Musculoskeletal:  edema lower extremities.     Diagnostics          
Cardiology/Vascular Medicine Attending Note     No new complaints  Telemetry: SR 60s bpm    Vital Signs Last 24 Hrs  T(C): 36.3 (06 Jul 2021 06:22), Max: 36.7 (05 Jul 2021 15:43)  T(F): 97.3 (06 Jul 2021 06:22), Max: 98 (05 Jul 2021 15:43)  HR: 62 (06 Jul 2021 06:22) (62 - 76)  BP: 124/79 (06 Jul 2021 06:22) (124/79 - 144/72)  BP(mean): --  RR: 18 (06 Jul 2021 06:22) (16 - 18)  SpO2: 95% (06 Jul 2021 06:22) (95% - 98%)    Appearance: NAD  HEENT: NO JVD  Cardiovascular: Regular S1S2, 2/6 SM  Respiratory: Decreased breath sounds bilaterally  Ext: No edema    MEDICATIONS  (STANDING):  amLODIPine   Tablet 5 milliGRAM(s) Oral daily  carvedilol 3.125 milliGRAM(s) Oral every 12 hours  dextrose 40% Gel 15 Gram(s) Oral once  dextrose 5% + sodium chloride 0.9%. 1000 milliLiter(s) (100 mL/Hr) IV Continuous <Continuous>  dextrose 50% Injectable 25 Gram(s) IV Push once  dextrose 50% Injectable 12.5 Gram(s) IV Push once  dextrose 50% Injectable 25 Gram(s) IV Push once  glucagon  Injectable 1 milliGRAM(s) IntraMuscular once  heparin   Injectable 5000 Unit(s) SubCutaneous every 12 hours    LABORATORY VALUES	 	                          11.8   5.21  )-----------( 305      ( 06 Jul 2021 07:06 )             37.6     07-06    135  |  102  |  30<H>  ----------------------------<  87  4.2   |  18<L>  |  1.61<H>    Ca    10.3      06 Jul 2021 07:06  Phos  2.8     07-06  Mg     2.10     07-06    TPro  6.6  /  Alb  3.5  /  TBili  0.7  /  DBili  x   /  AST  38<H>  /  ALT  11  /  AlkPhos  77  07-06    PT/INR - ( 05 Jul 2021 01:11 )   PT: 12.3 sec;   INR: 1.08 ratio    PTT - ( 05 Jul 2021 01:11 )  PTT:30.2 sec      < from: Transthoracic Echocardiogram (03.15.21 @ 11:12) >    Patient name: MIGUEL ADHIKARI  YOB: 1940   Age: 81 (F)   MR#: [id number]  Study Date: 3/15/2021  Location: [id number]: Janessa Whitfield [facility]  Study quality: Technically Fair  Referring Physician: Zara Oconnor MD  Blood Pressure:173/53 mmHg  Height: 163 cm  Weight: 55 kg  BSA: 1.6 m2  ------------------------------------------------------------------------  PROCEDURE: Transthoracic echocardiogram with 2-D, M-Mode  and complete spectral and color flow Doppler.  INDICATION: Syncope and collapse (R55)  ------------------------------------------------------------------------  DIMENSIONS:  Dimensions:     Normal Values:  LA:     3.4 cm    2.0 - 4.0 cm  Ao:     2.4 cm    2.0 - 3.8 cm  SEPTUM: 0.7 cm    0.6 - 1.2 cm  PWT:    0.8 cm    0.6 - 1.1 cm  LVIDd:  4.0 cm    3.0 - 5.6 cm  LVIDs:  2.7 cm    1.8 - 4.0 cm  Derived Variables:  LVMI: 54 g/m2  RWT: 0.40  Fractional short: 33 %  Ejection Fraction (Aishwaryaoltz): 61 %  ------------------------------------------------------------------------  OBSERVATIONS:  Mitral Valve: Mitral annular calcification, otherwise  normal mitral valve. Minimal mitral regurgitation.  Aortic Root: Normal aortic root.  Aortic Valve: Aortic valve leaflet morphology not well  visualized.  Left Atrium: Normal left atrium.  LA volume index = 21  cc/m2.  Left Ventricle: Endocardium not well visualized; grossly  normal left ventricular systolic function. Normal left  ventricular internal dimensions and wall thicknesses. Mild  diastolic dysfunction (Stage I).  Right Heart: Normal right atrium. The right ventricle is  not well visualized; grossly normal right ventricular  systolic function. Normal tricuspid valve. Minimal  tricuspid regurgitation. Normal pulmonic valve.  Pericardium/PleuraNormal pericardium with no pericardial  effusion.  ------------------------------------------------------------------------  CONCLUSIONS:  1. Mitral annular calcification, otherwise normal mitral  valve. Minimal mitral regurgitation.  2. Normal left ventricular internal dimensions and wall  thicknesses.  3. Endocardium not well visualized; grossly normal left  ventricular systolic function.  4. Mild diastolic dysfunction (Stage I).  5. The right ventricle is not well visualized; grossly  normal right ventricular systolic function.  ------------------------------------------------------------------------  Confirmed on  3/15/2021 - 12:51:29 by Aquiles Patel M.D.,  [facility], [place]  ------------------------------------------------------------------------    < end of copied text >          
HPI:   80 y/o F with h/o HTN, deaf and mute BIB EMS after a fall.  Per EMS report pt was found on the floor by her son around 8:30pm. Initially presented as a CODE STROKE which was cancelled on agreement with ED attending as patient did not meet FAST criteria. Neurology consulted for concern of possible seizure. Collateral per chart review as follows: Son reportedly last saw the patient at 8pm when he put her in bed.  Pt is bedbound at baseline.  Reportedly pt had right arm weakness after the fall per EMS. Of note patient had admission for similar presentation of syncope with RUE weakness followed by LUE weakness in March 2021. 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 Patient was unable to get MRI at that time due to potential hardware in ankle. Patient was discharged home with neurosurgery and endocrinology follow up.     Patient was seen and examined at bedside this morning. Unable to obtain history due to patient's mental status.    ROS: unable to obtain     PAST MEDICAL & SURGICAL HISTORY:  HTN (hypertension)    Deaf    Mute    No significant past surgical history      FAMILY HISTORY:  No pertinent family history in first degree relatives          MEDICATIONS  Home Medications:      MEDICATIONS  (STANDING):    MEDICATIONS  (PRN):      ALLERGIES/INTOLERANCES:  Allergies  No Known Allergies    Intolerances      OBJECTIVE:  VITALS   ICU Vital Signs Last 24 Hrs  T(C): 36.3 (06 Jul 2021 06:22), Max: 36.7 (05 Jul 2021 15:43)  T(F): 97.3 (06 Jul 2021 06:22), Max: 98 (05 Jul 2021 15:43)  HR: 62 (06 Jul 2021 06:22) (62 - 76)  BP: 124/79 (06 Jul 2021 06:22) (124/79 - 144/72)  BP(mean): --  ABP: --  ABP(mean): --  RR: 18 (06 Jul 2021 06:22) (16 - 18)  SpO2: 95% (06 Jul 2021 06:22) (95% - 98%)      PHYSICAL EXAM:  Neurological Exam: Exam limited due to patient's mental status. Patient was seen pointing off to her left in the distance.  Mental Status: awake, alert, mute, deaf, unable to communicate with patient.     Cranial Nerves: Right eye was closed with a lesion seen on the medial aspect. No facial asymmetry.  Deaf  Motor: Unable to perform full assessment. Favored moving left extremity over right.   Dysmetria: Unable to assess  Tremor: No resting tremor.  No myoclonus.  Sensation: Unable to assess  Gait: unable to assess    LABORATORY:  CBC Full  -  ( 06 Jul 2021 07:06 )  WBC Count : 5.21 K/uL  RBC Count : 4.22 M/uL  Hemoglobin : 11.8 g/dL  Hematocrit : 37.6 %  Platelet Count - Automated : 305 K/uL  Mean Cell Volume : 89.1 fL  Mean Cell Hemoglobin : 28.0 pg  Mean Cell Hemoglobin Concentration : 31.4 gm/dL  Auto Neutrophil # : x  Auto Lymphocyte # : x  Auto Monocyte # : x  Auto Eosinophil # : x  Auto Basophil # : x  Auto Neutrophil % : x  Auto Lymphocyte % : x  Auto Monocyte % : x  Auto Eosinophil % : x  Auto Basophil % : x    07-06    135  |  102  |  30<H>  ----------------------------<  87  4.2   |  18<L>  |  1.61<H>    Ca    10.3      06 Jul 2021 07:06  Phos  2.8     07-06  Mg     2.10     07-06    TPro  6.6  /  Alb  3.5  /  TBili  0.7  /  DBili  x   /  AST  38<H>  /  ALT  11  /  AlkPhos  77  07-06    CAPILLARY BLOOD GLUCOSE      POCT Blood Glucose.: 75 mg/dL (06 Jul 2021 08:21)  POCT Blood Glucose.: 65 mg/dL (06 Jul 2021 08:19)  POCT Blood Glucose.: 75 mg/dL (06 Jul 2021 05:52)  POCT Blood Glucose.: 162 mg/dL (06 Jul 2021 01:43)  POCT Blood Glucose.: 162 mg/dL (06 Jul 2021 01:31)  POCT Blood Glucose.: 556 mg/dL (06 Jul 2021 01:21)  POCT Blood Glucose.: 65 mg/dL (06 Jul 2021 00:52)  POCT Blood Glucose.: 58 mg/dL (06 Jul 2021 00:20)  POCT Blood Glucose.: 64 mg/dL (06 Jul 2021 00:18)      
Patient is a 81y old  Female who presents with a chief complaint of Fall (11 Jul 2021 22:37)      DATE OF SERVICE: 07-12-21 @ 11:33    SUBJECTIVE / OVERNIGHT EVENTS: overnight events noted    ROS:  Resp: No cough no sputum production  CVS: No chest pain no palpitations no orthopnea  GI: no N/V/D  : no dysuria, no hematuria  Neuro: no weakness no paresthesias          MEDICATIONS  (STANDING):  amLODIPine   Tablet 10 milliGRAM(s) Oral daily  carvedilol 3.125 milliGRAM(s) Oral every 12 hours  dextrose 40% Gel 15 Gram(s) Oral once  dextrose 50% Injectable 25 Gram(s) IV Push once  dextrose 50% Injectable 12.5 Gram(s) IV Push once  dextrose 50% Injectable 25 Gram(s) IV Push once  glucagon  Injectable 1 milliGRAM(s) IntraMuscular once  heparin   Injectable 5000 Unit(s) SubCutaneous every 12 hours    MEDICATIONS  (PRN):        CAPILLARY BLOOD GLUCOSE      POCT Blood Glucose.: 85 mg/dL (12 Jul 2021 08:53)  POCT Blood Glucose.: 88 mg/dL (11 Jul 2021 21:50)  POCT Blood Glucose.: 92 mg/dL (11 Jul 2021 18:14)  POCT Blood Glucose.: 81 mg/dL (11 Jul 2021 13:47)    I&O's Summary    11 Jul 2021 07:01  -  12 Jul 2021 07:00  --------------------------------------------------------  IN: 550 mL / OUT: 0 mL / NET: 550 mL        Vital Signs Last 24 Hrs  T(C): 36.8 (12 Jul 2021 06:05), Max: 36.9 (11 Jul 2021 14:42)  T(F): 98.2 (12 Jul 2021 06:05), Max: 98.5 (11 Jul 2021 14:42)  HR: 64 (12 Jul 2021 06:05) (64 - 75)  BP: 167/74 (12 Jul 2021 06:05) (157/70 - 167/74)  BP(mean): --  RR: 18 (12 Jul 2021 06:05) (18 - 18)  SpO2: 100% (12 Jul 2021 06:05) (97% - 100%)    PHYSICAL EXAM:  GENERAL: asthenic  NECK: Supple, No JVD  CHEST/LUNG: clear    HEART: S1 S2; no murmurs   ABDOMEN: Soft, Nontender  EXTREMITIES:  no edema  NEUROLOGY: Alert     LABS:                        11.5   6.03  )-----------( 345      ( 12 Jul 2021 09:58 )             35.6     07-12    133<L>  |  101  |  17  ----------------------------<  89  3.9   |  22  |  1.13    Ca    10.0      12 Jul 2021 09:58                  All consultant(s) notes reviewed and care discussed with other providers        Contact Number, Dr Combs [phone]
Cardiology/Vascular Medicine Attending Note     No new complaints  Telemetry: SR 60s bpm    Vital Signs Last 24 Hrs  T(C): 36.7 (07 Jul 2021 17:25), Max: 36.7 (07 Jul 2021 17:25)  T(F): 98 (07 Jul 2021 17:25), Max: 98 (07 Jul 2021 17:25)  HR: 66 (07 Jul 2021 17:25) (61 - 68)  BP: 148/60 (07 Jul 2021 17:25) (129/59 - 158/71)  BP(mean): --  RR: 16 (07 Jul 2021 17:25) (16 - 18)  SpO2: 98% (07 Jul 2021 17:25) (93% - 100%)    Appearance: NAD  HEENT: No JVD  Cardiovascular: Regular S1S2, 2/6 SM  Respiratory: Decreased breath sounds bilaterally  Ext: No edema    MEDICATIONS  (STANDING):  amLODIPine   Tablet 5 milliGRAM(s) Oral daily  carvedilol 3.125 milliGRAM(s) Oral every 12 hours  dextrose 40% Gel 15 Gram(s) Oral once  dextrose 5% + sodium chloride 0.9%. 1000 milliLiter(s) (100 mL/Hr) IV Continuous <Continuous>  dextrose 50% Injectable 25 Gram(s) IV Push once  dextrose 50% Injectable 12.5 Gram(s) IV Push once  dextrose 50% Injectable 25 Gram(s) IV Push once  glucagon  Injectable 1 milliGRAM(s) IntraMuscular once  heparin   Injectable 5000 Unit(s) SubCutaneous every 12 hours      LABORATORY VALUES	 	                          10.8   5.17  )-----------( 256      ( 07 Jul 2021 07:23 )             34.4     07-07    140  |  109<H>  |  17  ----------------------------<  90  3.7   |  20<L>  |  1.21    Ca    9.7      07 Jul 2021 07:23  Phos  2.2     07-07  Mg     1.80     07-07    TPro  5.9<L>  /  Alb  3.0<L>  /  TBili  0.4  /  DBili  x   /  AST  31  /  ALT  10  /  AlkPhos  62  07-07      < from: Transthoracic Echocardiogram (03.15.21 @ 11:12) >    Patient name: MIGUEL ADHIKARI  YOB: 1940   Age: 81 (F)   MR#: [id number]  Study Date: 3/15/2021  Location: [id number]: Janessa Whitfield [facility]  Study quality: Technically Fair  Referring Physician: Zara Oconnor MD  Blood Pressure:173/53 mmHg  Height: 163 cm  Weight: 55 kg  BSA: 1.6 m2  ------------------------------------------------------------------------  PROCEDURE: Transthoracic echocardiogram with 2-D, M-Mode  and complete spectral and color flow Doppler.  INDICATION: Syncope and collapse (R55)  ------------------------------------------------------------------------  DIMENSIONS:  Dimensions:     Normal Values:  LA:     3.4 cm    2.0 - 4.0 cm  Ao:     2.4 cm    2.0 - 3.8 cm  SEPTUM: 0.7 cm    0.6 - 1.2 cm  PWT:    0.8 cm    0.6 - 1.1 cm  LVIDd:  4.0 cm    3.0 - 5.6 cm  LVIDs:  2.7 cm    1.8 - 4.0 cm  Derived Variables:  LVMI: 54 g/m2  RWT: 0.40  Fractional short: 33 %  Ejection Fraction (Teicholtz): 61 %  ------------------------------------------------------------------------  OBSERVATIONS:  Mitral Valve: Mitral annular calcification, otherwise  normal mitral valve. Minimal mitral regurgitation.  Aortic Root: Normal aortic root.  Aortic Valve: Aortic valve leaflet morphology not well  visualized.  Left Atrium: Normal left atrium.  LA volume index = 21  cc/m2.  Left Ventricle: Endocardium not well visualized; grossly  normal left ventricular systolic function. Normal left  ventricular internal dimensions and wall thicknesses. Mild  diastolic dysfunction (Stage I).  Right Heart: Normal right atrium. The right ventricle is  not well visualized; grossly normal right ventricular  systolic function. Normal tricuspid valve. Minimal  tricuspid regurgitation. Normal pulmonic valve.  Pericardium/PleuraNormal pericardium with no pericardial  effusion.  ------------------------------------------------------------------------  CONCLUSIONS:  1. Mitral annular calcification, otherwise normal mitral  valve. Minimal mitral regurgitation.  2. Normal left ventricular internal dimensions and wall  thicknesses.  3. Endocardium not well visualized; grossly normal left  ventricular systolic function.  4. Mild diastolic dysfunction (Stage I).  5. The right ventricle is not well visualized; grossly  normal right ventricular systolic function.  ------------------------------------------------------------------------  Confirmed on  3/15/2021 - 12:51:29 by Aquiles Patel M.D.,  [facility], [place]  ------------------------------------------------------------------------    < end of copied text >        < from: US Thyroid + Parathyroid (07.06.21 @ 14:39) >    EXAM:  US THYROID AND PARATHYROID        PROCEDURE DATE:  Jul 6 2021         INTERPRETATION:  CLINICAL INFORMATION: Hypercalcemia.    COMPARISON: None available.    TECHNIQUE: Sonography of the thyroid.    FINDINGS:  Right Lobe: 4.3 x 0.6 x 1.6 cm. Heterogeneous in echogenicity. No discrete nodules.    Left Lobe: 2.7 x 1.6 x 1.5 cm. Heterogeneous in echogenicity. No discrete nodules. Increased vascularity.    Isthmus: 4 mm.    Cervical Lymph Nodes: No enlarged or abnormal morphology cervical nodes.    No discrete nodule is seen adjacent to the thyroid to suggest a parathyroid adenoma.    IMPRESSION:    No discrete nodule adjacent to the thyroid to suggest a parathyroid adenoma.    Heterogeneous thyroid with no discrete thyroid nodules.    Thyroid lobe increased in vascularity which may reflect thyroiditis.    --- End of Report ---              ROCKY BRADEN MD; Attending Radiologist  This document has been electronically signed. Jul 6 2021  4:32PM    < end of copied text >  < from: US Kidney and Bladder (07.06.21 @ 14:38) >    EXAM:  US KIDNEYS AND BLADDER        PROCEDURE DATE:  Jul 6 2021         INTERPRETATION:  CLINICAL INFORMATION: Acute kidney injury.    COMPARISON: CT abdomen/pelvis 3/16/2021    TECHNIQUE: Sonography of the kidneys and bladder. The examination was technically limited secondary to overlying bowel gas.    FINDINGS:    Right kidney: 7.8 cm. No hydronephrosis.    Left kidney: 10.1 cm. No hydronephrosis. 3.5 cm cyst in the mid kidney. Increased in echogenicity.    Urinary bladder: Limited, otherwise unremarkable.    IMPRESSION:    Examination limited secondary to overlying bowel gas.    No hydronephrosis.    --- End of Report ---              ROCKY BRADEN MD; Attending Radiologist  This document has been electronically signed. Jul 6 2021  3:33PM    < end of copied text >    
Chief complaint    Patient is a 81y old  Female who presents with a chief complaint of Fall (09 Jul 2021 16:00)   Review of systems  Patient in bed, appears comfortable.    Labs and Fingersticks  CAPILLARY BLOOD GLUCOSE      POCT Blood Glucose.: 72 mg/dL (10 Jul 2021 06:33)  POCT Blood Glucose.: 88 mg/dL (10 Jul 2021 00:11)  POCT Blood Glucose.: 83 mg/dL (09 Jul 2021 19:20)  POCT Blood Glucose.: 77 mg/dL (09 Jul 2021 13:03)                        Medications  MEDICATIONS  (STANDING):  amLODIPine   Tablet 5 milliGRAM(s) Oral daily  carvedilol 3.125 milliGRAM(s) Oral every 12 hours  dextrose 40% Gel 15 Gram(s) Oral once  dextrose 50% Injectable 25 Gram(s) IV Push once  dextrose 50% Injectable 12.5 Gram(s) IV Push once  dextrose 50% Injectable 25 Gram(s) IV Push once  glucagon  Injectable 1 milliGRAM(s) IntraMuscular once  heparin   Injectable 5000 Unit(s) SubCutaneous every 12 hours      Physical Exam  General: Patient comfortable in bed  Vital Signs Last 12 Hrs  T(F): 98 (07-10-21 @ 05:35), Max: 98.9 (07-09-21 @ 21:44)  HR: 70 (07-10-21 @ 05:35) (63 - 70)  BP: 198/73 (07-10-21 @ 05:35) (152/63 - 198/73)  BP(mean): --  RR: 17 (07-10-21 @ 05:35) (17 - 17)  SpO2: 100% (07-10-21 @ 05:35) (100% - 100%)  Neck: No palpable thyroid nodules.  CVS: S1S2, No murmurs  Respiratory: No wheezing, no crepitations  GI: Abdomen soft, bowel sounds positive  Musculoskeletal:  edema lower extremities.     Diagnostics          
Patient is a 81y old  Female who presents with a chief complaint of Fall (10 Jul 2021 09:23)      DATE OF SERVICE: 07-10-21 @ 11:27    SUBJECTIVE / OVERNIGHT EVENTS: overnight events noted    ROS:  not available         MEDICATIONS  (STANDING):  amLODIPine   Tablet 5 milliGRAM(s) Oral daily  carvedilol 3.125 milliGRAM(s) Oral every 12 hours  dextrose 40% Gel 15 Gram(s) Oral once  dextrose 50% Injectable 25 Gram(s) IV Push once  dextrose 50% Injectable 12.5 Gram(s) IV Push once  dextrose 50% Injectable 25 Gram(s) IV Push once  glucagon  Injectable 1 milliGRAM(s) IntraMuscular once  heparin   Injectable 5000 Unit(s) SubCutaneous every 12 hours    MEDICATIONS  (PRN):        CAPILLARY BLOOD GLUCOSE      POCT Blood Glucose.: 72 mg/dL (10 Jul 2021 06:33)  POCT Blood Glucose.: 88 mg/dL (10 Jul 2021 00:11)  POCT Blood Glucose.: 83 mg/dL (09 Jul 2021 19:20)  POCT Blood Glucose.: 77 mg/dL (09 Jul 2021 13:03)    I&O's Summary    09 Jul 2021 07:01  -  10 Jul 2021 07:00  --------------------------------------------------------  IN: 0 mL / OUT: 0 mL / NET: 0 mL        Vital Signs Last 24 Hrs  T(C): 36.7 (10 Jul 2021 05:35), Max: 37.2 (09 Jul 2021 21:44)  T(F): 98 (10 Jul 2021 05:35), Max: 98.9 (09 Jul 2021 21:44)  HR: 70 (10 Jul 2021 05:35) (61 - 70)  BP: 198/73 (10 Jul 2021 05:35) (152/63 - 198/73)  BP(mean): --  RR: 17 (10 Jul 2021 05:35) (17 - 18)  SpO2: 100% (10 Jul 2021 05:35) (100% - 100%)    PHYSICAL EXAM:  GENERAL: asthenic  NECK: Supple, No JVD  CHEST/LUNG: clear    HEART: S1 S2; no murmurs   ABDOMEN: Soft, Nontender  EXTREMITIES:  no edema  NEUROLOGY: Alert     LABS:                      All consultant(s) notes reviewed and care discussed with other providers        Contact Number, Dr Combs [phone]
Patient is a 81y old  Female who presents with a chief complaint of Fall (15 Jul 2021 09:30)  seen earlier today      DATE OF SERVICE: 07-15-21 @ 14:08    SUBJECTIVE / OVERNIGHT EVENTS: overnight events noted    ROS:  Resp: No cough no sputum production  CVS: No chest pain no palpitations no orthopnea  GI: no N/V/D  : no dysuria, no hematuria  Neuro: no weakness no paresthesias          MEDICATIONS  (STANDING):  amLODIPine   Tablet 10 milliGRAM(s) Oral daily  atorvastatin 40 milliGRAM(s) Oral at bedtime  carvedilol 3.125 milliGRAM(s) Oral every 12 hours  dextrose 40% Gel 15 Gram(s) Oral once  dextrose 50% Injectable 25 Gram(s) IV Push once  dextrose 50% Injectable 12.5 Gram(s) IV Push once  dextrose 50% Injectable 25 Gram(s) IV Push once  glucagon  Injectable 1 milliGRAM(s) IntraMuscular once  heparin   Injectable 5000 Unit(s) SubCutaneous every 12 hours    MEDICATIONS  (PRN):        CAPILLARY BLOOD GLUCOSE      POCT Blood Glucose.: 100 mg/dL (15 Jul 2021 09:02)  POCT Blood Glucose.: 111 mg/dL (14 Jul 2021 21:14)  POCT Blood Glucose.: 80 mg/dL (14 Jul 2021 17:51)    I&O's Summary    14 Jul 2021 07:01  -  15 Jul 2021 07:00  --------------------------------------------------------  IN: 200 mL / OUT: 0 mL / NET: 200 mL        Vital Signs Last 24 Hrs  T(C): 36.6 (15 Jul 2021 05:03), Max: 36.8 (14 Jul 2021 17:30)  T(F): 97.9 (15 Jul 2021 05:03), Max: 98.3 (14 Jul 2021 17:30)  HR: 63 (15 Jul 2021 05:03) (63 - 70)  BP: 145/60 (15 Jul 2021 05:03) (131/64 - 145/60)  BP(mean): --  RR: 17 (15 Jul 2021 05:03) (17 - 18)  SpO2: 100% (15 Jul 2021 05:03) (98% - 100%)      PHYSICAL EXAM:  GENERAL: asthenic  NECK: Supple, No JVD  CHEST/LUNG: clear    HEART: S1 S2; no murmurs   ABDOMEN: Soft, Nontender  EXTREMITIES:  no edema  NEUROLOGY: Alert     LABS:                        13.4   5.28  )-----------( 404      ( 14 Jul 2021 07:42 )             42.2     07-14    135  |  100  |  19  ----------------------------<  86  4.3   |  23  |  1.15    Ca    10.8<H>      14 Jul 2021 07:42  Phos  2.7     07-14  Mg     1.80     07-14                  All consultant(s) notes reviewed and care discussed with other providers        Contact Number, Dr Combs [phone]
Patient is a 81y old  Female who presents with a chief complaint of Fall (10 Jul 2021 11:27)      DATE OF SERVICE: 07-11-21 @ 12:03    SUBJECTIVE / OVERNIGHT EVENTS: overnight events noted    ROS:  not available         MEDICATIONS  (STANDING):  amLODIPine   Tablet 10 milliGRAM(s) Oral daily  carvedilol 3.125 milliGRAM(s) Oral every 12 hours  dextrose 40% Gel 15 Gram(s) Oral once  dextrose 50% Injectable 25 Gram(s) IV Push once  dextrose 50% Injectable 12.5 Gram(s) IV Push once  dextrose 50% Injectable 25 Gram(s) IV Push once  glucagon  Injectable 1 milliGRAM(s) IntraMuscular once  heparin   Injectable 5000 Unit(s) SubCutaneous every 12 hours    MEDICATIONS  (PRN):        CAPILLARY BLOOD GLUCOSE      POCT Blood Glucose.: 101 mg/dL (11 Jul 2021 06:14)  POCT Blood Glucose.: 73 mg/dL (11 Jul 2021 00:12)  POCT Blood Glucose.: 82 mg/dL (10 Jul 2021 18:24)  POCT Blood Glucose.: 88 mg/dL (10 Jul 2021 13:03)    I&O's Summary    10 Jul 2021 07:01  -  11 Jul 2021 07:00  --------------------------------------------------------  IN: 550 mL / OUT: 0 mL / NET: 550 mL        Vital Signs Last 24 Hrs  T(C): 36.4 (11 Jul 2021 05:15), Max: 37 (10 Jul 2021 22:23)  T(F): 97.6 (11 Jul 2021 05:15), Max: 98.6 (10 Jul 2021 22:23)  HR: 68 (11 Jul 2021 05:15) (66 - 70)  BP: 137/61 (11 Jul 2021 05:15) (137/61 - 180/72)  BP(mean): --  RR: 17 (11 Jul 2021 05:15) (17 - 17)  SpO2: 98% (11 Jul 2021 05:15) (98% - 100%)    PHYSICAL EXAM:  GENERAL: asthenic  NECK: Supple, No JVD  CHEST/LUNG: clear    HEART: S1 S2; no murmurs   ABDOMEN: Soft, Nontender  EXTREMITIES:  no edema  NEUROLOGY: Alert     LABS:                      All consultant(s) notes reviewed and care discussed with other providers        Contact Number, Dr Combs [phone]
Patient is a 81y old  Female who presents with a chief complaint of Fall (08 Jul 2021 10:34)      DATE OF SERVICE: 07-08-21 @ 13:15    SUBJECTIVE / OVERNIGHT EVENTS: overnight events noted    ROS:  Resp: No cough no sputum production  GI: no N/V/D  per RN            MEDICATIONS  (STANDING):  amLODIPine   Tablet 5 milliGRAM(s) Oral daily  carvedilol 3.125 milliGRAM(s) Oral every 12 hours  dextrose 40% Gel 15 Gram(s) Oral once  dextrose 50% Injectable 25 Gram(s) IV Push once  dextrose 50% Injectable 12.5 Gram(s) IV Push once  dextrose 50% Injectable 25 Gram(s) IV Push once  glucagon  Injectable 1 milliGRAM(s) IntraMuscular once  heparin   Injectable 5000 Unit(s) SubCutaneous every 12 hours    MEDICATIONS  (PRN):        CAPILLARY BLOOD GLUCOSE      POCT Blood Glucose.: 81 mg/dL (08 Jul 2021 12:46)  POCT Blood Glucose.: 96 mg/dL (08 Jul 2021 06:17)  POCT Blood Glucose.: 97 mg/dL (07 Jul 2021 23:28)  POCT Blood Glucose.: 87 mg/dL (07 Jul 2021 17:49)    I&O's Summary    07 Jul 2021 07:01  -  08 Jul 2021 07:00  --------------------------------------------------------  IN: 0 mL / OUT: 375 mL / NET: -375 mL        Vital Signs Last 24 Hrs  T(C): 36.4 (08 Jul 2021 12:44), Max: 37.2 (08 Jul 2021 06:06)  T(F): 97.5 (08 Jul 2021 12:44), Max: 99 (08 Jul 2021 06:06)  HR: 69 (08 Jul 2021 12:44) (66 - 69)  BP: 158/66 (08 Jul 2021 12:44) (129/55 - 170/57)  BP(mean): --  RR: 18 (08 Jul 2021 12:44) (16 - 18)  SpO2: 99% (08 Jul 2021 12:44) (98% - 100%)    PHYSICAL EXAM:  GENERAL: asthenic  NECK: Supple, No JVD  CHEST/LUNG: clear    HEART: S1 S2; no murmurs   ABDOMEN: Soft, Nontender  EXTREMITIES:  no edema  NEUROLOGY: Alert     LABS:                        10.3   5.00  )-----------( 301      ( 08 Jul 2021 05:38 )             32.4     07-08    138  |  109<H>  |  10  ----------------------------<  101<H>  3.7   |  20<L>  |  1.11    Ca    9.3      08 Jul 2021 05:38  Phos  3.1     07-08  Mg     1.50     07-08    TPro  5.9<L>  /  Alb  3.0<L>  /  TBili  0.4  /  DBili  x   /  AST  31  /  ALT  10  /  AlkPhos  62  07-07                All consultant(s) notes reviewed and care discussed with other providers        Contact Number, Dr Combs [phone]
  Chief complaint    Patient is a 81y old  Female who presents with a chief complaint of Fall (11 Jul 2021 12:03)   Review of systems  Patient in bed, appears comfortable.    Labs and Fingersticks  CAPILLARY BLOOD GLUCOSE      POCT Blood Glucose.: 88 mg/dL (11 Jul 2021 21:50)  POCT Blood Glucose.: 92 mg/dL (11 Jul 2021 18:14)  POCT Blood Glucose.: 81 mg/dL (11 Jul 2021 13:47)  POCT Blood Glucose.: 101 mg/dL (11 Jul 2021 06:14)  POCT Blood Glucose.: 73 mg/dL (11 Jul 2021 00:12)                        Medications  MEDICATIONS  (STANDING):  amLODIPine   Tablet 10 milliGRAM(s) Oral daily  carvedilol 3.125 milliGRAM(s) Oral every 12 hours  dextrose 40% Gel 15 Gram(s) Oral once  dextrose 50% Injectable 25 Gram(s) IV Push once  dextrose 50% Injectable 12.5 Gram(s) IV Push once  dextrose 50% Injectable 25 Gram(s) IV Push once  glucagon  Injectable 1 milliGRAM(s) IntraMuscular once  heparin   Injectable 5000 Unit(s) SubCutaneous every 12 hours      Physical Exam  General: Patient comfortable in bed  Vital Signs Last 12 Hrs  T(F): 98.3 (07-11-21 @ 18:03), Max: 98.5 (07-11-21 @ 14:42)  HR: 72 (07-11-21 @ 18:03) (64 - 72)  BP: 164/80 (07-11-21 @ 18:03) (164/80 - 165/70)  BP(mean): --  RR: 18 (07-11-21 @ 18:03) (18 - 18)  SpO2: 97% (07-11-21 @ 18:03) (97% - 100%)  Neck: No palpable thyroid nodules.  CVS: S1S2, No murmurs  Respiratory: No wheezing, no crepitations  GI: Abdomen soft, bowel sounds positive  Musculoskeletal:  edema lower extremities.     Diagnostics          
Patient is a 81y old  Female who presents with a chief complaint of Fall (2021 11:46)  patient not known to me, assigned this AM to assume care  chart reviewed and events thus far noted  admitted overnight by faculty hospitalist     DATE OF SERVICE: 21 @ 14:59    SUBJECTIVE / OVERNIGHT EVENTS: overnight events noted    ROS:  not available         MEDICATIONS  (STANDING):  amLODIPine   Tablet 5 milliGRAM(s) Oral daily  carvedilol 3.125 milliGRAM(s) Oral every 12 hours  dextrose 40% Gel 15 Gram(s) Oral once  dextrose 5% + sodium chloride 0.9%. 1000 milliLiter(s) (100 mL/Hr) IV Continuous <Continuous>  dextrose 50% Injectable 25 Gram(s) IV Push once  dextrose 50% Injectable 12.5 Gram(s) IV Push once  dextrose 50% Injectable 25 Gram(s) IV Push once  glucagon  Injectable 1 milliGRAM(s) IntraMuscular once  heparin   Injectable 5000 Unit(s) SubCutaneous every 12 hours    MEDICATIONS  (PRN):        CAPILLARY BLOOD GLUCOSE      POCT Blood Glucose.: 286 mg/dL (2021 13:36)  POCT Blood Glucose.: 232 mg/dL (2021 13:18)  POCT Blood Glucose.: 84 mg/dL (2021 12:50)  POCT Blood Glucose.: 73 mg/dL (2021 12:33)  POCT Blood Glucose.: 61 mg/dL (2021 12:15)  POCT Blood Glucose.: 59 mg/dL (2021 12:13)  POCT Blood Glucose.: 75 mg/dL (2021 08:21)  POCT Blood Glucose.: 65 mg/dL (2021 08:19)  POCT Blood Glucose.: 75 mg/dL (2021 05:52)  POCT Blood Glucose.: 162 mg/dL (2021 01:43)  POCT Blood Glucose.: 162 mg/dL (2021 01:31)  POCT Blood Glucose.: 556 mg/dL (2021 01:21)  POCT Blood Glucose.: 65 mg/dL (2021 00:52)  POCT Blood Glucose.: 58 mg/dL (2021 00:20)  POCT Blood Glucose.: 64 mg/dL (2021 00:18)    I&O's Summary    2021 07:01  -  2021 07:00  --------------------------------------------------------  IN: 800 mL / OUT: 0 mL / NET: 800 mL        Vital Signs Last 24 Hrs  T(C): 36.1 (2021 11:50), Max: 36.7 (2021 15:43)  T(F): 97 (2021 11:50), Max: 98 (2021 15:43)  HR: 77 (2021 11:50) (62 - 77)  BP: 128/79 (2021 11:50) (124/79 - 144/72)  BP(mean): --  RR: 18 (2021 11:50) (16 - 18)  SpO2: 95% (2021 11:50) (95% - 98%)    PHYSICAL EXAM:  GENERAL: asthenic  EYES: EOMI, PERRLA,  NECK: Supple, No JVD  CHEST/LUNG: clear    HEART: S1 S2; no murmurs   ABDOMEN: Soft, Nontender  EXTREMITIES:  no edema  NEUROLOGY: AO x 3 non-focal  SKIN: No rashes or lesions    LABS:                        11.8   5.21  )-----------( 305      ( 2021 07:06 )             37.6     07-06    135  |  102  |  30<H>  ----------------------------<  87  4.2   |  18<L>  |  1.61<H>    Ca    10.3      2021 07:06  Phos  2.8     07-06  Mg     2.10     07-06    TPro  6.6  /  Alb  3.5  /  TBili  0.7  /  DBili  x   /  AST  38<H>  /  ALT  11  /  AlkPhos  77  07-06    PT/INR - ( 2021 01:11 )   PT: 12.3 sec;   INR: 1.08 ratio         PTT - ( 2021 01:11 )  PTT:30.2 sec  CARDIAC MARKERS ( 2021 04:54 )  x     / x     / 392 U/L / x     / 8.9 ng/mL  CARDIAC MARKERS ( 2021 03:00 )  x     / x     / 370 U/L / x     / 8.8 ng/mL      Urinalysis Basic - ( 2021 03:59 )    Color: Yellow / Appearance: Slightly Turbid / S.026 / pH: x  Gluc: x / Ketone: Trace  / Bili: Negative / Urobili: 3 mg/dL   Blood: x / Protein: 30 mg/dL / Nitrite: Negative   Leuk Esterase: Negative / RBC: 1 /HPF / WBC 1 /HPF   Sq Epi: x / Non Sq Epi: 1 /HPF / Bacteria: Negative          All consultant(s) notes reviewed and care discussed with other providers        Contact Number, Dr Combs [phone]
    Chief complaint    Patient is a 81y old  Female who presents with a chief complaint of Fall (12 Jul 2021 11:31)   Review of systems  Patient in bed, appears comfortable.    Labs and Fingersticks  CAPILLARY BLOOD GLUCOSE      POCT Blood Glucose.: 92 mg/dL (12 Jul 2021 12:30)  POCT Blood Glucose.: 85 mg/dL (12 Jul 2021 08:53)  POCT Blood Glucose.: 88 mg/dL (11 Jul 2021 21:50)  POCT Blood Glucose.: 92 mg/dL (11 Jul 2021 18:14)  POCT Blood Glucose.: 81 mg/dL (11 Jul 2021 13:47)      Anion Gap, Serum: 10 (07-12 @ 09:58)      Calcium, Total Serum: 10.0 (07-12 @ 09:58)          07-12    133<L>  |  101  |  17  ----------------------------<  89  3.9   |  22  |  1.13    Ca    10.0      12 Jul 2021 09:58                          11.5   6.03  )-----------( 345      ( 12 Jul 2021 09:58 )             35.6     Medications  MEDICATIONS  (STANDING):  amLODIPine   Tablet 10 milliGRAM(s) Oral daily  carvedilol 3.125 milliGRAM(s) Oral every 12 hours  dextrose 40% Gel 15 Gram(s) Oral once  dextrose 50% Injectable 25 Gram(s) IV Push once  dextrose 50% Injectable 12.5 Gram(s) IV Push once  dextrose 50% Injectable 25 Gram(s) IV Push once  glucagon  Injectable 1 milliGRAM(s) IntraMuscular once  heparin   Injectable 5000 Unit(s) SubCutaneous every 12 hours      Physical Exam  General: Patient comfortable in bed  Vital Signs Last 12 Hrs  T(F): 98 (07-12-21 @ 12:39), Max: 98.2 (07-12-21 @ 06:05)  HR: 64 (07-12-21 @ 12:39) (64 - 64)  BP: 130/55 (07-12-21 @ 12:39) (130/55 - 167/74)  BP(mean): --  RR: 18 (07-12-21 @ 12:39) (18 - 18)  SpO2: 100% (07-12-21 @ 12:39) (100% - 100%)  Neck: No palpable thyroid nodules.  CVS: S1S2, No murmurs  Respiratory: No wheezing, no crepitations  GI: Abdomen soft, bowel sounds positive  Musculoskeletal:  edema lower extremities.     Diagnostics          
Patient is a 81y old  Female who presents with a chief complaint of Fall (06 Jul 2021 14:59)      DATE OF SERVICE: 07-07-21 @ 11:46    SUBJECTIVE / OVERNIGHT EVENTS: overnight events noted    ROS:  not available         MEDICATIONS  (STANDING):  amLODIPine   Tablet 5 milliGRAM(s) Oral daily  carvedilol 3.125 milliGRAM(s) Oral every 12 hours  dextrose 40% Gel 15 Gram(s) Oral once  dextrose 5% + sodium chloride 0.9%. 1000 milliLiter(s) (100 mL/Hr) IV Continuous <Continuous>  dextrose 50% Injectable 25 Gram(s) IV Push once  dextrose 50% Injectable 12.5 Gram(s) IV Push once  dextrose 50% Injectable 25 Gram(s) IV Push once  glucagon  Injectable 1 milliGRAM(s) IntraMuscular once  heparin   Injectable 5000 Unit(s) SubCutaneous every 12 hours    MEDICATIONS  (PRN):        CAPILLARY BLOOD GLUCOSE      POCT Blood Glucose.: 82 mg/dL (07 Jul 2021 06:06)  POCT Blood Glucose.: 73 mg/dL (07 Jul 2021 00:22)  POCT Blood Glucose.: 77 mg/dL (06 Jul 2021 17:53)  POCT Blood Glucose.: 59 mg/dL (06 Jul 2021 17:51)  POCT Blood Glucose.: 286 mg/dL (06 Jul 2021 13:36)  POCT Blood Glucose.: 232 mg/dL (06 Jul 2021 13:18)  POCT Blood Glucose.: 84 mg/dL (06 Jul 2021 12:50)  POCT Blood Glucose.: 73 mg/dL (06 Jul 2021 12:33)  POCT Blood Glucose.: 61 mg/dL (06 Jul 2021 12:15)  POCT Blood Glucose.: 59 mg/dL (06 Jul 2021 12:13)    I&O's Summary      Vital Signs Last 24 Hrs  T(C): 36.5 (07 Jul 2021 05:35), Max: 36.6 (06 Jul 2021 23:26)  T(F): 97.7 (07 Jul 2021 05:35), Max: 97.8 (06 Jul 2021 23:26)  HR: 61 (07 Jul 2021 05:35) (61 - 77)  BP: 158/71 (07 Jul 2021 05:35) (128/79 - 158/71)  BP(mean): --  RR: 17 (07 Jul 2021 05:35) (16 - 18)  SpO2: 100% (07 Jul 2021 05:35) (93% - 100%)    PHYSICAL EXAM:  GENERAL: asthenic  EYES: EOMI, PERRLA,  NECK: Supple, No JVD  CHEST/LUNG: clear    HEART: S1 S2; no murmurs   ABDOMEN: Soft, Nontender  EXTREMITIES:  no edema  NEUROLOGY: Alert moving all limbs      LABS:                        10.8   5.17  )-----------( 256      ( 07 Jul 2021 07:23 )             34.4     07-07    140  |  109<H>  |  17  ----------------------------<  90  3.7   |  20<L>  |  1.21    Ca    9.7      07 Jul 2021 07:23  Phos  2.2     07-07  Mg     1.80     07-07    TPro  5.9<L>  /  Alb  3.0<L>  /  TBili  0.4  /  DBili  x   /  AST  31  /  ALT  10  /  AlkPhos  62  07-07                All consultant(s) notes reviewed and care discussed with other providers        Contact Number, Dr Combs [phone]
  Chief complaint    Patient is a 81y old  Female who presents with a chief complaint of Fall (08 Jul 2021 13:15)   Review of systems  Patient in bed, appears comfortable.    Labs and Fingersticks  CAPILLARY BLOOD GLUCOSE      POCT Blood Glucose.: 77 mg/dL (09 Jul 2021 13:03)  POCT Blood Glucose.: 84 mg/dL (09 Jul 2021 07:29)  POCT Blood Glucose.: 92 mg/dL (08 Jul 2021 23:16)  POCT Blood Glucose.: 95 mg/dL (08 Jul 2021 17:34)      Anion Gap, Serum: 9 (07-08 @ 05:38)      Calcium, Total Serum: 9.3 (07-08 @ 05:38)          07-08    138  |  109<H>  |  10  ----------------------------<  101<H>  3.7   |  20<L>  |  1.11    Ca    9.3      08 Jul 2021 05:38  Phos  3.1     07-08  Mg     1.50     07-08                          10.3   5.00  )-----------( 301      ( 08 Jul 2021 05:38 )             32.4     Medications  MEDICATIONS  (STANDING):  amLODIPine   Tablet 5 milliGRAM(s) Oral daily  carvedilol 3.125 milliGRAM(s) Oral every 12 hours  dextrose 40% Gel 15 Gram(s) Oral once  dextrose 50% Injectable 25 Gram(s) IV Push once  dextrose 50% Injectable 12.5 Gram(s) IV Push once  dextrose 50% Injectable 25 Gram(s) IV Push once  glucagon  Injectable 1 milliGRAM(s) IntraMuscular once  heparin   Injectable 5000 Unit(s) SubCutaneous every 12 hours      Physical Exam  General: Patient comfortable in bed  Vital Signs Last 12 Hrs  T(F): 98.9 (07-09-21 @ 04:54), Max: 98.9 (07-09-21 @ 04:54)  HR: 66 (07-09-21 @ 04:54) (66 - 66)  BP: 128/75 (07-09-21 @ 04:54) (128/75 - 128/75)  BP(mean): --  RR: 18 (07-09-21 @ 04:54) (18 - 18)  SpO2: 100% (07-09-21 @ 04:54) (100% - 100%)  Neck: No palpable thyroid nodules.  CVS: S1S2, No murmurs  Respiratory: No wheezing, no crepitations  GI: Abdomen soft, bowel sounds positive  Musculoskeletal:  edema lower extremities.     Diagnostics          
  Chief complaint    Patient is a 81y old  Female who presents with a chief complaint of Fall (14 Jul 2021 14:02)   Review of systems  Patient in bed, appears comfortable.    Labs and Fingersticks  CAPILLARY BLOOD GLUCOSE      POCT Blood Glucose.: 84 mg/dL (13 Jul 2021 22:07)  POCT Blood Glucose.: 76 mg/dL (13 Jul 2021 17:53)      Anion Gap, Serum: 12 (07-14 @ 07:42)      Calcium, Total Serum: 10.8 *H* (07-14 @ 07:42)          07-14    135  |  100  |  19  ----------------------------<  86  4.3   |  23  |  1.15    Ca    10.8<H>      14 Jul 2021 07:42  Phos  2.7     07-14  Mg     1.80     07-14                          13.4   5.28  )-----------( 404      ( 14 Jul 2021 07:42 )             42.2     Medications  MEDICATIONS  (STANDING):  amLODIPine   Tablet 10 milliGRAM(s) Oral daily  atorvastatin 40 milliGRAM(s) Oral at bedtime  carvedilol 3.125 milliGRAM(s) Oral every 12 hours  dextrose 40% Gel 15 Gram(s) Oral once  dextrose 50% Injectable 25 Gram(s) IV Push once  dextrose 50% Injectable 12.5 Gram(s) IV Push once  dextrose 50% Injectable 25 Gram(s) IV Push once  glucagon  Injectable 1 milliGRAM(s) IntraMuscular once  heparin   Injectable 5000 Unit(s) SubCutaneous every 12 hours      Physical Exam  General: Patient comfortable in bed  Vital Signs Last 12 Hrs  T(F): 98.8 (07-14-21 @ 06:31), Max: 98.8 (07-14-21 @ 06:31)  HR: 74 (07-14-21 @ 06:31) (74 - 74)  BP: 122/62 (07-14-21 @ 06:31) (122/62 - 122/62)  BP(mean): --  RR: 17 (07-14-21 @ 06:31) (17 - 17)  SpO2: 96% (07-14-21 @ 06:31) (96% - 96%)  Neck: No palpable thyroid nodules.  CVS: S1S2, No murmurs  Respiratory: No wheezing, no crepitations  GI: Abdomen soft, bowel sounds positive  Musculoskeletal:  edema lower extremities.     Diagnostics          
Patient is a 81y old  Female who presents with a chief complaint of Fall (09 Jul 2021 13:47)      DATE OF SERVICE: 07-09-21 @ 16:00    SUBJECTIVE / OVERNIGHT EVENTS: overnight events noted    ROS:  not available  eating OK per RN        MEDICATIONS  (STANDING):  amLODIPine   Tablet 5 milliGRAM(s) Oral daily  carvedilol 3.125 milliGRAM(s) Oral every 12 hours  dextrose 40% Gel 15 Gram(s) Oral once  dextrose 50% Injectable 25 Gram(s) IV Push once  dextrose 50% Injectable 12.5 Gram(s) IV Push once  dextrose 50% Injectable 25 Gram(s) IV Push once  glucagon  Injectable 1 milliGRAM(s) IntraMuscular once  heparin   Injectable 5000 Unit(s) SubCutaneous every 12 hours    MEDICATIONS  (PRN):        CAPILLARY BLOOD GLUCOSE      POCT Blood Glucose.: 77 mg/dL (09 Jul 2021 13:03)  POCT Blood Glucose.: 84 mg/dL (09 Jul 2021 07:29)  POCT Blood Glucose.: 92 mg/dL (08 Jul 2021 23:16)  POCT Blood Glucose.: 95 mg/dL (08 Jul 2021 17:34)    I&O's Summary      Vital Signs Last 24 Hrs  T(C): 37.1 (09 Jul 2021 11:59), Max: 37.2 (09 Jul 2021 04:54)  T(F): 98.7 (09 Jul 2021 11:59), Max: 98.9 (09 Jul 2021 04:54)  HR: 67 (09 Jul 2021 11:59) (65 - 69)  BP: 155/69 (09 Jul 2021 11:59) (128/75 - 159/72)  BP(mean): --  RR: 18 (09 Jul 2021 11:59) (18 - 19)  SpO2: 100% (09 Jul 2021 11:59) (99% - 100%)    PHYSICAL EXAM:  GENERAL: asthenic  NECK: Supple, No JVD  CHEST/LUNG: clear    HEART: S1 S2; no murmurs   ABDOMEN: Soft, Nontender  EXTREMITIES:  no edema  NEUROLOGY: Alert     LABS:                        10.3   5.00  )-----------( 301      ( 08 Jul 2021 05:38 )             32.4     07-08    138  |  109<H>  |  10  ----------------------------<  101<H>  3.7   |  20<L>  |  1.11    Ca    9.3      08 Jul 2021 05:38  Phos  3.1     07-08  Mg     1.50     07-08                  All consultant(s) notes reviewed and care discussed with other providers        Contact Number, Dr Combs [phone]
Patient is a 81y old  Female who presents with a chief complaint of Fall (12 Jul 2021 12:50)      DATE OF SERVICE: 07-13-21 @ 10:41    SUBJECTIVE / OVERNIGHT EVENTS: overnight events noted    ROS:  Resp: No cough no sputum production  CVS: No chest pain no palpitations no orthopnea  GI: no N/V/D  : no dysuria, no hematuria  Neuro: no weakness no paresthesias  Heme: No petechiae no easy bruising  Msk: No joint pain no swelling  Skin: No rash no itching        MEDICATIONS  (STANDING):  amLODIPine   Tablet 10 milliGRAM(s) Oral daily  atorvastatin 40 milliGRAM(s) Oral at bedtime  carvedilol 3.125 milliGRAM(s) Oral every 12 hours  dextrose 40% Gel 15 Gram(s) Oral once  dextrose 50% Injectable 25 Gram(s) IV Push once  dextrose 50% Injectable 12.5 Gram(s) IV Push once  dextrose 50% Injectable 25 Gram(s) IV Push once  glucagon  Injectable 1 milliGRAM(s) IntraMuscular once  heparin   Injectable 5000 Unit(s) SubCutaneous every 12 hours    MEDICATIONS  (PRN):        CAPILLARY BLOOD GLUCOSE      POCT Blood Glucose.: 80 mg/dL (13 Jul 2021 09:14)  POCT Blood Glucose.: 102 mg/dL (13 Jul 2021 06:25)  POCT Blood Glucose.: 127 mg/dL (12 Jul 2021 23:52)  POCT Blood Glucose.: 77 mg/dL (12 Jul 2021 18:13)  POCT Blood Glucose.: 92 mg/dL (12 Jul 2021 12:30)    I&O's Summary    12 Jul 2021 07:01  -  13 Jul 2021 07:00  --------------------------------------------------------  IN: 900 mL / OUT: 0 mL / NET: 900 mL    13 Jul 2021 07:01  -  13 Jul 2021 10:41  --------------------------------------------------------  IN: 0 mL / OUT: 0 mL / NET: 0 mL        Vital Signs Last 24 Hrs  T(C): 36.4 (13 Jul 2021 06:18), Max: 36.8 (12 Jul 2021 20:38)  T(F): 97.5 (13 Jul 2021 06:18), Max: 98.2 (12 Jul 2021 20:38)  HR: 73 (13 Jul 2021 06:18) (64 - 73)  BP: 120/56 (13 Jul 2021 06:18) (120/56 - 158/61)  BP(mean): --  RR: 17 (13 Jul 2021 06:18) (17 - 18)  SpO2: 98% (13 Jul 2021 06:18) (98% - 100%)    PHYSICAL EXAM:  GENERAL: asthenic  NECK: Supple, No JVD  CHEST/LUNG: clear    HEART: S1 S2; no murmurs   ABDOMEN: Soft, Nontender  EXTREMITIES:  no edema  NEUROLOGY: Alert     LABS:                        11.5   6.03  )-----------( 345      ( 12 Jul 2021 09:58 )             35.6     07-12    133<L>  |  101  |  17  ----------------------------<  89  3.9   |  22  |  1.13    Ca    10.0      12 Jul 2021 09:58                  All consultant(s) notes reviewed and care discussed with other providers        Contact Number, Dr Combs [phone]
Patient is a 81y old  Female who presents with a chief complaint of Fall (13 Jul 2021 16:28)      DATE OF SERVICE: 07-14-21 @ 14:03    SUBJECTIVE / OVERNIGHT EVENTS: overnight events noted    ROS:  not available           MEDICATIONS  (STANDING):  amLODIPine   Tablet 10 milliGRAM(s) Oral daily  atorvastatin 40 milliGRAM(s) Oral at bedtime  carvedilol 3.125 milliGRAM(s) Oral every 12 hours  dextrose 40% Gel 15 Gram(s) Oral once  dextrose 50% Injectable 25 Gram(s) IV Push once  dextrose 50% Injectable 12.5 Gram(s) IV Push once  dextrose 50% Injectable 25 Gram(s) IV Push once  glucagon  Injectable 1 milliGRAM(s) IntraMuscular once  heparin   Injectable 5000 Unit(s) SubCutaneous every 12 hours    MEDICATIONS  (PRN):        CAPILLARY BLOOD GLUCOSE      POCT Blood Glucose.: 84 mg/dL (13 Jul 2021 22:07)  POCT Blood Glucose.: 76 mg/dL (13 Jul 2021 17:53)    I&O's Summary    13 Jul 2021 07:01  -  14 Jul 2021 07:00  --------------------------------------------------------  IN: 200 mL / OUT: 0 mL / NET: 200 mL        Vital Signs Last 24 Hrs  T(C): 37.1 (14 Jul 2021 06:31), Max: 37.4 (13 Jul 2021 22:08)  T(F): 98.8 (14 Jul 2021 06:31), Max: 99.3 (13 Jul 2021 22:08)  HR: 74 (14 Jul 2021 06:31) (64 - 74)  BP: 122/62 (14 Jul 2021 06:31) (122/62 - 144/53)  BP(mean): --  RR: 17 (14 Jul 2021 06:31) (17 - 18)  SpO2: 96% (14 Jul 2021 06:31) (96% - 100%)    PHYSICAL EXAM:  GENERAL: asthenic  NECK: Supple, No JVD  CHEST/LUNG: clear    HEART: S1 S2; no murmurs   ABDOMEN: Soft, Nontender  EXTREMITIES:  no edema  NEUROLOGY: Alert     LABS:                        13.4   5.28  )-----------( 404      ( 14 Jul 2021 07:42 )             42.2     07-14    135  |  100  |  19  ----------------------------<  86  4.3   |  23  |  1.15    Ca    10.8<H>      14 Jul 2021 07:42  Phos  2.7     07-14  Mg     1.80     07-14                  All consultant(s) notes reviewed and care discussed with other providers        Contact Number, Dr Combs [phone]

## 2021-07-15 NOTE — PROGRESS NOTE ADULT - PROBLEM SELECTOR PROBLEM 4
Fall at home

## 2021-07-15 NOTE — PROGRESS NOTE ADULT - PROBLEM SELECTOR PLAN 2
Neurosurgery input noted  MRI not possible secondary to hardware  outpatient follow up with Neurosurgery
Will continue monitoring labs, will FU
outpatient follow up with Neurosurgery  EEG negative epileptiform pattern
await MRI with and without contrast  will continue to follow Neurosurgery and neuro  recommendations
Will continue monitoring labs, will FU
outpatient follow up with Neurosurgery
Neurosurgery input noted  MRI not possible secondary to hardware  outpatient follow up with Neurosurgery  EEG negative epileptiform pattern
Will continue monitoring labs, will FU
Neurosurgery input noted  MRI not possible secondary to hardware  outpatient follow up with Neurosurgery  EEG pending
Will continue monitoring labs, will FU
outpatient follow up with Neurosurgery  EEG negative epileptiform pattern
Will continue monitoring labs, will FU
await MRI  will continue to follow Neurosurgery recommendations
outpatient follow up with Neurosurgery
outpatient follow up with Neurosurgery

## 2021-07-20 NOTE — PHYSICAL THERAPY INITIAL EVALUATION ADULT - PATIENT/FAMILY/SIGNIFICANT OTHER GOALS STATEMENT, PT EVAL
[facility]     Ovarian and Colon 20 years ago    Colon cancer [facility]     age 48    Colon cancer ([facility] [address] ) 2/21/2018    20 years ago    Diabetes mellitus ([facility] [address] )     Disease of thyroid gland     Herpes zoster     last assessed 8/5/15    Hyperlipidemia     Hypertension     Lung nodule 5/9/2019    Ovarian cancer [facility]     age 48    50    TOTAL ABDOMINAL HYSTERECTOMY      age 50     
no goals stated
no goals stated

## 2021-08-27 NOTE — HISTORY OF PRESENT ILLNESS
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 Differential diagnosis includes pituitary mass, meningioma, lymphoma, metastasis. Prolactin was 113 and thought to be due to stalk effect. Patient has been following up with endocrinology for further work up and tests. Per chart review LH and FSH were low. Dexamethasone suppression test showed elevated cortisol. TSH was low however free T4 was normal.\par Patient was unable to have MRI due to metal hardware in her ankle from approximately 30 years ago.

## 2021-08-27 NOTE — ASSESSMENT
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 Hospitalized from 3/13/2021-3/18/2021 with syncope and found to have hypertension and pituitary macroadenoma. CT head followed by CTA showed 3.7 x 2 cm sellar mass extending into suprasellar cistern, cavernous sinuses, and prepontine cistern. Following with endocrinology, has an appointment for visual filed testing. \par \par \par

## 2021-08-30 ENCOUNTER — APPOINTMENT (OUTPATIENT)
Dept: NEUROSURGERY | Facility: CLINIC | Age: 81
End: 2021-08-30

## 2021-11-03 NOTE — ED PROVIDER NOTE - MDM ORDERS SUBMITTED SELECTION
Labs/EKG/Imaging Studies/Medications Constitutional: (-) fever  Eyes/ENT: (-) blurry vision, (-) epistaxis  Cardiovascular: (-) chest pain, (-) syncope  Respiratory: (-) cough, (-) shortness of breath  Gastrointestinal: (-) vomiting, (-) diarrhea  Musculoskeletal: (-) neck pain, (-) back pain, (-) joint pain  Integumentary: (-) rash, (-) edema  Neurological: (-) headache, (-) altered mental status  Psychiatric: (-) hallucinations  Allergic/Immunologic: (-) pruritus

## 2022-01-30 NOTE — ED ADULT NURSE NOTE - NSFALLRSKPASTHIST_ED_ALL_ED
Accidental Asymptomatic Exposure Alert Team Member Letter      1/30/2022    Dear Judy Solorzano    You have indicated that you have not experienced a Covid-19 exposure, and the alert was in error.     You may continue to work, and Employee Health is no longer following you.     If at any time you develop Covid-19 symptoms, notify your leader and remove yourself from work. If you experience an exposure to a Covid-19 positive person, alert Employee Health by completing the [employer] jame, or the Online Exposure Evaluation Tool.     [facility]  Email: [email or web address]  Hotline: [phone]    CC: Manager         no

## 2022-07-25 NOTE — DISCHARGE NOTE PROVIDER - NSDCCPCAREPLAN_GEN_ALL_CORE_FT
Patient called to follow-up to make sure that someone was aware of this message.    PRINCIPAL DISCHARGE DIAGNOSIS  Diagnosis: EMMANUEL (acute kidney injury)  Assessment and Plan of Treatment: Resolved.      SECONDARY DISCHARGE DIAGNOSES  Diagnosis: Abnormal EKG  Assessment and Plan of Treatment: You were seen by Cardiology in-hospital. Stable. Follow up outpatient PCP in 1-2 weeks for echocardiogram to evaluate and further management.    Diagnosis: Hypercalcemia  Assessment and Plan of Treatment: Stable. You were seen by Endocrinology in-hospital. Follow up outpatient PCP and/or Endocrinology for repeat labwork to monitor calcium levels and further management.    Diagnosis: Sellar or suprasellar mass  Assessment and Plan of Treatment: Ultrasound thyroid showed no parathyroid mass. You were unable to get MRI head in-hospital given hardware. Follow up outpatient PCP and Neurosurgery in 1-2 weeks for further management.     PRINCIPAL DISCHARGE DIAGNOSIS  Diagnosis: Acute encephalopathy  Assessment and Plan of Treatment: Your Cat Scan of the head was significant for a sellar suprasellar mass, please follow up as outpatient with Neurosurgery Dr. Chaney for further management in 1-2 weeks      SECONDARY DISCHARGE DIAGNOSES  Diagnosis: Sellar or suprasellar mass  Assessment and Plan of Treatment: You were unable to get MRI head in-hospital given hardware. Follow up outpatient PCP and Neurosurgery Dr. Chaney in 1-2 weeks for further management.    Diagnosis: Abnormal EKG  Assessment and Plan of Treatment: You were seen by Cardiology in-hospital. Stable. Follow up outpatient PCP in 1-2 weeks for echocardiogram to evaluate and further management.    Diagnosis: EMMANUEL (acute kidney injury)  Assessment and Plan of Treatment: Resolved.    Diagnosis: Hypercalcemia  Assessment and Plan of Treatment: Stable. You were seen by Endocrinology in-hospital. Follow up outpatient PCP and/or Endocrinology for repeat labwork to monitor calcium levels and further management. Currently stable

## 2022-09-08 NOTE — DIETITIAN INITIAL EVALUATION ADULT. - PROBLEM/PLAN-6
Patient called back, no nurse available. Patient states she never received a call, even though writer stated two messages were left. Please call her back at [phone].   DISPLAY PLAN FREE TEXT

## 2023-04-19 NOTE — ED ADULT NURSE NOTE - DOES PATIENT HAVE ADVANCE DIRECTIVE
unable to determine
23 y/o male A&OX4, came to the ED with complaints eye trauma after a soccer ball hit him in the face. redness and bruising around left eye. Positive LOC, blurry vision out of left eye. upon arrival to the department patient vomited x 1, as per father at bedside there was blood in his vomit. MD notified of bloody emesis. No other trauma noted, denies CP, SOB.

## 2023-09-27 NOTE — CONSULT NOTE ADULT - PROBLEM SELECTOR RECOMMENDATION 3
Respiratory Therapist Note:         Vest                Brand: Hill-Rom - traditional Hill Rom: Frequencies 8, 9, 10 at pressure 10 then frequencies 18, 19, 20 at pressure 6.                Cough Pause: Cough Pause; Yes                Vest Garment Size: Adult Small                Last Fitting Date: 2022                Frequency of therapy: 14 times per week                Concerns: none         Exercise (purposeful and aerobic for >20 minutes each session): NO.                Does this qualify as additional airway clearance: No         Alternative Airway Clearance: none          Nebulized Medications                Bronchodilators: Albuterol                Mucolytic: Pulmozyme                Antibiotics: none                Additional Inhaled Medications: ICS                Spacer Use: yes         Review Cleaning: Yes. Soap and boil.         Home Care:                Nebulizer Cups (Brand/Type): [employer]                Nebulizer Compressor                            Year Purchased: 2018                             [facility], Phone: [phone], Fax: [phone]                Nebulizer Supply Company:                            Purchased out of pocket from [employer]         Oxygen: none         Pulmonary Rehab none         Plan of Care and Goals for next visit: Keep up the great work. We will connect prior to your next clinic visit to see if you will want to do an in person or virtual appointment. See you this winter!    - TSH is low <0.10 but FT4 and TT3 are low normal thus indicating adequate compensation so does not need thyroid hormone replacement at this time - TSH is low <0.10 likely due to secondary hypothyroidism but FT4 and TT3 are low normal thus indicating adequate compensation so does not need thyroid hormone replacement at this time

## 2023-11-14 NOTE — PROGRESS NOTE ADULT - PROBLEM/PLAN-6
[facility] Marylu Dyson, APRN  11/14/2023                 
DISPLAY PLAN FREE TEXT

## 2024-01-17 NOTE — CONSULT NOTE ADULT - ASSESSMENT
82 y/o F with h/o HTN, deaf and mute BIB EMS after a fall. 82 y/o F with h/o HTN, deaf and mute BIB EMS after a fall with known suprasellar mass, CT read as possible slight increase in size. Patient being admitted    PLAN:   - rec MRI w/wo contrast   - neurology     Case discussed with attending neurosurgeon.    
  ASSESSMENT AND PLAN  81F pmhx of HTN, deafness and muteness from birth, BIBEM for unwitnessed fall. Cardiology consult called for elevated troponin and abnormal EKG.        PLAN  #R/O ACS - EKG shows NSR with TWI in II, III, aVF, V3-6 (new from 3/2021). troponin 106-->90-->76, CKMB 8.8-->8.9, -->392. Less concern for ACS, elevated troponin possibly related to elevated creatinine (1.78).  -continue telemetry  -sent A1c, lipid, TSH  -Assess for chest pain. Serial EKG PRN chest pain to assess for ST changes  -C/w home meds (previously on coreg and amlodipine)  -ECHO in am : to evaluate LV function and wall motion abnormalities  -Low fat DASH diet  -house cardiology will follow, call # [phone] for questions.    Case discussed with Dr. Vanesa Griggs, cardiology fellow  Attending attestation to follow.  
  Assessment  Hypoglycemia: 81y Female with no history of DM patient was not on any hypoglycemic agents likely had hypoglycemia due to poor PO intake, now eating partial meals sugars improving.  Pituitary lesion: Being worked up, has elevated prolactin levels could be due to stalk effect or prolactinoma, MRI brain pending, neurosurgery on board.  Hypercalcemia: Due to primary hyperparathyroidism, calcium improving.              Kam Pyle MD  Cell: [phone]  Office: [phone]            
 80 y/o F with h/o HTN, deaf and mute BIB EMS after a fall.  Per EMS report pt was found on the floor by her son around 8:30pm. Initially presented as a CODE STROKE which was cancelled on agreement with ED attending as patient did not meet [facility] criteria. Neurology consulted for concern of possible seizure. Collateral per chart review as follows: Son reportedly last saw the patient at 8pm when he put her in bed.  Pt is bedbound at baseline.  Reportedly pt had right arm weakness after the fall per EMS. Of note patient had admission for similar presentation of syncope with RUE weakness followed by LUE weakness in March 2021. 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 Patient was unable to get MRI at that time due to potential hardware in ankle. Patient was discharged home with neurosurgery and endocrinology follow up. Neuro exam limited as patient deaf and mute. CTH as below:     CT Head:  1. No CT evidence of acute intracranial hemorrhage, subdural collection, acute territorial infarct, hydrocephalus or calvarial fracture.  2. 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 Lobulated mass is again seen extending into the parasellar regions and along the petroclinoid ligaments bilaterally. Mass is again seen extending along the dorsum of the clivus and into the prepontine space, where it exerts mass effect on the ventral judit.  3. A subtle defect in the floor of the bony sella is better seen on thin cut images from prior CTA on 03/16/2019. There is a 1.8 cm mucous retention cyst versus polyp or mass in the right sphenoid sinus.  4. Contrast-enhanced MRI may be obtained for further evaluation.    Impression   Fall of unclear etiology, mechanical vs syncope vs seizure.     Recommendation  - rEEG  - MRI brain w/wo contrast if possible   - Neurosurgical evaluation  - PT/OT  - No indication for AEDs at this time    Case to be discussed with neurology attending Dr. Knowles 
  ASSESSMENT AND PLAN  81F pmhx of HTN, deafness and muteness from birth, BIBEM for unwitnessed fall. Cardiology consult called for elevated troponin and abnormal EKG.          PLAN  #R/O ACS - EKG shows NSR with TWI in II, III, aVF, V3-6 (new from 3/2021). troponin 106-->90-->76, CKMB 8.8-->8.9, -->392. Less concern for ACS, elevated troponin possibly related to EMMANUEL/elevated creatinine.  -continue telemetry  -sent A1c, lipid, TSH  -Assess for chest pain. Serial EKG PRN chest pain to assess for ST changes  -C/w Coreg and amlodipine  -ECHO in am : to evaluate LV function and wall motion abnormalities  -Low fat DASH diet  -house cardiology will follow, call # [phone] for questions.    Case discussed with Dr. Vanesa Griggs, cardiology fellow  Attending attestation to follow.  
Patient/Caregiver provided printed discharge information.

## 2024-11-25 NOTE — CONSULT NOTE ADULT - CONSULT REASON
"November 26, 2024      Brenda Miranda  [address] [facility] [address]        Dear ,    We are writing to inform you of your test results.    I am writing to let you know the results of the polyp that were removed at the time of your colonoscopy. The polyp returned as an \"adenomatous polyp\". An adenoma is a type of polyp that if left in the colon over a long enough period of time, and under the right circumstances, it could develop into a colon cancer. There was no cancer in your polyp. Your polyp was completely removed, however you are at risk to grow more adenomatous polyps in the future.      Because of this I recommend that you repeat a colonoscopy to screen for new polyps in seven (7) years. Please review these findings and recommendations with your primary care provider. Of course should you develop any symptoms (such as unintended weight loss, blood in your stool or change in bowel pattern), you should let myself or your primary care doctor know as you may need an earlier procedure.      If you have any questions, please don't hesitate to contact the GI care coordinator at [phone].       Sincerely,    Osvaldo Donahue MD  [facility]  Gastroenterology and Hepatology  [facility]                       Resulted Orders   Surgical Pathology Exam   Result Value Ref Range    Case Report       Surgical Pathology Report                         Case: [id number]                                  Authorizing Provider:  Osvaldo Donahue      Collected:           11/25/2024 09:55 AM                                 MD Shayne                                                                     Ordering Location:     [facility]    Received:            11/25/2024 10:42 AM                                 [facility]                                                                     Pathologist:           "
"Roosevelt Florian MD                                                           Specimen:    Large Intestine, Colon, Sigmoid/Rectal, Rectosigmoid polyp                                 Final Diagnosis       RECTOSIGMOID COLON, POLYP, BIOPSY:  Tubular adenoma; negative for high grade dysplasia       Clinical Information       Screening colonoscopy       Gross Description       A(1). Large Intestine, Colon, Sigmoid/Rectal, Rectosigmoid polyp:  The specimen is received in formalin with proper patient identification, labeled \"rectosigmoid polyp\".  The specimen consists of a 0.9 cm pieces pink-tan soft tissue.  The resection margin is inked and it is and submitted in A1.       Microscopic Description       Microscopic examination has been performed.      I have personally reviewed all specimens and or slides, including the listed special stains, and used them with my medical judgement to determine the final diagnosis.       Performing Labs       The technical component of this testing was completed at [facility].    Stain controls for all stains resulted within this report have been reviewed and show appropriate reactivity.       Case Images             "
Pituitary / Clival Mass
Pituitary macroadenoma
syncope

## 2025-06-12 NOTE — DISCHARGE NOTE PROVIDER - NSDCMRMEDTOKEN_GEN_ALL_CORE_FT
[UNKNOWN]
amLODIPine 10 mg oral tablet: 1 tab(s) orally once a day  carvedilol 3.125 mg oral tablet: 1 tab(s) orally every 12 hours